# Patient Record
Sex: MALE | Race: BLACK OR AFRICAN AMERICAN | Employment: UNEMPLOYED | URBAN - METROPOLITAN AREA
[De-identification: names, ages, dates, MRNs, and addresses within clinical notes are randomized per-mention and may not be internally consistent; named-entity substitution may affect disease eponyms.]

---

## 2017-01-11 ENCOUNTER — OFFICE VISIT (OUTPATIENT)
Dept: FAMILY MEDICINE CLINIC | Facility: CLINIC | Age: 47
End: 2017-01-11

## 2017-01-11 VITALS
DIASTOLIC BLOOD PRESSURE: 90 MMHG | SYSTOLIC BLOOD PRESSURE: 140 MMHG | WEIGHT: 161 LBS | RESPIRATION RATE: 14 BRPM | HEART RATE: 88 BPM | BODY MASS INDEX: 25.88 KG/M2 | HEIGHT: 66 IN

## 2017-01-11 DIAGNOSIS — F10.239 ALCOHOL DEPENDENCE WITH WITHDRAWAL WITH COMPLICATION (HCC): ICD-10-CM

## 2017-01-11 DIAGNOSIS — I10 UNCONTROLLED HYPERTENSION: ICD-10-CM

## 2017-01-11 DIAGNOSIS — G51.4 FACIAL TWITCHING: ICD-10-CM

## 2017-01-11 DIAGNOSIS — Z91.19 MEDICALLY NONCOMPLIANT: ICD-10-CM
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
## 2017-01-11 DIAGNOSIS — F32.A DEPRESSIVE DISORDER: ICD-10-CM

## 2017-01-11 DIAGNOSIS — R56.9 SEIZURE (HCC): ICD-10-CM

## 2017-01-11 DIAGNOSIS — G44.311 INTRACTABLE ACUTE POST-TRAUMATIC HEADACHE: ICD-10-CM

## 2017-01-11 DIAGNOSIS — K70.30 ALCOHOLIC CIRRHOSIS OF LIVER WITHOUT ASCITES (HCC): ICD-10-CM

## 2017-01-11 DIAGNOSIS — F10.10 ALCOHOL ABUSE: ICD-10-CM

## 2017-01-11 DIAGNOSIS — Z87.19 HISTORY OF PANCREATITIS: ICD-10-CM

## 2017-01-11 DIAGNOSIS — F41.9 ANXIETY: ICD-10-CM

## 2017-01-11 DIAGNOSIS — I10 ESSENTIAL HYPERTENSION: Primary | ICD-10-CM

## 2017-01-11 DIAGNOSIS — E83.42 HYPOMAGNESEMIA: ICD-10-CM

## 2017-01-11 DIAGNOSIS — D64.9 ANEMIA, UNSPECIFIED TYPE: ICD-10-CM

## 2017-01-11 PROCEDURE — 99215 OFFICE O/P EST HI 40 MIN: CPT | Performed by: FAMILY MEDICINE

## 2017-01-11 RX ORDER — IBUPROFEN 800 MG/1
800 TABLET ORAL 3 TIMES DAILY
Qty: 90 TABLET | Refills: 3 | Status: SHIPPED | OUTPATIENT
Start: 2017-01-11 | End: 2017-11-01

## 2017-01-11 RX ORDER — TRAMADOL HYDROCHLORIDE 50 MG/1
50 TABLET ORAL EVERY 8 HOURS PRN
Qty: 60 TABLET | Refills: 0 | Status: ON HOLD | OUTPATIENT
Start: 2017-01-11 | End: 2017-03-02

## 2017-01-11 RX ORDER — CYCLOBENZAPRINE HCL 10 MG
10 TABLET ORAL NIGHTLY PRN
Qty: 30 TABLET | Refills: 0 | Status: SHIPPED | OUTPATIENT
Start: 2017-01-11 | End: 2017-02-19
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
## 2017-01-16 ENCOUNTER — TELEPHONE (OUTPATIENT)
Dept: FAMILY MEDICINE CLINIC | Facility: CLINIC | Age: 47
End: 2017-01-16

## 2017-01-16 NOTE — TELEPHONE ENCOUNTER
A Medical Record Release of Information Request rec'd from [place] [employer] - [facility] [employer] Disability Determination was rec'd via postal mail on 1/10/2017. Claim # [id number]; Call Adjudicator: Randa Marquis @ [phone] ext [phone].   Request is for pt'

## 2017-01-31 ENCOUNTER — TELEPHONE (OUTPATIENT)
Dept: FAMILY MEDICINE CLINIC | Facility: CLINIC | Age: 47
End: 2017-01-31

## 2017-02-09 NOTE — TELEPHONE ENCOUNTER
Last refill-11/23/2016, quantity-30 refill-1. Scheduled appointment 2/21/2017. Please approve if appropriate.

## 2017-02-10 RX ORDER — DIAZEPAM 5 MG/1
TABLET ORAL
Qty: 30 TABLET | Refills: 0 | Status: SHIPPED | OUTPATIENT
Start: 2017-02-10 | End: 2017-02-10

## 2017-02-10 RX ORDER — DIAZEPAM 5 MG/1
5 TABLET ORAL DAILY
Qty: 30 TABLET | Refills: 0 | Status: ON HOLD | OUTPATIENT
Start: 2017-02-10 | End: 2017-03-02

## 2017-02-23 RX ORDER — CYCLOBENZAPRINE HCL 10 MG
TABLET ORAL
Qty: 30 TABLET | Refills: 0 | Status: ON HOLD | OUTPATIENT
Start: 2017-02-23 | End: 2017-03-02

## 2017-02-23 RX ORDER — ALPRAZOLAM 0.5 MG/1
TABLET ORAL
Qty: 60 TABLET | Refills: 0 | OUTPATIENT
Start: 2017-02-23

## 2017-02-24 NOTE — ED NOTES
Pt for admission for acute pancreatitis, admitted to have alcoholic drinks 2 days ago, started to have pain yesterday.  Known hx of pancreatits alcohol induced

## 2017-02-24 NOTE — ED PROVIDER NOTES
Patient Seen in: [facility] Emergency Department    History   Patient presents with:  Abdomen/Flank Pain (GI/)    Stated Complaint: c/o abd pain since yesterday, more to the upper quads    HPI    Patient is a 68-year-old presented to the ER with h TABLET(750 MG) BY MOUTH TWICE DAILY   ibuprofen 800 MG Oral Tab,  Take 1 tablet (800 mg total) by mouth 3 (three) times daily.    HYDROcodone-acetaminophen (NORCO)  MG Oral Tab,  Take 1 tablet by mouth every 8 (eight) hours as needed for Pain (Left sh tenderness. Extremities: Warm, well perfused, without clubbing, cyanosis, or edema  Calves are symmetric and nontender    Skin: Unremarkable without lesions or rash.      Neurologic: Awake alert and oriented x 3 with clear speech    No tremor, no liver EKG to compare to. Patient seen upon arrival kept n.p.o. IV was established and he was given IV Zofran, Dilaudid, Pepcid, and laboratories are drawn. On repeat exam patient's pain is somewhat improved. Urinalysis unremarkable.   Chemistries consi

## 2017-02-24 NOTE — PLAN OF CARE
NURSING ADMISSION NOTE      Patient admitted via cart  Oriented to room. Safety precautions initiated. Bed in low position. Call light in reach. Patient alert and oriented, /102, hydralazine 10mg given per order, bp reassessment is 143/93.

## 2017-02-24 NOTE — BH LEVEL OF CARE ASSESSMENT
Comprehensive Assessment Note    General Questions  Why are you here?: etoh  Precipitating Events: pt reports he got a little carried away because earlier this week it was his birthday. Pt reports getting tx at [facility] in the past and will follow up there.  Pt

## 2017-02-24 NOTE — PHYSICAL THERAPY NOTE
PHYSICAL THERAPY EVALUATION - INPATIENT     Room Number: 0659/3899-T  Evaluation Date: 2/24/2017  Type of Evaluation: Initial  Physician Order: PT Eval and Treat    Presenting Problem: Alcohol induced pancreatitis  Reason for Therapy: Mobility Dysfunct go. He does not use walker or any DME. He notes he \"does not drive right now\" but states during a recall of his daily activities that he does drive his wife to the train station.      SUBJECTIVE  My foot has been like that my whole life [re RAVEN GARCIA]    [place] Climbing 3-5 steps with a railing?: A Little       AM-PAC Score:  Raw Score: 22   PT Approx Degree of Impairment Score: 20.91%   Standardized Score (AM-PAC Scale): 53.28   CMS Modifier (G-Code): CJ    FUNCTIONAL ABILITY STATUS  Gait Assessment   Gait Kendell above deficits to assist patient in returning to prior level of function. SLS for his age demonstrates high risk for falls and pt would benefit from OP PT upon discharge as well as recommending use of DME, and possible RLE brace to reduce risk for falls.

## 2017-02-25 NOTE — PLAN OF CARE
Patient calm and cooperative. CIWA scores 3 and below all day. Patient c/o abdominal pain, that he states is severe at times, but he has been requesting to eat. Dilaudid controlling pain, and he has been sleeping on and off all day.   IVF infusing

## 2017-02-25 NOTE — PLAN OF CARE
Assumed patient care at 0. Patient A&O x 4 with frequent complaints of pain and discomfort in his abdomen. PRN Dilaudid given with relief noted. VSS. Tele NSR. CIWA protocol and seizure precautions in place. CIWA scores 3 or less overnight.  Patient cont

## 2017-02-25 NOTE — PAYOR COMM NOTE
Attending Physician: Myesha Kaye MD    2/24    ED    Abdomen/Flank Pain     Stated Complaint: c/o abd pain since yesterday, more to the upper quads      Patient is a 61-year-old presented to the ER with history of pancreatitis last year  started having o limits    MAG 1.8  LIPASE 4810   CBC WITH DIFFERENTIAL WITH PLATELET                   EKG    Rate, intervals and axes as noted on EKG Report. Rate: 94  Rhythm: Sinus Rhythm  Reading: Rate, intervals and axes as noted on EKG Report.   No acute ST Elevation

## 2017-02-25 NOTE — PLAN OF CARE
Last TTE only showed only grade 1 diastolic dysfunction. Will continue aggressive IVFs for the acute pancreatitis but will decrease the Lactate Ringers back to 150cc/hr. Dr. Rosa Bradford    3/2016 TTE  Conclusions:    1. Left ventricle:  The cavity size was at t

## 2017-02-25 NOTE — PROGRESS NOTES
[facility]  Internal Medicine Progress Note    Kal Gil Patient Status:  Inpatient    1970 MRN [id number]   [facility] 3NE-A Attending Afshin Brock MD   [address] Day # 1 PCP Yong Power DO     Date of Admission: 17 Pulses: Distal pulses are intact. Psych: Anxious mood and affect. Linear thought process. Memory intact recent and remote. Language normal repeat and naming. POOR judgement and insight.         Lab Results  Component Value Date   WBC 12.5 02/25/2017 seizures.      Dr. Juan Kelly  (Internal Medicine & Psychiatry))

## 2017-02-26 NOTE — PROGRESS NOTES
[facility]  Internal Medicine Progress Note    Chanda Arce Patient Status:  Inpatient    1970 MRN [id number]   [facility] 3NE-A Attending Jesus Lemus MD   [address] Day # 2 PCP Monica Santiago DO     Date of Admission: 17 auscultation. No wheezes or rales. Heart: Tachycardia. Regular rhythm. S1 normal and S2 normal.    Extremities: Normal range of motion. Neurological: Patient is alert and oriented to person, place and time. Skin:  Warm and dry. Abdomen:  [place] Dispo- He agrees to stay in the hospital today. If he leaves today, it will be AMA.      Dr. Delon Adams  (Internal Medicine & Psychiatry))

## 2017-02-26 NOTE — PLAN OF CARE
ANXIETY    • Will report anxiety at manageable levels Progressing        CARDIOVASCULAR - ADULT    • Maintains optimal cardiac output and hemodynamic stability Progressing    • Absence of cardiac arrhythmias or at baseline Progressing        DRUG ABUSE/DET in [address] [place] [address]. Keppra IV per MAR. Seizure precautions maintained no seizure activity.

## 2017-02-26 NOTE — PLAN OF CARE
Patient reported that he did not sleep well last night, and still has a mild headache. Wife at beside this am and states he would fall asleep for about 15 minutes and wake up on and off all night.   This am he was calm, but now patient is becoming more anxi

## 2017-02-26 NOTE — PLAN OF CARE
Patient now c/o of severe abdominal pain, and chronic back, and mild headache pain. He was told to not eat, morphine given.

## 2017-02-26 NOTE — PLAN OF CARE
Patient tolerating clear liquid diet, no n/v. He just c/o heart burn after, and dr. Miri Sepulveda aware.   PRN tums ordered

## 2017-02-26 NOTE — PLAN OF CARE
Patient calm and cooperative. CIWA scores 4 and below all day.  Patient states abdominal pain has resolved today, but is taking pain meds now for headache, that is now very mild. Patient tolerated italian ice, and ice chips and has had no nausea.  He has b

## 2017-02-27 ENCOUNTER — APPOINTMENT (OUTPATIENT)
Dept: CT IMAGING | Facility: HOSPITAL | Age: 47
DRG: 439 | End: 2017-02-27
Attending: Other
Payer: COMMERCIAL

## 2017-02-27 PROCEDURE — 74177 CT ABD & PELVIS W/CONTRAST: CPT

## 2017-02-27 NOTE — PLAN OF CARE
GASTROINTESTINAL - ADULT    • Maintains adequate nutritional intake (undernourished) Not Progressing        PAIN - ADULT    • Verbalizes/displays adequate comfort level or patient's stated pain goal Not Progressing          ANXIETY    • Will report anxiety

## 2017-02-27 NOTE — PLAN OF CARE
ANXIETY    • Will report anxiety at manageable levels Progressing        CARDIOVASCULAR - ADULT    • Maintains optimal cardiac output and hemodynamic stability Progressing    • Absence of cardiac arrhythmias or at baseline Progressing        DRUG ABUSE/DET

## 2017-02-27 NOTE — PLAN OF CARE
INTERNAL MEDICINE PROGRESS NOTE ADDENDUM    CT performed. Consistent with acute pancreatitis; no pseudocyst. Suggested \"focal adynamic ileus\" in duodenum and proximal jejunum.  Also has bilateral pleural effusions (mild/mod in size on my reading, likely f patient's pancreatitis. There is no evidence for small bowel obstruction.    Upper normal gastrohepatic ligament lymph node measures 11 x 9 mm. Few other smaller lymph nodes are also seen in the gastrohepatic ligament. No free air.   ABDOMINAL WALL:  There

## 2017-02-27 NOTE — CM/SW NOTE
SW found pt thru casefinding for readmission risk met with patient for assessment and discharge planning. Pt is a 49 y.o male admitted on 02/24  with dx of ETOH induced pancreatitis. Pt's most recent discharge was 11/15/16.  Pt's medical history includes E

## 2017-02-27 NOTE — PROGRESS NOTES
Pt initially saying he wanted to go and had minimal pain through the night and early morning. Now pt is complaining of abdominal pain and saying he wants to stay and will try to reschedule his interview.      Will re-establish IV access, tried 2x in R h

## 2017-02-27 NOTE — PROGRESS NOTES
[facility]  Internal Medicine Progress Note    Yvonne Barbara Patient Status:  Inpatient    1970 MRN [id number]   [facility] 3NE-A Attending Oscar Rojas MD   [facility] Day # 3 PCP Ginger Mcpherson DO     Date of Admission: 17 104   Temp:    Resp:        Objective:  General Appearance:  Uncomfortable. Vital signs: (most recent): Blood pressure 162/112, pulse 96, temperature 99.2 °F (37.3 °C), temperature source Oral, resp. rate 20, height 66\", weight 170 lb, SpO2 97 %.     Laura Kim drinking, just cut down    3) Seizure disorder vs. Hx of recurrent alcohol withdrawal seizures  - Continue Keppra 750mg po twice a day    4) HTN  - Continue Enalapril 20mg daily  - Continue Hydralazine IV/po prn     5) Elevated AST/ALT, likely alcohol rela

## 2017-02-28 RX ORDER — ALPRAZOLAM 0.5 MG/1
TABLET ORAL
Qty: 60 TABLET | Refills: 0 | OUTPATIENT
Start: 2017-02-28

## 2017-02-28 NOTE — PLAN OF CARE
ANXIETY    • Will report anxiety at manageable levels Progressing        CARDIOVASCULAR - ADULT    • Maintains optimal cardiac output and hemodynamic stability Progressing        DRUG ABUSE/DETOX    • Will have no detox symptoms and will verbalize plan for

## 2017-02-28 NOTE — PROGRESS NOTES
Patient politely declined restorative ambulation citing pain and fatigue. \"I just don't feel good. \"  Encouraged patient to ambulate TID w/ RN staff to avoid deconditioning.   RN Svetlana made aware of patient refusal.

## 2017-02-28 NOTE — PROGRESS NOTES
Gastroenterology Progress Note  Rocco Shahla Patient Status:  Inpatient    1970 MRN [id number]   [facility] 3NE-A Attending Gilmar Stafford MD   [facility] Day # 4 PCP Ellen Cool DO hours as needed for nausea  5. Continue to monitor electrolytes and replace as needed   6. Activity encouraged; IS hourly while awake  7.  EtOH abstinence advised   Renaldo Cranker, APN  12:10 PM  2/28/2017  [facility] Gastroenterology  [phone]    [id number]

## 2017-02-28 NOTE — CONSULTS
Gastroenterology Initial Consultation  I have personally seen and examined the patient.     Patient Name: April Rust  Referring physician: Dr Maine Mcpherson  Reason for consultation: pancreatitis  CC: abdominal pain  HPI: Mr Bro Hernandez is a 42 yo with EtOH'ism, (TUMS) chewable tab 500 mg 500 mg Oral TID PRN   [DISCONTINUED] ChlordiazePOXIDE HCl (LIBRIUM) cap 10 mg 10 mg Oral TID   [DISCONTINUED] HYDROmorphone HCl PF (DILAUDID) 1 MG/ML injection 1 mg 1 mg Intravenous Q3H PRN   Thiamine HCl tab 100 mg 100 mg Oral D [DISCONTINUED] HYDROmorphone HCl PF (DILAUDID) 1 MG/ML injection 1 mg 1 mg Intravenous Q2H PRN   [DISCONTINUED] lactated ringers infusion  Intravenous Continuous     Allergies:   Dilantin                Itching    Comment:irriatibility  Norco [Hydrocodon 170 lb (77.111 kg)  BMI 27.45 kg/m2  SpO2 100%  Gen: AAO x 3, able to speak in complete sentences  HENT: NCAT, oropharynx is clear with moist mucosal membranes  Eyes: Sclerae are anicteric  Neck:  Supple without nuchal rigidity;  No lymphadenopathy  CV: Reg pleural effusions and bibasilar airspace disease. 5. Probable focal adynamic ileus with mild fluid distention of the duodenum and proximal jejunum.  No evidence for mechanical obstruction.        Impression: Mr Sumit Davis is a gentleman with EtOH'ism and EtOH

## 2017-02-28 NOTE — BH PROGRESS NOTE
Went to see the patient and talk to him about cd treatment. He doesn't seem to be too interested. He said, he wants to stop drinking but is not sure about going to a program.  Discussed this with him in length and gave him the cd referrals.   His nurse is

## 2017-02-28 NOTE — PROGRESS NOTES
[facility]  Internal Medicine Progress Note    Lisa Bruno Patient Status:  Inpatient    1970 MRN [id number]   [facility] 3NE-A Attending Daina Gordillo MD   [address] Day # 4 PCP Mary Kate Feliciano DO     Date of Admission: 17 suicidal ideation. He doesn't want to quit drinking. He just wants to cut down.      Past Medical History   Diagnosis Date   • Seizure disorder [facility])    • Unspecified essential hypertension    • ETOH abuse    • Scoliosis    • High blood pressure      • Sert Assessment & Plan  1) Alcohol induced acute pancreatitis  - CT Abd/Pelvis showed findings consistent with acute pancreatitis, no pseudocyst  - Start clears today  - Continue 0.9NS, but at 100cc/hr. - Continue morphine 2-4mg IV every 3hr prn pain.

## 2017-03-01 NOTE — PROGRESS NOTES
Gastroenterology Progress Note  Patient Name: Ivelisse Hooper  Chief Complaint: EtOH pancreatitis  S: Eating ok, had no fevers overnight, no nausea or vomiting.     O: /114 mmHg  Pulse 99  Temp(Src) 98.2 °F (36.8 °C) (Oral)  Resp 17  Ht 167.6 cm (5 did have documented fever 24 hours ago, but in absence of fluid collection or abscess, this likely related to ongoing inflammatory response.  He has ileus on CT, but symptomatically appears to have resolved.     Recommendations:    - Advance to soft diet

## 2017-03-01 NOTE — PROGRESS NOTES
[facility]  Internal Medicine Progress Note    Jake Ford Patient Status:  Inpatient    1970 MRN [id number]   [facility] 3NE-A Attending Allan Hernandez MD   [facility] Day # 5 PCP Rupa Paulino DO     Date of Admission: 17 evening. He has a hx of withdrawal seizures, so is on Keppra. He feels anxious and depressed, but not hopeless. No suicidal ideation. He doesn't want to quit drinking. He just wants to cut down.      Past Medical History   Diagnosis Date   • Seizure disorde 03/01/2017    03/01/2017   CO2 24.0 03/01/2017    03/01/2017   CA 8.1 03/01/2017   MG 1.6 03/01/2017       Assessment & Plan  1) Alcohol induced acute pancreatitis  - CT Abd/Pelvis showed findings consistent with acute pancreatitis, no pseudoc

## 2017-03-01 NOTE — PLAN OF CARE
Internal Medicine Addendum:    1) DC Morphine prn. Start Norco 10/325 1-2 tabs every 4hrs as needed for pain. 2) DC IVF's.     3) Continue soft diet tonight.  If he tolerates, can start regular diet tomorrow AM.     4) Plan for discharge tomorrow at 9 A

## 2017-03-02 NOTE — PLAN OF CARE
ANXIETY    • Will report anxiety at manageable levels Adequate for Discharge        CARDIOVASCULAR - ADULT    • Maintains optimal cardiac output and hemodynamic stability Adequate for Discharge    • Absence of cardiac arrhythmias or at baseline Adequate fo

## 2017-03-02 NOTE — CM/SW NOTE
03/02/17 1400   Discharge disposition   Discharged to: Home or [address] [place] [address] services after discharge Patient refused services   Discharge transportation Private car

## 2017-03-02 NOTE — DISCHARGE SUMMARY
[facility]  Internal Medicine Discharge Summary    Marilin Stoddard Patient Status:  Inpatient    1970 MRN [id number]   [facility] 3NE-A Attending Lakhwinder Doherty MD   [facility] Day # 6 PCP Kendy Carney DO     Date of Admission:  severe alcohol use disorder was admitted on 2/24/17 for evaluation of his nausea and abdominal pain. Hospital Course:   He was found to have acute alcohol related pancreatitis.  He was admitted to the behavioral med floor and started on aggressive IVFs person, place and time.    Skin:  Warm and dry.    Abdomen: Abdomen is soft.  Bowel sounds are normal. There is very mild epigastric tenderness. There is no rebound tenderness.     Pulses: Distal pulses are intact.    Psych: \"Fine\" mood, calm affect.  Mike Dorman 3/2/2017  6:28 AM   Commonly known as:  MOTRIN        Take 1 tablet (800 mg total) by mouth 3 (three) times daily.     Quantity:  90 tablet   Refills:  3       levetiracetam 750 MG Tabs   Last time this was given:  750 mg on 3/2/2017  8:24 AM   Commonly kit

## 2017-03-02 NOTE — PROGRESS NOTES
Paging Dr. Deangelo Gonzalez in regards to PT. B/P.  10mg Hydralazine just given. Clonidine order given. Per Hailey Kong in pharmacy, allergy: contraindication-states it will be okay to give.

## 2017-03-07 NOTE — PAYOR COMM NOTE
Review Type: CONTINUED STAY  Reviewer: Naz Freeman     Date: March 7, 2017 - 12:29 PM  Payor: XAVIER LESLIE  Authorization Number: [id number]  Admit date: 2/24/2017 12:12 AM     Date of Discharge: 3/2/17    acute pancreatitis, elevated transaminases, ileus

## 2017-03-08 NOTE — PAYOR COMM NOTE
Discharge Summaries by Saira Richardson MD at 3/2/2017  8:04 AM      [facility] [address]  Internal Medicine Discharge Summary      Renetta Almanza  Patient Status:  Inpatient    [facility] 2/21/1970  MRN  [id number]    [address] [place] [address] 2/26 he had a fever of 101.4 but no source of infection. He continued to have significant abdominal tenderness, so an Abd/Pelvic CT with IV contrast was obtained. It revealed \"Findings are consistent with acute pancreatitis.  No pancreatic pseudocyst ident AM    Commonly known as:  HYDRODIURIL           Take 1 tablet (25 mg total) by mouth daily.      Quantity:  30 tablet    Refills:  0                           CHANGE how you take these medications          Instructions  Prescription details      HYDROcodon 25 MG Tabs  - HYDROcodone-acetaminophen  MG Tabs          Follow up Visits: With his primary MD.     Follow up Labs: None    Dr. Kristan Gale  (Internal Medicine)

## 2017-03-31 ENCOUNTER — TELEPHONE (OUTPATIENT)
Dept: FAMILY MEDICINE CLINIC | Facility: CLINIC | Age: 47
End: 2017-03-31

## 2017-03-31 NOTE — TELEPHONE ENCOUNTER
Pt Wife Marylee Knudsen called. She is asking for paper prescription of LEVETIRACETAM 750 MG Oral Tab for patient. She explained that they are in between insurance and she found a place that will pay for the medication but will do it only for a paper script.   P

## 2017-03-31 NOTE — TELEPHONE ENCOUNTER
Pt needs printed order because he is between insurances and would like a paper copy of Rx so that he can bring it to a place that will help him pay for it

## 2017-03-31 NOTE — TELEPHONE ENCOUNTER
Call to requested call back # reaches recording stating # is not in service. Call to pt's cell chacho pt-he sts only spouse knows info regarding program she found that will cost much less for this prescription. Requests i contact her.    Call to [facility]

## 2017-04-03 RX ORDER — LEVETIRACETAM 750 MG/1
TABLET ORAL
Qty: 60 TABLET | Refills: 2 | Status: SHIPPED | OUTPATIENT
Start: 2017-04-03 | End: 2017-05-20

## 2017-04-03 NOTE — TELEPHONE ENCOUNTER
Not a protocol medication last OV 1/11/17 last refill 1/27/17, please review and refill if appropriate

## 2017-04-30 ENCOUNTER — PATIENT OUTREACH (OUTPATIENT)
Dept: FAMILY MEDICINE CLINIC | Facility: CLINIC | Age: 47
End: 2017-04-30

## 2017-04-30 NOTE — PROGRESS NOTES
Please call patient to schedule an office visit with Dr. Jazmyn Garcia for hypertention / blood pressure.     Last office visit:    1/11/17

## 2017-05-24 RX ORDER — LEVETIRACETAM 750 MG/1
TABLET ORAL
Qty: 60 TABLET | Refills: 0 | Status: SHIPPED | OUTPATIENT
Start: 2017-05-24 | End: 2017-07-30

## 2017-08-06 RX ORDER — LEVETIRACETAM 750 MG/1
TABLET ORAL
Qty: 60 TABLET | Refills: 0 | Status: SHIPPED | OUTPATIENT
Start: 2017-08-06 | End: 2017-12-27

## 2017-11-01 ENCOUNTER — APPOINTMENT (OUTPATIENT)
Dept: CT IMAGING | Facility: HOSPITAL | Age: 47
DRG: 439 | End: 2017-11-01
Attending: EMERGENCY MEDICINE
Payer: MEDICAID

## 2017-11-01 PROCEDURE — 74177 CT ABD & PELVIS W/CONTRAST: CPT | Performed by: EMERGENCY MEDICINE

## 2017-11-01 NOTE — BH PROGRESS NOTE
Went to see the pt for the gerald level of care assessment. Pt stated he currently is not wanting any cd tx or referrals.   He said, when he is out of the hospital he is planning on looking into treatment at [facility] where he was in the past.  He did talk about ha

## 2017-11-01 NOTE — CONSULTS
[facility]                       Gastroenterology [address] [facility] Gastroenterology    [facility] Patient Status:  Inpatient    1970 MRN [id number]   [facility] 3NE-A Attending Mila Nunez MD   [address] Day # Comment: lengthened right leg  2002: VASECTOMY     Medications:   HYDROmorphone HCl PF (DILAUDID) 1 MG/ML injection 0.5 mg 0.5 mg Intravenous Q30 Min PRN   [COMPLETED] ondansetron HCl (ZOFRAN) injection 4 mg 4 mg Intravenous Once   [COMPLETED] sodium chl other gastrointestinal malignancies; No family history of ulcer disease, or inflammatory bowel disease    ROS:  In addition to the pertinent positives described above:             Infectious Disease:  No chronic infections or recent fevers prior to the acu cyanosis    Skin: Warm and dry    Psychiatric: Anxious     Labs:   Lab Results  Component Value Date   WBC 6.5 11/01/2017   HGB 13.2 11/01/2017   HCT 39.0 11/01/2017   .0 11/01/2017   CREATSERUM 0.95 11/01/2017   BUN 10 11/01/2017    11/01/201 pancreas. This is favored represent pancreatitis. A tiny hypodense lesion within the pancreatic head is present. This measures 1.0 cm in diameter. No evidence of   pancreatic ductal dilatation. SPLEEN:  Small amount of perisplenic fluid is present.   Gary Enrique favored to represent a small pseudocyst.     The findings were discussed with Dr. Kiarra Tierney by Dr. Chitra Martínez at approximately 7:55 on 11/1/17. Read back was performed.      Dictated by: Kristin Flores MD on 11/01/2017 at 7:43       Approved by: Kristin Flores MD  ___________ No mass or hernia. URINARY BLADDER:  Normal.  No visible focal wall thickening, lesion, or calculus. PELVIC NODES:  Normal.  No adenopathy. PELVIC ORGANS:  Normal.  No visible mass. Pelvic organs appropriate for patient age.     BONES:  Normal.  N patient was last seen in February, related to this. He reported resolution of his pain, over the interval period.   However, 2 weeks ago, he was drinking heavily, and reported the onset of mid-abdominal pain transmitting to the left upper abdomen, the next

## 2017-11-01 NOTE — ED PROVIDER NOTES
Patient Seen in: [facility] Emergency Department    History   Patient presents with:  Abdomen/Flank Pain (GI/)  Nausea/Vomiting/Diarrhea (gastrointestinal)    Stated Complaint: Nausea vomiting and abdominal pain for the last 2 weeks    HPI    47-y Physical Exam    Vital signs reviewed  General appearance: Patient is alert and in no acute distress  HEENT: Pupils equal react to light extraocular muscles intact no scleral icterus, mucous membranes are moist, there is no erythema or exudate in t Please view results for these tests on the individual orders. RAINBOW DRAW BLUE   RAINBOW DRAW LAVENDER   RAINBOW DRAW LIGHT GREEN   RAINBOW DRAW GOLD     EKG    Rate, intervals and axes as noted on EKG Report.   Rate: 101  Rhythm: Sinus Rhythm  Readi Course  ------------------------------------------------------------  MDM     As per above      Disposition and Plan     Clinical Impression:  Severe alcohol use disorder ([facility] [address]  (primary encounter diagnosis)  Transaminitis  Alcohol-induced acute pancreatit

## 2017-11-01 NOTE — CONSULTS
[facility]  Report of Consultation    Deysi Sandra Patient Status:  Emergency    1970 MRN [id number]   Location [address] [facility] Attending Prema Nicole MD   Hosp Day # 0 PCP Renée Grayson DO     Reason for Consultation 0.9 % 250 mL IVPB, 40 mEq, Intravenous, Once    Home Medications:      No current facility-administered medications on file prior to encounter.    Current Outpatient Prescriptions on File Prior to Encounter:  LEVETIRACETAM 750 MG Oral Tab TAKE 1 TABLET BY M weeks  TECHNIQUE:  CT scanning was performed from the dome of the diaphragm to the pubic symphysis with non-ionic intravenous contrast material. Post contrast coronal MPR imaging was performed. Dose reduction techniques were used.  Dose information is de lesion or fracture. LUNG BASES:  No visible pulmonary or pleural disease. OTHER:  Negative. CONCLUSION:  There is local inflammatory change surrounding the tip of the appendix with some mild local inflammatory changes present.  This is a new finding Severe alcohol use disorder (HCC)     Anemia     Ileus (HCC)      Impression:    42 y/o with abdominal pain, pancreatitis, appendicitis?    CT scan as noted above: new fluid collection at tip of appendix air within lumen of appendix, though based on exam fi

## 2017-11-01 NOTE — ED INITIAL ASSESSMENT (HPI)
Patient is a 40 yo male with c/o LUQ abd pain intermittently over the last 2 weeks. Patient states that he has HX of pancreatitis and ETOH abuse. Patient states that he is a daily drinker. Patient rates pain as 10 of 10 and described as sharp in nature.  Pa

## 2017-11-01 NOTE — H&P
[facility]  History and Physical     Morris Lesser Patient Status:  Inpatient    1970 MRN [id number]   [facility] 3NE-A Attending Paul Chao MD   Hosp Day # 0 PCP Cindy Gallego DO     Chief Complaint: abdominal pain Outpatient Prescriptions on File Prior to Encounter:  LEVETIRACETAM 750 MG Oral Tab TAKE 1 TABLET BY MOUTH 2 TIMES DAILY Disp: 60 tablet Rfl: 0   hydrochlorothiazide 25 MG Oral Tab Take 1 tablet (25 mg total) by mouth daily.  Disp: 30 tablet Rfl: 0   Enalap results for input(s): TROP, CK in the last 72 hours. Imaging: Imaging data reviewed in Epic. ASSESSMENT / PLAN:     1. Acute pancreatitis   1. NPO  2. IVF  3. GI consult  4. Supportive care  2. ETOH abuse and pending withdrawals  1.  [facility] protocol

## 2017-11-01 NOTE — ED NOTES
Pt states last noc was his last drink, states drinks 5-7 airplane bottles of southern comfort a night

## 2017-11-01 NOTE — CONSULTS
[facility]  Report of Psychiatric Consultation    Marilin Stoddard Patient Status:  Inpatient    1970 MRN [id number]   [facility] 3NE-A Attending Chava Brown MD   Hosp Day # 0 PCP Kendy Carney DO     Date of Admission:  heroin to treat his chronic pain. This \"broke my heart\" and he escaped by drinking more. He feels anxious, depressed, guilty, and has low energy/appetite/motivation and some difficulty sleeping, but NO suicidal ideation.      Psychiatry Review Systems: No 67 years inj 0.5ml, 0.5 mL, Intramuscular, Prior to discharge  •  levETIRAcetam (KEPPRA) 750 mg in sodium chloride 0.9 % 100 mL IVPB, 750 mg, Intravenous, Q12H  •  Piperacillin Sod-Tazobactam So (ZOSYN) 3.375 g in dextrose 5 % 100 mL ADD-vantage, 3.375 g, U.S.    Insight: poor  Judgment: poor    Laboratory Data:    Lab Results  Component Value Date   WBC 6.5 11/01/2017   HGB 13.2 11/01/2017   HCT 39.0 11/01/2017   .0 11/01/2017   CREATSERUM 0.95 11/01/2017   BUN 10 11/01/2017    11/01/2017   K

## 2017-11-01 NOTE — PLAN OF CARE
GASTROINTESTINAL - ADULT    • Minimal or absence of nausea and vomiting Progressing    • Maintains or returns to baseline bowel function Progressing        NEUROLOGICAL - ADULT    • Absence of seizures Progressing            GI consult  Surgery consult  IV

## 2017-11-02 NOTE — PLAN OF CARE
Pt alert and oriented. C/o abdominal pain, itching. PRN morphine given, relief noted. IV benadryl x1 given, relief noted. Pt adamant about getting IV morphine q2 hours, rating pain 4/10. No c/o n/v. CIWA q2 hours.   Pt refusing tx programs for his dri

## 2017-11-02 NOTE — PROGRESS NOTES
[facility]  Progress Note    Clari Drake Patient Status:  Inpatient    1970 MRN [id number]   [facility] 3NE-A Attending Jennifer Esteban MD   Hosp Day # 1 PCP Az Code, DO     Subjective:    Patient reports pain is impro pancreatitis without infection or necrosis     Alcohol-induced acute pancreatitis     Alcohol-induced acute pancreatitis, unspecified complication status     Benign essential HTN     Transaminitis     Alcoholic hepatitis without ascites     Severe alcohol

## 2017-11-02 NOTE — PROGRESS NOTES
[facility]  Report of Psychiatric Progress Note    Date of Admission: 11/1/17  Date of Service: 11/2/17  Reason for Consultation: Alcohol use disorder     Impression: He has alcohol induced acute pancreatitis.  He has severe alcohol use disorder and sa for acute pancreatitis. But it was in the summer of 2017 that he began drinking 1 pint of vodka again. Then the past 2 wks, he had days when he drank 1/5 gallon of vodka daily.  He says the trigger for the binge drinking was his son coming come from the [place] Surgical History:  1978: OTHER SURGICAL HISTORY      Comment: lengthened right leg  2002: VASECTOMY        Family History   Problem Relation Age of Onset   • Heart Attack Father 50   • Thyroid Disorder Mother        Allergies:     Dilantin                I ALB 2.8 11/02/2017   ALKPHO 109 11/02/2017   BILT 1.0 11/02/2017   TP 7.2 11/02/2017   AST 42 11/02/2017   ALT 48 11/02/2017   PTT 29.5 11/01/2017   INR 1.12 11/01/2017    11/02/2017   MG 1.9 11/02/2017       STUDIES:     11/1/17  CT Abd/Pelvis wi

## 2017-11-02 NOTE — PAYOR COMM NOTE
--------------  ADMISSION REVIEW     Payor: MEDICAID  Subscriber #:  [id number]  Authorization Number: N/A    Admit date: 11/1/17  Admit time: 1012       Admitting Physician: Felisha Ferguson MD  Attending Physician:  Ashanti Olvera MD  [facility][[id number]]          Abnormal            Final result                 Please view results for these tests on the individual orders. RAINBOW DRAW BLUE   RAINBOW DRAW LAVENDER   RAINBOW DRAW LIGHT GREEN   RAINBOW DRAW GOLD[SY. 2]     EKG    Rate Action Dose Route User    11/2/2017 0230 Given 5 mg Intravenous Mechele Vin, RN      hydrALAzine HCl (APRESOLINE) injection 10 mg     Date Action Dose Route User    11/2/2017 0515 Given 10 mg Intravenous Mechele Vin, RN      HYDROmorphone HCl PF (DIL injection 2 mg     Date Action Dose Route User    11/2/2017 0650 Given 2 mg Intravenous Michael Orourke RN      INFUVITE (INFUVITE) 10 mL, Thiamine HCl 634 mg, folic acid (FOLVITE) 1 mg in dextrose 5 % 1,000 mL infusion     Date Action Dose Route User    1

## 2017-11-02 NOTE — PLAN OF CARE
Assumed patient care at 1900  Patient A&O x 4  Complaints of pain and discomfort in abdomen.  PRN dilaudid given with relief noted  Tele-NSR/ST  CIWA protocol  Sz precautions in place  NPO with ice chips  Patient resting comfortably in bed  Updated on POC

## 2017-11-02 NOTE — PROGRESS NOTES
[facility]  Progress Note     Connor Montes De Oca Patient Status:  Inpatient    1970 MRN [id number]   [facility] 3NE-A Attending Theresa Ramírez MD   Hosp Day # 1 PCP Gloria Lamb DO     Chief Complaint: abdominal pain    S: Pa piperacillin-tazobactam  3.375 g Intravenous Q8H   • multivitamin/thiamine/folic acid infusion   Intravenous Q24H       ASSESSMENT / PLAN:     1. Acute pancreatitis   1. NPO  2. IVF  3. GI  On consult  4. Supportive care  2.  ETOH abuse and pending withdraw

## 2017-11-02 NOTE — PROGRESS NOTES
Gastroenterology Progress Note  Patient Name: Marilin Stoddard  Chief Complaint: Acute pancreatitis  S: The patient reports continued abdominal pain localized to the epigastrium. He has had some nausea, but no vomiting.   He is requesting narcotic analge chips              IV LR             Supportive care for now

## 2017-11-03 NOTE — PLAN OF CARE
GASTROINTESTINAL - ADULT    • Minimal or absence of nausea and vomiting Progressing    • Maintains or returns to baseline bowel function Progressing        NEUROLOGICAL - ADULT    • Absence of seizures Progressing          Patient alert and oriented x4.   O

## 2017-11-03 NOTE — PROGRESS NOTES
[facility]  Progress Note     Ori Flanagan Patient Status:  Inpatient    1970 MRN [id number]   [facility] 3NE-A Attending Anabelle Mills MD   Hosp Day # 2 PCP Dinorah Nickerson DO     Chief Complaint: abdominal pain    S: pa Epic.    Medications:   • Enalapril Maleate  20 mg Oral Daily   • hydrochlorothiazide  25 mg Oral Daily   • metoprolol Tartrate  10 mg Intravenous Q6H   • levETIRAcetam  750 mg Intravenous Q12H   • piperacillin-tazobactam  3.375 g Intravenous Q8H   • multi

## 2017-11-03 NOTE — PROGRESS NOTES
Gastroenterology Progress Note  Patient Name: Jake Ford  Chief Complaint: Acute pancreatitis  S: The patient reports that his abdominal pain is much better and he is hungry. No narcotic analgesia since 20:30 last night.    O: BP (!) 154/103 (BP Lo

## 2017-11-03 NOTE — PROGRESS NOTES
[facility]  Report of Psychiatric Progress Note    Date of Admission: 11/1/17  Date of Service: 11/3/17  Reason for Consultation: Alcohol use disorder     Impression: He has alcohol induced acute pancreatitis.  He has severe alcohol use disorder and sa of 2017 that he began drinking 1 pint of vodka again. Then the past 2 wks, he had days when he drank 1/5 gallon of vodka daily. He says the trigger for the binge drinking was his son coming come from [place] [employer] and abusing heroin to treat his chronic pain.  Addi Covarrubias • High blood pressure     • Scoliosis     • Seizure disorder [facility])     • Unspecified essential hypertension        Past Surgical History:  1978: OTHER SURGICAL HISTORY      Comment: lengthened right leg  2002: VASECTOMY        Family History   Problem Re 11/03/2017   K 3.6 11/03/2017   CL 95 11/03/2017   CO2 29.0 11/03/2017    11/03/2017   CA 8.6 11/03/2017   ALB 2.5 11/03/2017   ALKPHO 92 11/03/2017   BILT 0.7 11/03/2017   TP 6.6 11/03/2017   AST 24 11/03/2017   ALT 33 11/03/2017       STUDIES:

## 2017-11-03 NOTE — PROGRESS NOTES
3218   Paged Dr. Luis Daniel Louise in regards to B/P  Chanda Louise in regards to B/P-MD made aware of B/P of 175/116 and that the Metoprolol was given at 2300-------do not give more medication currently for B/P. She is ordering Tylenol for his headache.

## 2017-11-03 NOTE — PLAN OF CARE
Not all care plan progression is crossing over on all goals. Pt. Is complaining of abd pain, medication given per MAR. MD notified about B/P-medication dosage changed-monitoring B/P. Tylenol order received for headache. K being replaced via IVPB. Ax4.

## 2017-11-03 NOTE — CONSULTS
[facility] Pharmacy Note: Route Optimization for IV \"Banana Bag\" thiamin, vitamins, folic acid     Patient is currently on IV vitamins in a banana bag  given every 24 hours.    The patient meets the criteria to convert to the oral equivalent as established by th

## 2017-11-04 NOTE — PROGRESS NOTES
Gastroenterology Progress Note  Patient Name: Parvin Holguin  Chief Complaint: Acute pancreatitis  S: The patient reports, \"I feel great\". No nausea/vomiting. No abdominal pain. He is tolerating a low residue diet.    O: BP (!) 156/106 (BP Locat

## 2017-11-04 NOTE — PLAN OF CARE
Not all care plan progressions are crossing over. Pt. B/P elevated-Pt. Refusing all further B/P medications, However, later in evening, pt did allow PRN hydralazine to be given. All other VSS, will continue to monitor. Pt.  Was upset about pain medicatio

## 2017-11-04 NOTE — CM/SW NOTE
Spoke with [place] from [place]. Pt is straight Medicaid. There is no reason to transfer out at this time.

## 2017-11-04 NOTE — PROGRESS NOTES
2031   Paged Dr. Mónica Muñiz about pt. Refusing any further medication for his B/P, pt. States, \"I am not taking any more B/P medication, period. \"  Pt. Is also not happy about his pain medication time frame. 2040  Dr. Mónica Muñiz ordered Toradol.   Explained

## 2017-11-05 NOTE — DISCHARGE SUMMARY
[facility]  DISCHARGE SUMMARY     Bruno Ferguson Patient Status:  Inpatient    1970 MRN [id number]   [facility] 3NE-A Attending No att. providers found   Hosp Day # 3 PCP Belinda Manjarrez DO     Date of Admission: 2017  D chills for the past 3 days but no fever.       Brief Synopsis: Patient is a 53-year-old man admitted with acute abdominal pain. He is found to have acute pancreatitis.   He was also found to have some inflammation near his appendix and a possible abscess o Tabs  Commonly known as:  ZOLOFT  What changed:  See the new instructions. Take 3 tablets (150 mg total) by mouth daily.    Quantity:  45 tablet  Refills:  1        CONTINUE taking these medications      Instructions Prescription details   Enalapril Ma

## 2017-11-15 DIAGNOSIS — I10 UNCONTROLLED HYPERTENSION: ICD-10-CM

## 2017-11-15 RX ORDER — ENALAPRIL MALEATE 20 MG/1
TABLET ORAL
Qty: 30 TABLET | Refills: 0 | Status: SHIPPED | OUTPATIENT
Start: 2017-11-15 | End: 2017-12-27

## 2017-11-17 RX ORDER — TRAMADOL HYDROCHLORIDE 50 MG/1
TABLET ORAL
Qty: 60 TABLET | Refills: 0 | OUTPATIENT
Start: 2017-11-17

## 2017-11-17 NOTE — TELEPHONE ENCOUNTER
Call from [facility]-requesting status of refill request.   Advised record shows refill request received after ofc hours 11/15/17, and of ofc 2-3 business day refill protocol. Informed refill request forwarded to  for review. Abundio Gallagher voices understanding/no further questions. Last ofc visit 1/11/17-advised to follow up in one month-no appts since. Has appt 11/2717  Med last ordered 11/4/17 #20 no RF  **see med refill order pended for review** thanks!

## 2017-11-20 NOTE — TELEPHONE ENCOUNTER
SERTRALINE 50MG TABLETS    Not a per protocol medication. Rx is pending for your approval.    Please sign,    Thank you    Yamini Mary Lou Brandon has an upcoming appt on 11*27*17.

## 2017-11-21 RX ORDER — TRAMADOL HYDROCHLORIDE 50 MG/1
TABLET ORAL
Qty: 60 TABLET | Refills: 0 | OUTPATIENT
Start: 2017-11-21

## 2017-11-21 NOTE — TELEPHONE ENCOUNTER
TRAMADOL 50MG TABLETS---last refilled on 11*04*17 by Dr. Hay Adams for 20 tabs and 0 refill. Not a per protocol medication.     Rx is pending for your approval.    Please print & sign,    Thank you

## 2017-11-27 ENCOUNTER — TELEPHONE (OUTPATIENT)
Dept: FAMILY MEDICINE CLINIC | Facility: CLINIC | Age: 47
End: 2017-11-27

## 2017-11-27 NOTE — TELEPHONE ENCOUNTER
Patient No Show/Call appointment on 11/27/17. This is patient's second No Show appointment for the year: 2/21/17, 11/27/17. Account will be charged $50.  No Show Charge letter sent to patient home address: [address] [place] [address]

## 2017-12-13 ENCOUNTER — HOSPITAL ENCOUNTER (INPATIENT)
Facility: HOSPITAL | Age: 47
LOS: 2 days | Discharge: HOME | DRG: 101 | End: 2017-12-16
Attending: EMERGENCY MEDICINE | Admitting: HOSPITALIST
Payer: MEDICAID

## 2017-12-13 DIAGNOSIS — E11.9 TYPE 2 DIABETES MELLITUS WITHOUT COMPLICATION, WITH LONG-TERM CURRENT USE OF INSULIN (HCC): Primary | ICD-10-CM

## 2017-12-13 DIAGNOSIS — G40.909 SEIZURE DISORDER (HCC): ICD-10-CM

## 2017-12-13 DIAGNOSIS — R73.9 HYPERGLYCEMIA: ICD-10-CM

## 2017-12-13 DIAGNOSIS — E87.6 HYPOKALEMIA: ICD-10-CM

## 2017-12-13 DIAGNOSIS — F10.10 ALCOHOL ABUSE: ICD-10-CM

## 2017-12-13 DIAGNOSIS — Z79.4 TYPE 2 DIABETES MELLITUS WITHOUT COMPLICATION, WITH LONG-TERM CURRENT USE OF INSULIN (HCC): Primary | ICD-10-CM

## 2017-12-13 PROBLEM — E87.1 HYPONATREMIA: Status: ACTIVE | Noted: 2017-12-13

## 2017-12-13 PROCEDURE — 99223 1ST HOSP IP/OBS HIGH 75: CPT | Performed by: HOSPITALIST

## 2017-12-13 RX ORDER — INSULIN ASPART 100 [IU]/ML
0.2 INJECTION, SOLUTION INTRAVENOUS; SUBCUTANEOUS ONCE
Status: COMPLETED | OUTPATIENT
Start: 2017-12-13 | End: 2017-12-13

## 2017-12-14 ENCOUNTER — APPOINTMENT (OUTPATIENT)
Dept: GENERAL RADIOLOGY | Facility: HOSPITAL | Age: 47
DRG: 101 | End: 2017-12-14
Attending: HOSPITALIST
Payer: MEDICAID

## 2017-12-14 PROBLEM — G40.909 SEIZURE DISORDER (HCC): Status: ACTIVE | Noted: 2017-12-14

## 2017-12-14 PROBLEM — E11.9 TYPE 2 DIABETES MELLITUS WITHOUT COMPLICATION, WITH LONG-TERM CURRENT USE OF INSULIN (HCC): Status: ACTIVE | Noted: 2017-12-14

## 2017-12-14 PROBLEM — Z79.4 TYPE 2 DIABETES MELLITUS WITHOUT COMPLICATION, WITH LONG-TERM CURRENT USE OF INSULIN (HCC): Status: ACTIVE | Noted: 2017-12-14

## 2017-12-14 PROBLEM — E87.6 HYPOKALEMIA: Status: ACTIVE | Noted: 2017-12-14

## 2017-12-14 PROCEDURE — 99233 SBSQ HOSP IP/OBS HIGH 50: CPT | Performed by: STUDENT IN AN ORGANIZED HEALTH CARE EDUCATION/TRAINING PROGRAM

## 2017-12-14 PROCEDURE — 99232 SBSQ HOSP IP/OBS MODERATE 35: CPT | Performed by: CLINICAL NURSE SPECIALIST

## 2017-12-14 PROCEDURE — 99255 IP/OBS CONSLTJ NEW/EST HI 80: CPT | Performed by: OTHER

## 2017-12-14 PROCEDURE — 73140 X-RAY EXAM OF FINGER(S): CPT | Performed by: HOSPITALIST

## 2017-12-14 RX ORDER — ONDANSETRON 2 MG/ML
4 INJECTION INTRAMUSCULAR; INTRAVENOUS EVERY 4 HOURS PRN
Status: DISCONTINUED | OUTPATIENT
Start: 2017-12-14 | End: 2017-12-14

## 2017-12-14 RX ORDER — BISACODYL 10 MG
10 SUPPOSITORY, RECTAL RECTAL
Status: DISCONTINUED | OUTPATIENT
Start: 2017-12-14 | End: 2017-12-16

## 2017-12-14 RX ORDER — DEXTROSE AND SODIUM CHLORIDE 5; .45 G/100ML; G/100ML
INJECTION, SOLUTION INTRAVENOUS CONTINUOUS
Status: DISCONTINUED | OUTPATIENT
Start: 2017-12-15 | End: 2017-12-14

## 2017-12-14 RX ORDER — SODIUM CHLORIDE 9 MG/ML
INJECTION, SOLUTION INTRAVENOUS CONTINUOUS
Status: DISCONTINUED | OUTPATIENT
Start: 2017-12-14 | End: 2017-12-14

## 2017-12-14 RX ORDER — LORAZEPAM 2 MG/ML
2 INJECTION INTRAMUSCULAR
Status: DISCONTINUED | OUTPATIENT
Start: 2017-12-14 | End: 2017-12-14

## 2017-12-14 RX ORDER — LORAZEPAM 2 MG/ML
2 INJECTION INTRAMUSCULAR EVERY 30 MIN PRN
Status: DISCONTINUED | OUTPATIENT
Start: 2017-12-14 | End: 2017-12-15

## 2017-12-14 RX ORDER — LORAZEPAM 2 MG/ML
3 INJECTION INTRAMUSCULAR
Status: DISCONTINUED | OUTPATIENT
Start: 2017-12-14 | End: 2017-12-14

## 2017-12-14 RX ORDER — BLOOD-GLUCOSE METER
EACH MISCELLANEOUS
Qty: 1 KIT | Refills: 0 | Status: SHIPPED | OUTPATIENT
Start: 2017-12-14 | End: 2017-12-16

## 2017-12-14 RX ORDER — ENALAPRIL MALEATE 10 MG/1
20 TABLET ORAL DAILY
Status: DISCONTINUED | OUTPATIENT
Start: 2017-12-14 | End: 2017-12-16

## 2017-12-14 RX ORDER — ENOXAPARIN SODIUM 100 MG/ML
40 INJECTION SUBCUTANEOUS DAILY
Status: DISCONTINUED | OUTPATIENT
Start: 2017-12-14 | End: 2017-12-16

## 2017-12-14 RX ORDER — MAGNESIUM OXIDE 400 MG (241.3 MG MAGNESIUM) TABLET
800 TABLET ONCE
Status: COMPLETED | OUTPATIENT
Start: 2017-12-14 | End: 2017-12-14

## 2017-12-14 RX ORDER — LORAZEPAM 2 MG/ML
3 INJECTION INTRAMUSCULAR EVERY 30 MIN PRN
Status: DISCONTINUED | OUTPATIENT
Start: 2017-12-14 | End: 2017-12-15

## 2017-12-14 RX ORDER — DEXTROSE MONOHYDRATE 25 G/50ML
50 INJECTION, SOLUTION INTRAVENOUS
Status: DISCONTINUED | OUTPATIENT
Start: 2017-12-14 | End: 2017-12-16

## 2017-12-14 RX ORDER — LORAZEPAM 2 MG/ML
1 INJECTION INTRAMUSCULAR EVERY 30 MIN PRN
Status: DISCONTINUED | OUTPATIENT
Start: 2017-12-14 | End: 2017-12-14

## 2017-12-14 RX ORDER — IBUPROFEN 400 MG/1
400 TABLET ORAL ONCE
Status: COMPLETED | OUTPATIENT
Start: 2017-12-14 | End: 2017-12-14

## 2017-12-14 RX ORDER — SODIUM CHLORIDE, SODIUM LACTATE, POTASSIUM CHLORIDE, CALCIUM CHLORIDE 600; 310; 30; 20 MG/100ML; MG/100ML; MG/100ML; MG/100ML
INJECTION, SOLUTION INTRAVENOUS CONTINUOUS
Status: DISCONTINUED | OUTPATIENT
Start: 2017-12-14 | End: 2017-12-14

## 2017-12-14 RX ORDER — LORAZEPAM 2 MG/ML
4 INJECTION INTRAMUSCULAR
Status: DISCONTINUED | OUTPATIENT
Start: 2017-12-14 | End: 2017-12-15

## 2017-12-14 RX ORDER — SODIUM CHLORIDE 9 MG/ML
INJECTION, SOLUTION INTRAVENOUS CONTINUOUS
Status: DISCONTINUED | OUTPATIENT
Start: 2017-12-14 | End: 2017-12-15

## 2017-12-14 RX ORDER — POTASSIUM CHLORIDE 20 MEQ/1
40 TABLET, EXTENDED RELEASE ORAL EVERY 4 HOURS
Status: COMPLETED | OUTPATIENT
Start: 2017-12-14 | End: 2017-12-14

## 2017-12-14 RX ORDER — DEXMEDETOMIDINE HYDROCHLORIDE 4 UG/ML
INJECTION, SOLUTION INTRAVENOUS CONTINUOUS PRN
Status: DISCONTINUED | OUTPATIENT
Start: 2017-12-14 | End: 2017-12-15

## 2017-12-14 RX ORDER — ONDANSETRON 2 MG/ML
4 INJECTION INTRAMUSCULAR; INTRAVENOUS EVERY 6 HOURS PRN
Status: DISCONTINUED | OUTPATIENT
Start: 2017-12-14 | End: 2017-12-16

## 2017-12-14 RX ORDER — LORAZEPAM 2 MG/ML
4 INJECTION INTRAMUSCULAR EVERY 10 MIN PRN
Status: DISCONTINUED | OUTPATIENT
Start: 2017-12-14 | End: 2017-12-14

## 2017-12-14 RX ORDER — IBUPROFEN 400 MG/1
400 TABLET ORAL EVERY 6 HOURS PRN
Status: DISCONTINUED | OUTPATIENT
Start: 2017-12-14 | End: 2017-12-16

## 2017-12-14 RX ORDER — POTASSIUM CHLORIDE 20 MEQ/1
40 TABLET, EXTENDED RELEASE ORAL ONCE
Status: COMPLETED | OUTPATIENT
Start: 2017-12-14 | End: 2017-12-14

## 2017-12-14 RX ORDER — LORAZEPAM 2 MG/ML
1 INJECTION INTRAMUSCULAR
Status: DISCONTINUED | OUTPATIENT
Start: 2017-12-14 | End: 2017-12-15

## 2017-12-14 NOTE — CONSULTS
[facility]  Diabetes Consult Note    Levell Wyalusing Patient Status:  Inpatient    1970 MRN [id number]   [facility] 4SW-A Attending Christopher Mcacrthy MD   Hosp Day # 0 PCP Rosario Rebolledo DO     Reason for Consult:     New Onset Agnes Schroeder Itching    Comment:irriatibility  Norco [Hydrocodone-*    Itching    Comment:Itching, dizziness    Medications: Complete list reviewed. Active diabetes medications include IV insulin as inpatient.     Labs:    Recent Labs   Lab  12/13/17   4271  12/14/17 his wife stated she would attend with him. Discussed appointment times at the [facility].   See AVS.    Due to patient's drowsiness agreed to return tomorrow to begin teaching the glucometer and explained nursing will begin to teach how to admi injection    PRESCRIPTION RECOMMENDATIONS:    · Traditional [place] Medicaid:  · Insulin: Levemir Flextouch and Humalog Kwikpen  · Supplies:  BD pen needles (Katrin); BD syringes  · Glucometer:  One Touch Ultra ([employer])    PROVIDER F/U RECOMMENDATIONS:

## 2017-12-14 NOTE — BH PROGRESS NOTE
Patient states he needs to stop drinking but knows he has to do this on his own. He does not want any referrals because he said he has them. Pt was seen last month and was planning on going to a php etoh program at [facility]. Pts nurse is aware.

## 2017-12-14 NOTE — PROGRESS NOTES
[facility] Pharmacy Note: Dose Adjustment for Unasyn (ampicillin/sulbactam)    Bruno Ferguson is a 41year old male who has been prescribed Unasyn (ampicillin/sulbactam) 1500 mg every 6 hrs.   CrCl is estimated creatinine clearance is 84.2 mL/min (based on SCr

## 2017-12-14 NOTE — ED PROVIDER NOTES
Patient Seen in: [facility] Emergency Department    History   Patient presents with:  Seizure Disorder (neurologic)  Alcohol Intoxication (neurologic)    Stated Complaint: SEIZURES     HPI    80-year-old male presents to the emergency department wit Smokeless tobacco: Never Used                      Alcohol use: Yes           3.6 oz/week     Shots of liquor: 6 per week     Comment: was told to substain in ER      Review of Systems    Positive for stated complaint: SEIZURES   Other systems PANEL (14) - Abnormal; Notable for the following:        Result Value    Glucose 478 (*)     BUN 4 (*)     Alkaline Phosphatase 133 (*)      (*)     Alt 77 (*)     Total Protein 8.8 (*)     Sodium 132 (*)     Potassium 3.0 (*)     Chloride 92 (*) Hyperglycemia R73.9 11/9/2016 Unknown    Hyponatremia E87.1 12/13/2017 Yes

## 2017-12-14 NOTE — PROGRESS NOTES
ICU  Critical Care APN Progress Note    NAME: Lyle Chaparro - ROOM: [address] [place] [address] - MRN: [id number] - Age: 41year old - BVR:9/40/7210    History Of Present Illness:  Lyle Chaparro is a 41year old male with PMHx significant for HTN, Seizure disorder, BMI 25.02 kg/m²   Physical Exam:    General Appearance: Alert, cooperative, tearful at times, appears stated age  Neck: No JVD, neck supple, no adenopathy, trachea midline, no carotid bruits  Lungs: Clear to auscultation bilaterally, respirations unlabored with intensivist on-call, Dr Mary Vásquez, [address] [phone]  Pgr: [phone]

## 2017-12-14 NOTE — PLAN OF CARE
Received pt from ED shortly after midnight. A/O x4. Seizure precautions, no seizure activity. CIWA's 0-10, 2mg ativan given.    When asked if he wants to quit drinking, pt states he \"doesn't want to drink, but he has to drink\" and was tearful when sayin

## 2017-12-14 NOTE — PAYOR COMM NOTE
--------------  ADMISSION REVIEW     Payor: MEDICAID            Patient Seen in: [facility] Emergency Department    History[DW.1]   Patient presents with:  Seizure Disorder   Alcohol Intoxication (neurologic)[DW.2]    Stated Complaint: SEIZURES     HP VASECTOMY        Smoking status: Never Smoker                                                              Smokeless tobacco: Never Used                      Alcohol use: Yes           3.6 oz/week     Shots of liquor: 6 per week     Comment: was told to harris the extremities without deformities. No tenderness. Neurologically intact. [DW.3]         ED Course[DW.1]     Labs Reviewed   COMP METABOLIC PANEL (14) - Abnormal; Notable for the following:        Result Value    Glucose 478 (*)     BUN 4 (*)     Alkalin pm[DW. 2]    Follow-up:[DW.1]  No follow-up provider specified.[DW.2]      Medications Prescribed:[DW.1]  Current Discharge Medication List        Present on Admission  Date Reviewed: 2/24/2017          ICD-10-CM Noted POA    Hyperglycemia R73.9 11/9/2016 U History:   Diagnosis Date   • Back problem    • Depression    • ETOH abuse    • High blood pressure    • Scoliosis    • Seizure disorder (HCC)    • Unspecified essential hypertension         Past Surgical History: Past Surgical History:  1978: OTHER SURGIC Review of Systems:   A comprehensive 14 point review of systems was completed. Pertinent positives and negatives noted in the HPI.     Physical Exam:    /100   Pulse 86   Temp 98 °F (36.7 °C) (Temporal)   Resp 16   Ht 167.6 cm (5' 6\")   Wt 1 Will likely need insulin on discharge. 3. ETOH abuse:  Monitor for withdrawal, CIWA protocol, banana bag  4. Pseudohyponatremia  5. Hypokalemia:  Replenish per electrolyte protocol  6. ETOH Hepatitis:  No abdominal tenderness on deep palapation  7.  HTN: Route User    12/14/2017 0921 Given 6 Units Subcutaneous (Left Upper Arm) Davidson Cruz RN      lactated ringers infusion     Date Action Dose Route User    12/14/2017 0927 New Bag (none) Intravenous Davidson Cruz RN      levETIRAcetam (KEPPRA) 500 5787 Given 150 mg Oral Debora Alonzo RN          REVIEWER COMMENTS:     OTHER:

## 2017-12-14 NOTE — H&P
[facility]  History and Physical     Joe García Patient Status:  Emergency    1970 MRN [id number]   Location [facility] [facility] Attending Frederick Guzman MD   Hosp Day # 0 PCP China Mccallum DO     Chief Complaint Prior to Encounter:  SERTRALINE HCL 50 MG Oral Tab TAKE 3 TABLETS(150 MG) BY MOUTH DAILY Disp: 90 tablet Rfl: 0   ENALAPRIL MALEATE 20 MG Oral Tab TAKE ONE TABLET BY MOUTH ONCE DAILY.  Disp: 30 tablet Rfl: 0   folic acid 1 MG Oral Tab Take 1 tablet (1 mg to extremities. Extremities: No edema or cyanosis. Left middle finger with edema distally, no discrete fluctuance noted  Integument: No rashes or lesions.    Psychiatric: Unable to assess      Diagnostic Data:      Labs:  No results for input(s): WBC, HGB, M

## 2017-12-14 NOTE — PROGRESS NOTES
[facility]  Progress Note     Lenward New Patient Status:  Inpatient    1970 MRN [id number]   [facility] 4SW-A Attending Marco Calle MD   Hosp Day # 0 PCP Yong Power DO     Chief Complaint: Seizure     S: Patient i Aspart Pen  1-10 Units Subcutaneous TID AC and HS   • [START ON 12/15/2017] multivitamin/thiamine/folic acid infusion   Intravenous Q24H   • ampicillin-sulbactam  3 g Intravenous Q8H       ASSESSMENT / PLAN:     1. Seizure disorder with breakthrough   1.  Dorota Coburn

## 2017-12-14 NOTE — CONSULTS
Critical Care Consult     Assessment / Plan:  1. Seizure disorder - concern for medication noncompliance  - Keppra  - neuro to see  2. Etoh abuse  - CIWA  - MVI, thiamine, folic acid  3. DM  - Levemir and Novolog  4. Felon  - Unasyn  5.  Elevated LFTs - lik Take 1 tablet by mouth daily. Disp: 30 tablet Rfl: 0    HydrOXYzine HCl 50 MG Oral Tab Take 1 tablet (50 mg total) by mouth 3 (three) times daily as needed for Anxiety.  Disp: 60 tablet Rfl: 1    Sertraline HCl 50 MG Oral Tab Take 3 tablets (150 mg total) b questions appropriately    Labs:  Reviewed in EMR    Inpatient Medications:  Reviewed in EMR    Imaging:   No chest imaging

## 2017-12-14 NOTE — CONSULTS
[facility]    Report of Consultation    Lisa Bruno Patient Status:  Inpatient    1970 MRN [id number]   [facility] 4SW-A Attending Norma Wagoner MD   Hosp Day # 0 PCP Mary Kate Feliciano DO     Date of Admission:  2017 Facility-Administered Medications:   •  Enalapril Maleate (VASOTEC) tab 20 mg, 20 mg, Oral, Daily  •  levETIRAcetam (KEPPRA) tab 750 mg, 750 mg, Oral, BID  •  Sertraline HCl (ZOLOFT) tab 150 mg, 150 mg, Oral, Daily  •  glucose (DEX4) oral liquid 15 g, 15 g Systems:  A 10-point system was reviewed. Pertinent positives and negatives are noted in HPI. Physical Exam:  Blood pressure 114/85, pulse 74, temperature 98.8 °F (37.1 °C), temperature source Temporal, resp.  rate 12, height 66\", weight 155 lb, SpO2 kidney injury ([address]     Metabolic acidosis     Alcohol withdrawal seizure, uncomplicated (HCC)     Elevated LFTs     Seizure ([address]     Alcohol withdrawal seizure ([address]     Alcohol-induced acute pancreatitis without infection or necrosis     Alcohol-induced

## 2017-12-14 NOTE — ED NOTES
Pt states he does not want to be admitted. He states he wants to walk to the bathroom. Pt was told that he was unsteady on his feet earlier and therefore should use a urinal for his own safety. Pt is angry.  Dr. Carin Busch of notified of pt's wishes for wanting

## 2017-12-14 NOTE — ED INITIAL ASSESSMENT (HPI)
Pt's wife states the pt had a seizure at about 1630 today. She also states that he has been drinking tonight. Pt is not compliant. Pt was transported from the waiting room with a wheelchair and he stated several times that he wanted to walk home.  Pt states

## 2017-12-15 ENCOUNTER — APPOINTMENT (OUTPATIENT)
Dept: ULTRASOUND IMAGING | Facility: HOSPITAL | Age: 47
DRG: 101 | End: 2017-12-15
Attending: STUDENT IN AN ORGANIZED HEALTH CARE EDUCATION/TRAINING PROGRAM
Payer: MEDICAID

## 2017-12-15 PROCEDURE — 99233 SBSQ HOSP IP/OBS HIGH 50: CPT | Performed by: OTHER

## 2017-12-15 PROCEDURE — 99233 SBSQ HOSP IP/OBS HIGH 50: CPT | Performed by: CLINICAL NURSE SPECIALIST

## 2017-12-15 PROCEDURE — 99232 SBSQ HOSP IP/OBS MODERATE 35: CPT | Performed by: STUDENT IN AN ORGANIZED HEALTH CARE EDUCATION/TRAINING PROGRAM

## 2017-12-15 PROCEDURE — 76700 US EXAM ABDOM COMPLETE: CPT | Performed by: STUDENT IN AN ORGANIZED HEALTH CARE EDUCATION/TRAINING PROGRAM

## 2017-12-15 RX ORDER — MAGNESIUM SULFATE HEPTAHYDRATE 40 MG/ML
2 INJECTION, SOLUTION INTRAVENOUS ONCE
Status: COMPLETED | OUTPATIENT
Start: 2017-12-15 | End: 2017-12-15

## 2017-12-15 RX ORDER — LORAZEPAM 2 MG/ML
2 INJECTION INTRAMUSCULAR
Status: DISCONTINUED | OUTPATIENT
Start: 2017-12-15 | End: 2017-12-16

## 2017-12-15 RX ORDER — DIPHENHYDRAMINE HYDROCHLORIDE 50 MG/ML
25 INJECTION INTRAMUSCULAR; INTRAVENOUS ONCE
Status: COMPLETED | OUTPATIENT
Start: 2017-12-16 | End: 2017-12-15

## 2017-12-15 RX ORDER — LORAZEPAM 2 MG/ML
INJECTION INTRAMUSCULAR
Status: COMPLETED
Start: 2017-12-15 | End: 2017-12-15

## 2017-12-15 RX ORDER — LORAZEPAM 2 MG/ML
4 INJECTION INTRAMUSCULAR EVERY 10 MIN PRN
Status: DISCONTINUED | OUTPATIENT
Start: 2017-12-15 | End: 2017-12-16

## 2017-12-15 RX ORDER — LORAZEPAM 2 MG/ML
3 INJECTION INTRAMUSCULAR
Status: DISCONTINUED | OUTPATIENT
Start: 2017-12-15 | End: 2017-12-16

## 2017-12-15 RX ORDER — DIPHENHYDRAMINE HCL 25 MG
25 CAPSULE ORAL ONCE
Status: DISCONTINUED | OUTPATIENT
Start: 2017-12-15 | End: 2017-12-15

## 2017-12-15 RX ORDER — LORAZEPAM 2 MG/ML
1 INJECTION INTRAMUSCULAR EVERY 30 MIN PRN
Status: DISCONTINUED | OUTPATIENT
Start: 2017-12-15 | End: 2017-12-16

## 2017-12-15 RX ORDER — HYDRALAZINE HYDROCHLORIDE 20 MG/ML
10 INJECTION INTRAMUSCULAR; INTRAVENOUS ONCE
Status: COMPLETED | OUTPATIENT
Start: 2017-12-15 | End: 2017-12-15

## 2017-12-15 NOTE — PLAN OF CARE
Diabetes/Glucose Control    • Glucose maintained within prescribed range Progressing          DRUG ABUSE/DETOX    • Will have no detox symptoms and will verbalize plan for changing drug-related behavior Progressing            Assumed pt care at 0730.   Kimberley Sheriff

## 2017-12-15 NOTE — PLAN OF CARE
CARDIOVASCULAR - ADULT    • Maintains optimal cardiac output and hemodynamic stability Not Progressing          DRUG ABUSE/DETOX    • Will have no detox symptoms and will verbalize plan for changing drug-related behavior Not Progressing          Pt receive

## 2017-12-15 NOTE — DIETARY NOTE
Nutrition Short Note    Pt seen d/t HgbA1c of 13.0% with a new diagnosis of diabetes. Pt's wife provided with Diabetic Education including carb counting, label reading, and consistent meals intake. Pt is sleeping. Handout provided.   All questions answere

## 2017-12-15 NOTE — PROGRESS NOTES
[address] [facility]  Diabetes Follow Up      Bruno Phyllis  [id number]       Patient seen and evaluated; wife at bedside. Patient is more alert, but reports being tired.     Recent Labs   Lab  12/14/17   1226  12/14/17   1724  12/14/17   2101  12/1 stated he did. Asked him if he could share it with me and he said \"no, I don't want to share it with anyone as I could jinx myself - I don't talk about my plans - I am superstitious. \"  Explained the risks of alcohol consumption, specifically hypoglycemi supervision once return demonstration has verified patient's skill  · RN to reinforce survival skills: signs, symptoms and treatment for hypo/hyperglycemia, sick day management.   Patient will need to perform a return demonstration on blood glucose monitori

## 2017-12-15 NOTE — PROGRESS NOTES
[facility]  Progress Note     Lyle Chaparro Patient Status:  Inpatient    1970 MRN [id number]   [facility] 4SW-A Attending Arnel To MD   Hosp Day # 1 PCP Arnold Plummer DO     Chief Complaint: Seizure     S: Patient m TROP, CK in the last 72 hours. Imaging: Imaging data reviewed in Epic.     Medications:   • multivitamin/thiamine/folic acid infusion   Intravenous Q24H   • potassium phosphate IVPB  20 mmol Intravenous Once   • insulin detemir  6 Units Subcutaneous

## 2017-12-15 NOTE — PROGRESS NOTES
[facility] [facility] Progress Note    Connor Montes De Oca Patient Status:  Inpatient    1970 MRN [id number]   [facility] 4SW-A Attending Marine Lentz MD   Hosp Day # 1 PCP Gloria Lamb DO     Chief Complaint:   Follow up fo 12/15/2017   MG 1.6 12/15/2017   PHOS 1.7 12/15/2017   PGLU 207 12/15/2017     Imaging:  No new imaging    Impression:  1. Breakthrough seizures  2. Symptomatic localization related epilepsy  3. ETOH abose  4. Hyperglycemia    Plan:  1.  Continue Keppra 750

## 2017-12-16 VITALS
HEART RATE: 63 BPM | HEIGHT: 66 IN | WEIGHT: 149.31 LBS | OXYGEN SATURATION: 99 % | BODY MASS INDEX: 24 KG/M2 | TEMPERATURE: 98 F | SYSTOLIC BLOOD PRESSURE: 158 MMHG | DIASTOLIC BLOOD PRESSURE: 107 MMHG | RESPIRATION RATE: 17 BRPM

## 2017-12-16 PROCEDURE — 0H9FXZZ DRAINAGE OF RIGHT HAND SKIN, EXTERNAL APPROACH: ICD-10-PCS | Performed by: ORTHOPAEDIC SURGERY

## 2017-12-16 PROCEDURE — 99239 HOSP IP/OBS DSCHRG MGMT >30: CPT | Performed by: STUDENT IN AN ORGANIZED HEALTH CARE EDUCATION/TRAINING PROGRAM

## 2017-12-16 PROCEDURE — 99232 SBSQ HOSP IP/OBS MODERATE 35: CPT | Performed by: OTHER

## 2017-12-16 RX ORDER — FOLIC ACID 1 MG/1
1 TABLET ORAL DAILY
Status: DISCONTINUED | OUTPATIENT
Start: 2017-12-16 | End: 2017-12-16

## 2017-12-16 RX ORDER — MELATONIN
100 DAILY
Status: DISCONTINUED | OUTPATIENT
Start: 2017-12-16 | End: 2017-12-16

## 2017-12-16 RX ORDER — BLOOD-GLUCOSE METER
EACH MISCELLANEOUS
Qty: 1 KIT | Refills: 0 | Status: SHIPPED | OUTPATIENT
Start: 2017-12-16 | End: 2018-05-24

## 2017-12-16 RX ORDER — AMOXICILLIN AND CLAVULANATE POTASSIUM 875; 125 MG/1; MG/1
1 TABLET, FILM COATED ORAL 2 TIMES DAILY
Qty: 10 TABLET | Refills: 0 | Status: SHIPPED | OUTPATIENT
Start: 2017-12-16 | End: 2017-12-16

## 2017-12-16 RX ORDER — HYDROCHLOROTHIAZIDE 12.5 MG/1
25 CAPSULE, GELATIN COATED ORAL DAILY
Status: DISCONTINUED | OUTPATIENT
Start: 2017-12-16 | End: 2017-12-16

## 2017-12-16 RX ORDER — HYDROXYZINE 50 MG/1
50 TABLET, FILM COATED ORAL 3 TIMES DAILY PRN
COMMUNITY
End: 2017-12-27

## 2017-12-16 RX ORDER — AMOXICILLIN AND CLAVULANATE POTASSIUM 875; 125 MG/1; MG/1
1 TABLET, FILM COATED ORAL 2 TIMES DAILY
Qty: 10 TABLET | Refills: 0 | Status: SHIPPED | OUTPATIENT
Start: 2017-12-16 | End: 2017-12-21

## 2017-12-16 RX ORDER — INSULIN LISPRO 100 [IU]/ML
INJECTION, SOLUTION INTRAVENOUS; SUBCUTANEOUS
Qty: 1 VIAL | Refills: 3 | Status: SHIPPED | OUTPATIENT
Start: 2017-12-16 | End: 2017-12-16

## 2017-12-16 RX ORDER — MAGNESIUM OXIDE 400 MG (241.3 MG MAGNESIUM) TABLET
400 TABLET ONCE
Status: COMPLETED | OUTPATIENT
Start: 2017-12-16 | End: 2017-12-16

## 2017-12-16 RX ORDER — INSULIN LISPRO 100 [IU]/ML
INJECTION, SOLUTION INTRAVENOUS; SUBCUTANEOUS
Qty: 1 VIAL | Refills: 3 | Status: SHIPPED | OUTPATIENT
Start: 2017-12-16 | End: 2017-12-29

## 2017-12-16 NOTE — PROGRESS NOTES
Patient transferred from ICU. Assumed care at [address]. Patient denies pain. Wife at bedside. Will monitor.

## 2017-12-16 NOTE — PROGRESS NOTES
NURSING DISCHARGE NOTE    Discharged Home via Ambulatory. Accompanied by Family member and Spouse  Belongings Taken by patient/family.     Reviewed discharge instructions, including new diabetic handbook and handouts  Reviewed monitoring blood sugar an

## 2017-12-16 NOTE — PROGRESS NOTES
1  Paged Dr. Primo Forrester for pt. Request of sleep medication. 3643   Paged Dr. Primo Forrester d/t elevating B/P    1589   Per Dr. Sultana Rodriguez is okay w B/P of 150/120-no further orders.

## 2017-12-16 NOTE — PLAN OF CARE
Pt. Is very anxious and states he wants to go home--pt. Can be forgetful. CIWA scale allowed for PRN Ativan-1 mg given  R middle finger is swollen and appears white under cuticle-pt.  Is on Unasyn prophylacticly-will pass on to AM RN to notify MD. Jacki Valdez-

## 2017-12-16 NOTE — CONSULTS
[facility] Pharmacy Note: Route Optimization for Thiamine and Folic Acid. Patient is currently on thiamine  395 mg IV and folic acid 1 mg IV every 24 hours.    The patient meets the criteria to convert to the oral equivalent as established by the IV to Oral co

## 2017-12-16 NOTE — PROGRESS NOTES
Neurology Progress Note    Jennifer Mayen Patient Status:  Inpatient    1970 MRN [id number]   [facility] 3NE-A Attending Fly Thorpe MD   Hosp Day # 2 PCP Gemma Sawant DO     Subjective:  Jennifer Mayen is a(n) 41 year ol Rfl: 0   Glucose Blood (ONETOUCH ULTRA BLUE) In Vitro Strip Test blood glucose 4-5 times per day before meals, bedtime and for signs, symptoms of hypoglycemia or as ordered by physician Disp: 100 strip Rfl: 2   ONETOUCH DELICA LANCETS Does not apply Misc T

## 2017-12-16 NOTE — DISCHARGE SUMMARY
[facility]  DISCHARGE SUMMARY     Deysi Sandra Patient Status:  Inpatient    1970 MRN [id number]   [facility] 3NE-A Attending Patricio Levine MD   [place] Day # 2 PCP Liam Valentin DO     Date of Admission: 2017  Date o of pain in his left middle finger, states he might have bit down on his finger. Brief Synopsis:   Pt admitted to the ICU and evaluated by neurology. He was laoded with Keppra then his regular hoe dose of keppra was continued.  No further seizures while a physician if blood glucose is greater than 351 mg/dL   Quantity:  1 vial  Refills:  3     Insulin Pen Needle 30G X 5 MM Misc  Commonly known as:  BD AUTOSHIELD DUO      Use a new pen needle with each injection as directed by your doctor   Quantity:  100 ea total) by mouth daily. Quantity:  30 tablet  Refills:  0     TraMADol HCl 50 MG Tabs  Commonly known as:  ULTRAM      Take 1 tablet (50 mg total) by mouth every 6 (six) hours as needed.    Quantity:  20 tablet  Refills:  0           Where to Get Your Medi

## 2017-12-17 NOTE — CONSULTS
[facility]    PATIENT'S NAME: Meryle Jamie   ATTENDING PHYSICIAN: Marin Ibarra MD   CONSULTING PHYSICIAN: Charlie Arechiga M.D.    PATIENT ACCOUNT#:   [de-identified]    LOCATION:  [address] [place] [address]  MEDICAL RECORD #:   [id number]       DATE OF BIRTH [birth year]  [facility] [id number]  /

## 2017-12-19 ENCOUNTER — PATIENT OUTREACH (OUTPATIENT)
Dept: CASE MANAGEMENT | Age: 47
End: 2017-12-19

## 2017-12-19 DIAGNOSIS — E11.9 TYPE 2 DIABETES MELLITUS WITHOUT COMPLICATION, WITH LONG-TERM CURRENT USE OF INSULIN (HCC): ICD-10-CM

## 2017-12-19 DIAGNOSIS — G40.909 SEIZURE DISORDER (HCC): ICD-10-CM

## 2017-12-19 DIAGNOSIS — Z79.4 TYPE 2 DIABETES MELLITUS WITHOUT COMPLICATION, WITH LONG-TERM CURRENT USE OF INSULIN (HCC): ICD-10-CM

## 2017-12-22 ENCOUNTER — TELEPHONE (OUTPATIENT)
Dept: FAMILY MEDICINE CLINIC | Facility: CLINIC | Age: 47
End: 2017-12-22

## 2017-12-22 ENCOUNTER — NURSE ONLY (OUTPATIENT)
Dept: ENDOCRINOLOGY CLINIC | Facility: CLINIC | Age: 47
End: 2017-12-22

## 2017-12-22 VITALS
BODY MASS INDEX: 23.5 KG/M2 | HEIGHT: 66.5 IN | DIASTOLIC BLOOD PRESSURE: 115 MMHG | HEART RATE: 81 BPM | SYSTOLIC BLOOD PRESSURE: 175 MMHG | WEIGHT: 148 LBS

## 2017-12-22 DIAGNOSIS — E11.9 TYPE 2 DIABETES MELLITUS WITHOUT COMPLICATION, WITH LONG-TERM CURRENT USE OF INSULIN (HCC): Primary | ICD-10-CM

## 2017-12-22 DIAGNOSIS — Z79.4 TYPE 2 DIABETES MELLITUS WITHOUT COMPLICATION, WITH LONG-TERM CURRENT USE OF INSULIN (HCC): Primary | ICD-10-CM

## 2017-12-22 PROCEDURE — G0108 DIAB MANAGE TRN  PER INDIV: HCPCS

## 2017-12-22 NOTE — PROGRESS NOTES
Alan Pedersen  : 1970 attended Step 1 Diabetic Education:    Date: 2017      BP (!) 175/115   Pulse 81   Ht 66.5\"   Wt 148 lb   BMI 23.53 kg/m²       HgbA1C (%)   Date Value   2017 13.0 (H)   ----------     Depression Screen -  1

## 2017-12-27 ENCOUNTER — OFFICE VISIT (OUTPATIENT)
Dept: FAMILY MEDICINE CLINIC | Facility: CLINIC | Age: 47
End: 2017-12-27

## 2017-12-27 VITALS
HEART RATE: 76 BPM | WEIGHT: 154 LBS | DIASTOLIC BLOOD PRESSURE: 94 MMHG | SYSTOLIC BLOOD PRESSURE: 174 MMHG | BODY MASS INDEX: 24.46 KG/M2 | RESPIRATION RATE: 16 BRPM | HEIGHT: 66.5 IN | TEMPERATURE: 99 F

## 2017-12-27 DIAGNOSIS — Z91.19 MEDICALLY NONCOMPLIANT: ICD-10-CM

## 2017-12-27 DIAGNOSIS — F10.10 ALCOHOL ABUSE: ICD-10-CM

## 2017-12-27 DIAGNOSIS — L03.012 PARONYCHIA OF LEFT MIDDLE FINGER: ICD-10-CM

## 2017-12-27 DIAGNOSIS — R10.12 LUQ ABDOMINAL PAIN: ICD-10-CM

## 2017-12-27 DIAGNOSIS — F41.9 ANXIETY DISORDER, UNSPECIFIED TYPE: ICD-10-CM

## 2017-12-27 DIAGNOSIS — E11.65 TYPE 2 DIABETES MELLITUS WITH HYPERGLYCEMIA, UNSPECIFIED LONG TERM INSULIN USE STATUS: ICD-10-CM

## 2017-12-27 DIAGNOSIS — R56.9 SEIZURE (HCC): ICD-10-CM

## 2017-12-27 DIAGNOSIS — Z09 HOSPITAL DISCHARGE FOLLOW-UP: Primary | ICD-10-CM

## 2017-12-27 DIAGNOSIS — I10 UNCONTROLLED HYPERTENSION: ICD-10-CM

## 2017-12-27 PROCEDURE — 99214 OFFICE O/P EST MOD 30 MIN: CPT | Performed by: NURSE PRACTITIONER

## 2017-12-27 RX ORDER — TRAMADOL HYDROCHLORIDE 50 MG/1
50 TABLET ORAL EVERY 6 HOURS PRN
Qty: 15 TABLET | Refills: 0 | Status: CANCELLED | OUTPATIENT
Start: 2017-12-27

## 2017-12-27 RX ORDER — METOPROLOL TARTRATE 50 MG/1
50 TABLET, FILM COATED ORAL 2 TIMES DAILY
Qty: 60 TABLET | Refills: 1 | Status: ON HOLD | OUTPATIENT
Start: 2017-12-27 | End: 2018-02-13

## 2017-12-27 RX ORDER — METOPROLOL TARTRATE 50 MG/1
TABLET, FILM COATED ORAL
Qty: 180 TABLET | Refills: 1 | OUTPATIENT
Start: 2017-12-27

## 2017-12-27 RX ORDER — LEVETIRACETAM 750 MG/1
TABLET ORAL
Qty: 60 TABLET | Refills: 1 | Status: ON HOLD | OUTPATIENT
Start: 2017-12-27 | End: 2018-02-13

## 2017-12-27 RX ORDER — ENALAPRIL MALEATE 20 MG/1
TABLET ORAL
Qty: 90 TABLET | Refills: 0 | Status: SHIPPED | OUTPATIENT
Start: 2017-12-27 | End: 2018-02-13

## 2017-12-27 RX ORDER — LEVETIRACETAM 750 MG/1
TABLET ORAL
Qty: 180 TABLET | Refills: 1 | OUTPATIENT
Start: 2017-12-27

## 2017-12-27 RX ORDER — DICYCLOMINE HYDROCHLORIDE 10 MG/1
10 CAPSULE ORAL 3 TIMES DAILY PRN
Qty: 30 CAPSULE | Refills: 1 | Status: SHIPPED | OUTPATIENT
Start: 2017-12-27 | End: 2019-01-01

## 2017-12-27 RX ORDER — AMOXICILLIN AND CLAVULANATE POTASSIUM 875; 125 MG/1; MG/1
1 TABLET, FILM COATED ORAL 2 TIMES DAILY
Qty: 20 TABLET | Refills: 0 | Status: SHIPPED | OUTPATIENT
Start: 2017-12-27 | End: 2018-01-06

## 2017-12-27 RX ORDER — HYDROXYZINE 50 MG/1
50 TABLET, FILM COATED ORAL 3 TIMES DAILY PRN
Qty: 30 TABLET | Refills: 0 | Status: ON HOLD | OUTPATIENT
Start: 2017-12-27 | End: 2018-02-13

## 2017-12-27 RX ORDER — ENALAPRIL MALEATE 20 MG/1
TABLET ORAL
Qty: 30 TABLET | Refills: 1 | Status: SHIPPED | OUTPATIENT
Start: 2017-12-27 | End: 2017-12-27

## 2017-12-27 NOTE — PROGRESS NOTES
HPI:    Dayne Li is a 41year old male here today for hospital follow up.    He was discharged from Inpatient hospital, [facility] to Home   Admit Date: 12/13/2017  Discharge Date: 12/16/2017  Hospital Discharge Diagnosis: Seizure, Deepika Cathy reports he did not take the antibiotic and his finger is causing him discomfort today. His hemoglobin A1C was noted to be 13.0 and he was started on insulin and referred to diabetes education.  He reports that he has met with the educator, but reports that glucose is between 261-300 mg/dL Inject 5 units if blood glucose is between 301-350 mg/dL Call your physician if blood glucose is greater than 351 mg/dL   Insulin Pen Needle (BD AUTOSHIELD DUO) 30G X 5 MM Does not apply Misc Use a new pen needle with each weakness  PSYCHE: denies depression or anxiety  HEMATOLOGIC: denies hx of anemia, or bruising, denies bleeding  ENDOCRINE: denies thyroid history  ALL/ASTHMA: denies hx of allergy or asthma    PHYSICAL EXAM:   No LMP for male patient.   Estimated body mass his blood sugar 4 times per day (breakfast, lunch, dinner and before bed) as these readings help guide the treatment plan with his insulin. Continue Levemir as ordered.   Discussed with the patient that Humalog is not a rescue insulin-that he is to be taki 50 MG Oral Tab 30 tablet 0      Sig: Take 1 tablet (50 mg total) by mouth 3 (three) times daily as needed for Anxiety.       levetiracetam 750 MG Oral Tab 60 tablet 1      Sig: TAKE 1 TABLET BY MOUTH 2 TIMES DAILY      Metoprolol Tartrate 50 MG Oral Tab 60

## 2017-12-29 ENCOUNTER — TELEPHONE (OUTPATIENT)
Dept: FAMILY MEDICINE CLINIC | Facility: CLINIC | Age: 47
End: 2017-12-29

## 2017-12-29 RX ORDER — INSULIN LISPRO 100 [IU]/ML
INJECTION, SOLUTION INTRAVENOUS; SUBCUTANEOUS
Qty: 1 VIAL | Refills: 3 | Status: SHIPPED | OUTPATIENT
Start: 2017-12-29 | End: 2018-08-08

## 2017-12-29 NOTE — TELEPHONE ENCOUNTER
Spoke with pt this morning regarding his insulin. Pt states his wife checks his sugar x 2-3 times per day. Checked his sugar in the morning was at 181.  Taking insulin x2 per day, he stated he was too tired last night, did not check his sugar level and did

## 2017-12-29 NOTE — TELEPHONE ENCOUNTER
Please update the patient that he needs to be checking his blood sugar 4 times per day (breakfast, lunch, dinner and before bedtime) and writing his blood sugar readings down. He can continue with the Levemir 6 units two times per day as he has been.  As fo

## 2017-12-29 NOTE — TELEPHONE ENCOUNTER
LM again to repeat orders that [facility] CHARISSE ALANIZ didn't think the patient understood completely.

## 2017-12-31 NOTE — ED NOTES
Spoke to pt wife whom stated she normally feels safe at home with pt but last night she was worried he would hurt himself more than he would hurt family.  Wife states pt was dx with bipolar disorder last admit at [facility]  never did f/u and noncompliant with med

## 2017-12-31 NOTE — ED PROVIDER NOTES
Patient was evaluated by Gia Manley. It is agreed between myself and Gia Manley liaison the patient will require inpatient admission. He is going to be reassessed when he is completely sober. Certificate was already filled out and put on the chart.   L
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
## 2017-12-31 NOTE — ED NOTES
Pt stating there are some family problems at home with older son and his girlfriend living with pt and wife. Pt stating he has been supporting family over last 6mos.  Pt stating it is very stressful

## 2017-12-31 NOTE — ED PROVIDER NOTES
I reviewed that chart and discussed the case. I have examined the patient and noted patient is awake alert and appropriate. Disposition pending per crisis recommendation.       I agree with the following clinical impression(s):  Alcoholic intoxication wit

## 2017-12-31 NOTE — ED PROVIDER NOTES
Patient Seen in: [facility] Emergency Department    History   Patient presents with:  Eval-P (psychiatric)    Stated Complaint: eval p    HPI    44-year-old male with history of bipolar disorder, depression, alcohol abuse, seizure disorder, presents °C) (Temporal)   Resp 17   Wt 69.9 kg   SpO2 97%   BMI 24.50 kg/m²         Physical Exam    GENERAL: Patient is awake, alert, yelling and screaming obscenities.   HEENT: Pupils equal round reactive to light, extraocular muscles are intact, there is no scler CBC W/ DIFFERENTIAL[[id number]]          Abnormal            Final result                 Please view results for these tests on the individual orders.    SCAN SLIDE   PATH COMMENT CBC       ED Course as of Jan 01 0904  -------------------

## 2017-12-31 NOTE — ED NOTES
Pt ate 100% of breakfast and drank apple juice x 4. Pt is AAOX4 +tremors. Pt stating sometimes he has a shot of whiskey in the morning if he has the shakes post drinking .  Pt stating he drives a lyft and he would never drink and drive

## 2017-12-31 NOTE — ED INITIAL ASSESSMENT (HPI)
Pt brought by EMS and police. Pt on restraints on arrival. Pt intoxicated and very aggressive verbally.  As reported by police, wife stated that patient been drinking all day, been acting erratic, pt saying threats to staff on arrival. wife called police, o

## 2017-12-31 NOTE — BH LEVEL OF CARE ASSESSMENT
Level of Care Assessment Note    General Questions  Why are you here?: \"I was in my house and I told my son he could not be in my house and next thing I know police were in my house\"  pt stated he did not invite them in.  Police told him he threatened wif gesture about suicide pt stated he is better off if he  only. No attempts to wifes knowledge. Family's Biggest Areas of Concern: wife called 48 for safety concerns. Pt wife feels safe with pt at home if he is not drinking.  Pt is only aggressive when Threat/Attempt: Yes  Date of Current/Reccent Harm Toward Others Threat/Attempt: 12/30/17  Describe Current/Recent Harm Toward Others Threat/Attempt: Pt stated he said if you dont act right you will get a whoopin'. Pt reports he said that to his son.    Kent Jaxon Access to Means  Access to Means: Yes  Description of Access: household items, medication  Access to Firearm/Weapon: No  Do you have a firearm owner ID card?: No  Access to Means Collateral Provided By[de-identified] wife  Describe Access to Means Collateral: wife prescribed) in the past 30 days?: Yes  Chemical 1  Type of Other Chemical Used: Alcohol (describe)  Amount/Frequency: daily for between 2-3 yrs; typically drinks vodka // 4-5 drinks  Route: Oral  Last Use: tonight; unknown amt of vodka -- pt is not sure ho memory intact; Remote memory intact  Orientation Level: Oriented X4  Thought Characteristics: Alert;Guarded  Judgment: Poor (Comment)  Insight: Poor (Comment)    Assessment Summary  Assessment Summary: Pt is a 42 yo male who was brouth to er for aggressive

## 2017-12-31 NOTE — ED NOTES
Pt sleeping warm blankets issued and breakfast offered. Pt refused stating he is leaving soon. Emotional support offered informing pt he would need an assessment from [facility].

## 2018-01-01 ENCOUNTER — ANESTHESIA (OUTPATIENT)
Dept: ENDOSCOPY | Facility: HOSPITAL | Age: 48
End: 2018-01-01

## 2018-01-01 ENCOUNTER — TELEPHONE (OUTPATIENT)
Dept: FAMILY MEDICINE CLINIC | Facility: CLINIC | Age: 48
End: 2018-01-01

## 2018-01-01 ENCOUNTER — APPOINTMENT (OUTPATIENT)
Dept: GENERAL RADIOLOGY | Facility: HOSPITAL | Age: 48
DRG: 896 | End: 2018-01-01
Attending: HOSPITALIST
Payer: MEDICAID

## 2018-01-01 ENCOUNTER — OFFICE VISIT (OUTPATIENT)
Dept: FAMILY MEDICINE CLINIC | Facility: CLINIC | Age: 48
End: 2018-01-01
Payer: MEDICAID

## 2018-01-01 ENCOUNTER — HOSPITAL ENCOUNTER (INPATIENT)
Facility: HOSPITAL | Age: 48
LOS: 6 days | Discharge: HOME | DRG: 896 | End: 2018-01-01
Attending: STUDENT IN AN ORGANIZED HEALTH CARE EDUCATION/TRAINING PROGRAM | Admitting: HOSPITALIST
Payer: MEDICAID

## 2018-01-01 ENCOUNTER — ANESTHESIA EVENT (OUTPATIENT)
Dept: ENDOSCOPY | Facility: HOSPITAL | Age: 48
End: 2018-01-01

## 2018-01-01 ENCOUNTER — HOSPITAL ENCOUNTER (OUTPATIENT)
Dept: CV DIAGNOSTICS | Facility: HOSPITAL | Age: 48
Discharge: HOME | End: 2018-01-01
Attending: FAMILY MEDICINE
Payer: MEDICAID

## 2018-01-01 ENCOUNTER — APPOINTMENT (OUTPATIENT)
Dept: CT IMAGING | Facility: HOSPITAL | Age: 48
DRG: 896 | End: 2018-01-01
Attending: INTERNAL MEDICINE
Payer: MEDICAID

## 2018-01-01 ENCOUNTER — PATIENT OUTREACH (OUTPATIENT)
Dept: CASE MANAGEMENT | Age: 48
End: 2018-01-01

## 2018-01-01 VITALS
WEIGHT: 159 LBS | OXYGEN SATURATION: 100 % | TEMPERATURE: 98 F | BODY MASS INDEX: 25.25 KG/M2 | DIASTOLIC BLOOD PRESSURE: 80 MMHG | HEART RATE: 83 BPM | SYSTOLIC BLOOD PRESSURE: 120 MMHG | RESPIRATION RATE: 15 BRPM | HEIGHT: 66.5 IN

## 2018-01-01 VITALS
BODY MASS INDEX: 25.73 KG/M2 | HEART RATE: 74 BPM | SYSTOLIC BLOOD PRESSURE: 160 MMHG | RESPIRATION RATE: 14 BRPM | DIASTOLIC BLOOD PRESSURE: 90 MMHG | HEIGHT: 66.5 IN | WEIGHT: 162 LBS

## 2018-01-01 VITALS
DIASTOLIC BLOOD PRESSURE: 89 MMHG | HEART RATE: 86 BPM | HEIGHT: 67 IN | WEIGHT: 160.25 LBS | RESPIRATION RATE: 18 BRPM | TEMPERATURE: 99 F | BODY MASS INDEX: 25.15 KG/M2 | OXYGEN SATURATION: 98 % | SYSTOLIC BLOOD PRESSURE: 130 MMHG

## 2018-01-01 DIAGNOSIS — R06.02 SOB (SHORTNESS OF BREATH): ICD-10-CM

## 2018-01-01 DIAGNOSIS — J31.0 NONALLERGIC RHINITIS: ICD-10-CM

## 2018-01-01 DIAGNOSIS — F32.A DEPRESSIVE DISORDER: ICD-10-CM

## 2018-01-01 DIAGNOSIS — Z23 NEED FOR VACCINATION: ICD-10-CM

## 2018-01-01 DIAGNOSIS — E11.65 TYPE 2 DIABETES MELLITUS WITH HYPERGLYCEMIA, WITH LONG-TERM CURRENT USE OF INSULIN (HCC): ICD-10-CM

## 2018-01-01 DIAGNOSIS — I10 ESSENTIAL HYPERTENSION: ICD-10-CM

## 2018-01-01 DIAGNOSIS — M54.9 MID BACK PAIN: ICD-10-CM

## 2018-01-01 DIAGNOSIS — E78.5 DYSLIPIDEMIA: ICD-10-CM

## 2018-01-01 DIAGNOSIS — R07.81 RIB PAIN ON LEFT SIDE: ICD-10-CM

## 2018-01-01 DIAGNOSIS — D64.9 ANEMIA, UNSPECIFIED TYPE: ICD-10-CM

## 2018-01-01 DIAGNOSIS — M79.602 LEFT ARM PAIN: ICD-10-CM

## 2018-01-01 DIAGNOSIS — I10 UNCONTROLLED HYPERTENSION: ICD-10-CM

## 2018-01-01 DIAGNOSIS — E16.2 HYPOGLYCEMIA: ICD-10-CM

## 2018-01-01 DIAGNOSIS — Z79.4 TYPE 2 DIABETES MELLITUS WITH HYPERGLYCEMIA, WITH LONG-TERM CURRENT USE OF INSULIN (HCC): ICD-10-CM

## 2018-01-01 DIAGNOSIS — I48.0 PAF (PAROXYSMAL ATRIAL FIBRILLATION) (HCC): ICD-10-CM

## 2018-01-01 DIAGNOSIS — E11.9 DIABETES MELLITUS TYPE 2 IN NONOBESE (HCC): ICD-10-CM

## 2018-01-01 DIAGNOSIS — I10 BENIGN ESSENTIAL HTN: ICD-10-CM

## 2018-01-01 DIAGNOSIS — Z00.00 LABORATORY EXAM ORDERED AS PART OF ROUTINE GENERAL MEDICAL EXAMINATION: ICD-10-CM

## 2018-01-01 DIAGNOSIS — R94.31 ABNORMAL EKG: ICD-10-CM

## 2018-01-01 DIAGNOSIS — Z09 HOSPITAL DISCHARGE FOLLOW-UP: Primary | ICD-10-CM

## 2018-01-01 DIAGNOSIS — K85.20 ALCOHOL-INDUCED ACUTE PANCREATITIS WITHOUT INFECTION OR NECROSIS: ICD-10-CM

## 2018-01-01 DIAGNOSIS — K63.3 COLONIC ULCER: ICD-10-CM

## 2018-01-01 DIAGNOSIS — R56.9 SEIZURE (HCC): ICD-10-CM

## 2018-01-01 DIAGNOSIS — E01.0 THYROMEGALY: ICD-10-CM

## 2018-01-01 DIAGNOSIS — E83.42 HYPOMAGNESEMIA: ICD-10-CM

## 2018-01-01 DIAGNOSIS — K92.2 GASTROINTESTINAL HEMORRHAGE, UNSPECIFIED GASTROINTESTINAL HEMORRHAGE TYPE: Primary | ICD-10-CM

## 2018-01-01 DIAGNOSIS — M54.6 CHRONIC LEFT-SIDED THORACIC BACK PAIN: ICD-10-CM

## 2018-01-01 DIAGNOSIS — G89.29 CHRONIC LEFT-SIDED THORACIC BACK PAIN: ICD-10-CM

## 2018-01-01 DIAGNOSIS — F10.10 ETOH ABUSE: ICD-10-CM

## 2018-01-01 DIAGNOSIS — R39.15 URINARY URGENCY: ICD-10-CM

## 2018-01-01 DIAGNOSIS — R11.0 NAUSEA: ICD-10-CM

## 2018-01-01 DIAGNOSIS — K92.0 COFFEE GROUND EMESIS: ICD-10-CM

## 2018-01-01 DIAGNOSIS — K92.1 MELENA: ICD-10-CM

## 2018-01-01 DIAGNOSIS — F10.20 SEVERE ALCOHOL USE DISORDER (HCC): ICD-10-CM

## 2018-01-01 DIAGNOSIS — N28.9 RENAL INSUFFICIENCY: ICD-10-CM

## 2018-01-01 DIAGNOSIS — R35.0 INCREASED FREQUENCY OF URINATION: ICD-10-CM

## 2018-01-01 DIAGNOSIS — Z79.4 TYPE 2 DIABETES MELLITUS WITH HYPERGLYCEMIA, WITH LONG-TERM CURRENT USE OF INSULIN (HCC): Primary | ICD-10-CM

## 2018-01-01 DIAGNOSIS — D52.0 DIETARY FOLATE DEFICIENCY ANEMIA: ICD-10-CM

## 2018-01-01 DIAGNOSIS — K70.30 ALCOHOLIC CIRRHOSIS OF LIVER WITHOUT ASCITES (HCC): ICD-10-CM

## 2018-01-01 DIAGNOSIS — E55.9 VITAMIN D DEFICIENCY: ICD-10-CM

## 2018-01-01 DIAGNOSIS — E11.65 TYPE 2 DIABETES MELLITUS WITH HYPERGLYCEMIA, WITH LONG-TERM CURRENT USE OF INSULIN (HCC): Primary | ICD-10-CM

## 2018-01-01 DIAGNOSIS — F10.29 ALCOHOL DEPENDENCE WITH UNSPECIFIED ALCOHOL-INDUCED DISORDER (HCC): ICD-10-CM

## 2018-01-01 DIAGNOSIS — IMO0001 THIRST: ICD-10-CM

## 2018-01-01 DIAGNOSIS — F10.20 ALCOHOLISM (HCC): ICD-10-CM

## 2018-01-01 DIAGNOSIS — E87.8 ABNORMAL BLOOD ELECTROLYTE LEVEL: ICD-10-CM

## 2018-01-01 DIAGNOSIS — D50.9 IRON DEFICIENCY ANEMIA, UNSPECIFIED IRON DEFICIENCY ANEMIA TYPE: ICD-10-CM

## 2018-01-01 DIAGNOSIS — D69.6 THROMBOCYTOPENIA (HCC): ICD-10-CM

## 2018-01-01 DIAGNOSIS — R53.83 FATIGUE, UNSPECIFIED TYPE: ICD-10-CM

## 2018-01-01 PROCEDURE — 90471 IMMUNIZATION ADMIN: CPT | Performed by: FAMILY MEDICINE

## 2018-01-01 PROCEDURE — 99232 SBSQ HOSP IP/OBS MODERATE 35: CPT | Performed by: HOSPITALIST

## 2018-01-01 PROCEDURE — 71045 X-RAY EXAM CHEST 1 VIEW: CPT | Performed by: HOSPITALIST

## 2018-01-01 PROCEDURE — 78452 HT MUSCLE IMAGE SPECT MULT: CPT | Performed by: FAMILY MEDICINE

## 2018-01-01 PROCEDURE — 93018 CV STRESS TEST I&R ONLY: CPT | Performed by: FAMILY MEDICINE

## 2018-01-01 PROCEDURE — 99233 SBSQ HOSP IP/OBS HIGH 50: CPT | Performed by: HOSPITALIST

## 2018-01-01 PROCEDURE — 90686 IIV4 VACC NO PRSV 0.5 ML IM: CPT | Performed by: FAMILY MEDICINE

## 2018-01-01 PROCEDURE — 93017 CV STRESS TEST TRACING ONLY: CPT | Performed by: FAMILY MEDICINE

## 2018-01-01 PROCEDURE — 82962 GLUCOSE BLOOD TEST: CPT | Performed by: FAMILY MEDICINE

## 2018-01-01 PROCEDURE — 74177 CT ABD & PELVIS W/CONTRAST: CPT | Performed by: INTERNAL MEDICINE

## 2018-01-01 PROCEDURE — 0DB98ZX EXCISION OF DUODENUM, VIA NATURAL OR ARTIFICIAL OPENING ENDOSCOPIC, DIAGNOSTIC: ICD-10-PCS | Performed by: INTERNAL MEDICINE

## 2018-01-01 PROCEDURE — 99215 OFFICE O/P EST HI 40 MIN: CPT | Performed by: FAMILY MEDICINE

## 2018-01-01 PROCEDURE — 93000 ELECTROCARDIOGRAM COMPLETE: CPT | Performed by: FAMILY MEDICINE

## 2018-01-01 PROCEDURE — 0DB68ZX EXCISION OF STOMACH, VIA NATURAL OR ARTIFICIAL OPENING ENDOSCOPIC, DIAGNOSTIC: ICD-10-PCS | Performed by: INTERNAL MEDICINE

## 2018-01-01 PROCEDURE — 85018 HEMOGLOBIN: CPT | Performed by: FAMILY MEDICINE

## 2018-01-01 PROCEDURE — 99223 1ST HOSP IP/OBS HIGH 75: CPT | Performed by: HOSPITALIST

## 2018-01-01 RX ORDER — POTASSIUM CHLORIDE 20 MEQ/1
40 TABLET, EXTENDED RELEASE ORAL EVERY 4 HOURS
Status: COMPLETED | OUTPATIENT
Start: 2018-01-01 | End: 2018-01-01

## 2018-01-01 RX ORDER — ENALAPRIL MALEATE 20 MG/1
TABLET ORAL
Qty: 30 TABLET | Refills: 2 | Status: SHIPPED | OUTPATIENT
Start: 2018-01-01 | End: 2019-01-01

## 2018-01-01 RX ORDER — POTASSIUM CHLORIDE 14.9 MG/ML
20 INJECTION INTRAVENOUS ONCE
Status: COMPLETED | OUTPATIENT
Start: 2018-01-01 | End: 2018-01-01

## 2018-01-01 RX ORDER — ENALAPRIL MALEATE 20 MG/1
TABLET ORAL
Qty: 30 TABLET | Refills: 0 | Status: SHIPPED | OUTPATIENT
Start: 2018-01-01 | End: 2018-01-01

## 2018-01-01 RX ORDER — QUETIAPINE 100 MG/1
100 TABLET, FILM COATED ORAL NIGHTLY
Qty: 30 TABLET | Refills: 0 | Status: SHIPPED | OUTPATIENT
Start: 2018-01-01 | End: 2019-01-01

## 2018-01-01 RX ORDER — MAGNESIUM OXIDE 400 MG (241.3 MG MAGNESIUM) TABLET
800 TABLET ONCE
Status: DISCONTINUED | OUTPATIENT
Start: 2018-01-01 | End: 2018-01-01

## 2018-01-01 RX ORDER — ONDANSETRON 2 MG/ML
4 INJECTION INTRAMUSCULAR; INTRAVENOUS EVERY 6 HOURS PRN
Status: DISCONTINUED | OUTPATIENT
Start: 2018-01-01 | End: 2018-01-01

## 2018-01-01 RX ORDER — FOLIC ACID 1 MG/1
1 TABLET ORAL DAILY
Status: DISCONTINUED | OUTPATIENT
Start: 2018-01-01 | End: 2018-01-01

## 2018-01-01 RX ORDER — POTASSIUM CHLORIDE 20 MEQ/1
40 TABLET, EXTENDED RELEASE ORAL ONCE
Status: DISCONTINUED | OUTPATIENT
Start: 2018-01-01 | End: 2018-01-01 | Stop reason: ALTCHOICE

## 2018-01-01 RX ORDER — MORPHINE SULFATE 4 MG/ML
1 INJECTION, SOLUTION INTRAMUSCULAR; INTRAVENOUS EVERY 2 HOUR PRN
Status: DISCONTINUED | OUTPATIENT
Start: 2018-01-01 | End: 2018-01-01

## 2018-01-01 RX ORDER — PANTOPRAZOLE SODIUM 40 MG/1
40 TABLET, DELAYED RELEASE ORAL
Qty: 60 TABLET | Refills: 0 | Status: SHIPPED | OUTPATIENT
Start: 2018-01-01 | End: 2018-01-01

## 2018-01-01 RX ORDER — PANTOPRAZOLE SODIUM 40 MG/1
40 TABLET, DELAYED RELEASE ORAL
Qty: 30 TABLET | Refills: 2 | Status: SHIPPED | OUTPATIENT
Start: 2018-01-01

## 2018-01-01 RX ORDER — MAGNESIUM SULFATE HEPTAHYDRATE 40 MG/ML
2 INJECTION, SOLUTION INTRAVENOUS ONCE
Status: COMPLETED | OUTPATIENT
Start: 2018-01-01 | End: 2018-01-01

## 2018-01-01 RX ORDER — LORAZEPAM 1 MG/1
1 TABLET ORAL
Status: DISCONTINUED | OUTPATIENT
Start: 2018-01-01 | End: 2018-01-01

## 2018-01-01 RX ORDER — TRAMADOL HYDROCHLORIDE 50 MG/1
50 TABLET ORAL EVERY 6 HOURS PRN
Status: DISCONTINUED | OUTPATIENT
Start: 2018-01-01 | End: 2018-01-01

## 2018-01-01 RX ORDER — MAGNESIUM OXIDE 400 MG (241.3 MG MAGNESIUM) TABLET
200 TABLET 2 TIMES DAILY WITH MEALS
Status: COMPLETED | OUTPATIENT
Start: 2018-01-01 | End: 2018-01-01

## 2018-01-01 RX ORDER — SODIUM CHLORIDE 9 MG/ML
INJECTION, SOLUTION INTRAVENOUS CONTINUOUS
Status: DISCONTINUED | OUTPATIENT
Start: 2018-01-01 | End: 2018-01-01

## 2018-01-01 RX ORDER — HYDROXYZINE HYDROCHLORIDE 25 MG/1
50 TABLET, FILM COATED ORAL 3 TIMES DAILY PRN
Status: DISCONTINUED | OUTPATIENT
Start: 2018-01-01 | End: 2018-01-01

## 2018-01-01 RX ORDER — MAGNESIUM OXIDE 400 MG (241.3 MG MAGNESIUM) TABLET
800 TABLET ONCE
Status: COMPLETED | OUTPATIENT
Start: 2018-01-01 | End: 2018-01-01

## 2018-01-01 RX ORDER — DIVALPROEX SODIUM 500 MG/1
TABLET, EXTENDED RELEASE ORAL
Qty: 60 TABLET | Refills: 0 | OUTPATIENT
Start: 2018-01-01

## 2018-01-01 RX ORDER — MORPHINE SULFATE 4 MG/ML
4 INJECTION, SOLUTION INTRAMUSCULAR; INTRAVENOUS EVERY 2 HOUR PRN
Status: DISCONTINUED | OUTPATIENT
Start: 2018-01-01 | End: 2018-01-01

## 2018-01-01 RX ORDER — DEXTROSE AND SODIUM CHLORIDE 5; .9 G/100ML; G/100ML
INJECTION, SOLUTION INTRAVENOUS CONTINUOUS
Status: DISCONTINUED | OUTPATIENT
Start: 2018-01-01 | End: 2018-01-01

## 2018-01-01 RX ORDER — TRAMADOL HYDROCHLORIDE 50 MG/1
TABLET ORAL
Qty: 60 TABLET | Refills: 0 | Status: SHIPPED | OUTPATIENT
Start: 2018-01-01 | End: 2018-01-01

## 2018-01-01 RX ORDER — MORPHINE SULFATE 4 MG/ML
2 INJECTION, SOLUTION INTRAMUSCULAR; INTRAVENOUS EVERY 2 HOUR PRN
Status: DISCONTINUED | OUTPATIENT
Start: 2018-01-01 | End: 2018-01-01

## 2018-01-01 RX ORDER — DEXMEDETOMIDINE HYDROCHLORIDE 4 UG/ML
INJECTION, SOLUTION INTRAVENOUS CONTINUOUS PRN
Status: DISCONTINUED | OUTPATIENT
Start: 2018-01-01 | End: 2018-01-01

## 2018-01-01 RX ORDER — LORAZEPAM 2 MG/ML
1 INJECTION INTRAMUSCULAR
Status: DISCONTINUED | OUTPATIENT
Start: 2018-01-01 | End: 2018-01-01

## 2018-01-01 RX ORDER — ONDANSETRON 4 MG/1
4 TABLET, FILM COATED ORAL EVERY 8 HOURS PRN
Qty: 30 TABLET | Refills: 1 | Status: SHIPPED | OUTPATIENT
Start: 2018-01-01

## 2018-01-01 RX ORDER — POTASSIUM CHLORIDE 20 MEQ/1
40 TABLET, EXTENDED RELEASE ORAL ONCE
Status: COMPLETED | OUTPATIENT
Start: 2018-01-01 | End: 2018-01-01

## 2018-01-01 RX ORDER — MELATONIN
100 DAILY
Status: DISCONTINUED | OUTPATIENT
Start: 2018-01-01 | End: 2018-01-01

## 2018-01-01 RX ORDER — TRAMADOL HYDROCHLORIDE 50 MG/1
TABLET ORAL
Qty: 60 TABLET | Refills: 0 | Status: ON HOLD | OUTPATIENT
Start: 2018-01-01 | End: 2019-01-01

## 2018-01-01 RX ORDER — DIVALPROEX SODIUM 500 MG/1
500 TABLET, EXTENDED RELEASE ORAL 2 TIMES DAILY
Status: DISCONTINUED | OUTPATIENT
Start: 2018-01-01 | End: 2018-01-01

## 2018-01-01 RX ORDER — LORAZEPAM 1 MG/1
2 TABLET ORAL
Status: DISCONTINUED | OUTPATIENT
Start: 2018-01-01 | End: 2018-01-01

## 2018-01-01 RX ORDER — LORAZEPAM 2 MG/ML
4 INJECTION INTRAMUSCULAR
Status: DISCONTINUED | OUTPATIENT
Start: 2018-01-01 | End: 2018-01-01

## 2018-01-01 RX ORDER — ACETAMINOPHEN 325 MG/1
650 TABLET ORAL EVERY 6 HOURS PRN
Status: DISCONTINUED | OUTPATIENT
Start: 2018-01-01 | End: 2018-01-01

## 2018-01-01 RX ORDER — DIVALPROEX SODIUM 500 MG/1
TABLET, EXTENDED RELEASE ORAL
Qty: 60 TABLET | Refills: 0 | Status: SHIPPED | OUTPATIENT
Start: 2018-01-01 | End: 2018-01-01

## 2018-01-01 RX ORDER — ONDANSETRON 2 MG/ML
4 INJECTION INTRAMUSCULAR; INTRAVENOUS ONCE
Status: COMPLETED | OUTPATIENT
Start: 2018-01-01 | End: 2018-01-01

## 2018-01-01 RX ORDER — LABETALOL HYDROCHLORIDE 5 MG/ML
10 INJECTION, SOLUTION INTRAVENOUS EVERY 6 HOURS PRN
Status: DISCONTINUED | OUTPATIENT
Start: 2018-01-01 | End: 2018-01-01

## 2018-01-01 RX ORDER — LORAZEPAM 2 MG/ML
2 INJECTION INTRAMUSCULAR EVERY 30 MIN PRN
Status: DISCONTINUED | OUTPATIENT
Start: 2018-01-01 | End: 2018-01-01

## 2018-01-01 RX ORDER — LORAZEPAM 2 MG/ML
3 INJECTION INTRAMUSCULAR EVERY 30 MIN PRN
Status: DISCONTINUED | OUTPATIENT
Start: 2018-01-01 | End: 2018-01-01

## 2018-01-01 RX ORDER — DIVALPROEX SODIUM 500 MG/1
TABLET, EXTENDED RELEASE ORAL
Qty: 60 TABLET | Refills: 2 | Status: SHIPPED | OUTPATIENT
Start: 2018-01-01 | End: 2019-01-01

## 2018-01-01 RX ORDER — HYDRALAZINE HYDROCHLORIDE 20 MG/ML
10 INJECTION INTRAMUSCULAR; INTRAVENOUS EVERY 6 HOURS PRN
Status: DISCONTINUED | OUTPATIENT
Start: 2018-01-01 | End: 2018-01-01

## 2018-01-01 RX ORDER — ENALAPRIL MALEATE 10 MG/1
20 TABLET ORAL DAILY
Status: DISCONTINUED | OUTPATIENT
Start: 2018-01-01 | End: 2018-01-01

## 2018-01-01 RX ORDER — SERTRALINE HYDROCHLORIDE 100 MG/1
100 TABLET, FILM COATED ORAL DAILY
Qty: 90 TABLET | Refills: 0 | Status: SHIPPED | OUTPATIENT
Start: 2018-01-01 | End: 2019-01-01

## 2018-01-01 RX ORDER — METOCLOPRAMIDE HYDROCHLORIDE 5 MG/ML
10 INJECTION INTRAMUSCULAR; INTRAVENOUS EVERY 8 HOURS PRN
Status: DISCONTINUED | OUTPATIENT
Start: 2018-01-01 | End: 2018-01-01

## 2018-01-01 RX ORDER — PANTOPRAZOLE SODIUM 40 MG/1
40 TABLET, DELAYED RELEASE ORAL
Qty: 60 TABLET | Refills: 0 | Status: SHIPPED | OUTPATIENT
Start: 2018-01-01 | End: 2018-01-01 | Stop reason: SDUPTHER

## 2018-01-01 RX ORDER — FOLIC ACID 1 MG/1
1 TABLET ORAL DAILY
Qty: 30 TABLET | Refills: 5 | Status: SHIPPED | OUTPATIENT
Start: 2018-01-01 | End: 2019-01-01

## 2018-01-01 RX ORDER — ENALAPRIL MALEATE 20 MG/1
TABLET ORAL
Qty: 30 TABLET | Refills: 0 | Status: CANCELLED | OUTPATIENT
Start: 2018-01-01

## 2018-01-01 RX ORDER — DEXTROSE MONOHYDRATE 25 G/50ML
50 INJECTION, SOLUTION INTRAVENOUS
Status: DISCONTINUED | OUTPATIENT
Start: 2018-01-01 | End: 2018-01-01

## 2018-01-01 RX ORDER — ENALAPRIL MALEATE 20 MG/1
TABLET ORAL
Qty: 30 TABLET | Refills: 0 | OUTPATIENT
Start: 2018-01-01

## 2018-01-01 RX ORDER — QUETIAPINE 100 MG/1
100 TABLET, FILM COATED ORAL NIGHTLY
Status: DISCONTINUED | OUTPATIENT
Start: 2018-01-01 | End: 2018-01-01

## 2018-01-01 RX ORDER — INSULIN LISPRO 100 [IU]/ML
INJECTION, SOLUTION INTRAVENOUS; SUBCUTANEOUS
Qty: 1 VIAL | Refills: 3 | Status: SHIPPED | OUTPATIENT
Start: 2018-01-01 | End: 2018-01-01 | Stop reason: ALTCHOICE

## 2018-01-01 RX ORDER — ONDANSETRON 4 MG/1
TABLET, FILM COATED ORAL
Qty: 60 TABLET | Refills: 1 | Status: SHIPPED | OUTPATIENT
Start: 2018-01-01 | End: 2019-01-01

## 2018-01-01 RX ORDER — LOPERAMIDE HYDROCHLORIDE 2 MG/1
2 CAPSULE ORAL 4 TIMES DAILY PRN
Status: DISCONTINUED | OUTPATIENT
Start: 2018-01-01 | End: 2018-01-01

## 2018-01-01 RX ORDER — ONDANSETRON 4 MG/1
TABLET, FILM COATED ORAL
Qty: 30 TABLET | Refills: 0 | Status: SHIPPED | OUTPATIENT
Start: 2018-01-01 | End: 2018-01-01

## 2018-01-01 RX ORDER — METOPROLOL TARTRATE 50 MG/1
50 TABLET, FILM COATED ORAL 2 TIMES DAILY
Qty: 60 TABLET | Refills: 3 | Status: SHIPPED | OUTPATIENT
Start: 2018-01-01

## 2018-01-01 RX ORDER — IBUPROFEN 200 MG
800 TABLET ORAL DAILY
Status: ON HOLD | COMMUNITY
End: 2018-01-01

## 2018-01-01 RX ORDER — LORAZEPAM 2 MG/ML
2 INJECTION INTRAMUSCULAR
Status: DISCONTINUED | OUTPATIENT
Start: 2018-01-01 | End: 2018-01-01

## 2018-01-01 RX ORDER — MELATONIN
100 DAILY
Qty: 90 TABLET | Refills: 1 | Status: SHIPPED | OUTPATIENT
Start: 2018-01-01

## 2018-01-02 NOTE — TELEPHONE ENCOUNTER
I spoke with pt.s spouse Shu Flores. She is on pt.s HIPPA. She stated\" I am clear on the directions for my husbands levemir and the sliding scale for the Humalog. \" \" [facility]. \" she

## 2018-01-02 NOTE — TELEPHONE ENCOUNTER
Thank you for the update. Can we have her call with Renetta's readings-as given his report last week of \"300's\" \"200's\" and \"80's\" I just want to make sure that his sliding scale is appropriate for his blood sugar levels. Thanks.

## 2018-01-03 NOTE — TELEPHONE ENCOUNTER
Call to pt-advised we spoke with bina/spouse yesterday, she mentioned plan for diab ed last night and we left yadira gardner call back today with blood sugar readings.   Pt sts \"my sister ended up in hosp so we have been running around with that today, don't

## 2018-01-06 ENCOUNTER — APPOINTMENT (OUTPATIENT)
Dept: CT IMAGING | Facility: HOSPITAL | Age: 48
End: 2018-01-06
Attending: EMERGENCY MEDICINE
Payer: MEDICAID

## 2018-01-06 PROCEDURE — 70450 CT HEAD/BRAIN W/O DYE: CPT | Performed by: EMERGENCY MEDICINE

## 2018-01-06 NOTE — ED INITIAL ASSESSMENT (HPI)
Pt presents to the ED accompanied by family with complaints of vomiting since approximately 10-11pm. Pt was vomiting throughout the night and this morning wife state his blood pressure was elevated and pt appeared shaky and sweaty.  Pt admits to drinking al

## 2018-01-06 NOTE — ED PROVIDER NOTES
Patient Seen in: [facility] Emergency Department    History   Patient presents with:  Nausea/Vomiting/Diarrhea (gastrointestinal)    Stated Complaint: N/V since last night    HPI    Patient is a 24-year-old male comes emergency room for evaluation o Physical Exam    GENERAL: No acute distress, well appearing and non-toxic, Alert and oriented X 3   HEENT: Normocephalic, atraumatic. Moist mucous membranes.   Pupils equal round reactive to light accommodation, extraocular motion is intact, sclera -----------         ------                     CBC W/ DIFFERENTIAL[[id number]]          Abnormal            Final result                 Please view results for these tests on the individual orders.    URINALYSIS WITH CULTURE REFLEX extra-axial fluid collection. No evidence of an acute intracranial abnormality.    Dictated by: Lynn Oppenheim, MD on 1/06/2018 at 13:24     Approved by: Lynn Oppenheim, MD                ED Course as of Jan 06 1412  --------------------------------- verbal and written discharge and follow-up instructions were provided to help prevent relapse or worsening.   Patient was instructed to follow-up with her primary care provider for further evaluation and treatment, but to return immediately to the ER for wo

## 2018-01-08 NOTE — TELEPHONE ENCOUNTER
No call back from pt or spouse. Record shows ER 1/6/18 alcohol abuse (4th ER visit since 11/1/17)-recommendation to treat as outpt. Discussed with blane WEIR-this call closed.

## 2018-01-22 ENCOUNTER — TELEPHONE (OUTPATIENT)
Dept: FAMILY MEDICINE CLINIC | Facility: CLINIC | Age: 48
End: 2018-01-22

## 2018-01-22 NOTE — TELEPHONE ENCOUNTER
Pt recently admitted (1/18/18-1/21/18) to [facility] for alcohol withdrawal. Pt to set up follow up appointment.

## 2018-01-26 ENCOUNTER — PATIENT MESSAGE (OUTPATIENT)
Dept: FAMILY MEDICINE CLINIC | Facility: CLINIC | Age: 48
End: 2018-01-26

## 2018-02-06 RX ORDER — ENALAPRIL MALEATE 10 MG/1
TABLET ORAL
Qty: 90 TABLET | Refills: 0 | OUTPATIENT
Start: 2018-02-06

## 2018-02-06 RX ORDER — DIAZEPAM 5 MG/1
TABLET ORAL
Qty: 30 TABLET | Refills: 0 | OUTPATIENT
Start: 2018-02-06

## 2018-02-06 NOTE — TELEPHONE ENCOUNTER
Pharmacy called on this. I let them know pt needs appt and that we have tried reaching several times. They are denying for now otherwise they will have to keep calling us to have authorize.
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
## 2018-02-11 NOTE — ED NOTES
Pt wife at bedside and reviewed medication and plan. Pt wants rehab for etoh pt family supportive and would like to be notified once awake an when [place] is available.

## 2018-02-11 NOTE — ED PROVIDER NOTES
Patient Seen in: [facility] Emergency Department    History   Patient presents with:  Eval-P (psychiatric)    Stated Complaint:     HPI    29-year-old male brought in by [place] police for intoxication and suicidal comments.     Police state that reviewed. All other systems reviewed and negative except as noted above.     Physical Exam   ED Triage Vitals [02/11/18 1413]  BP: (!) 152/109  Pulse: 90  Resp: 18  Temp: 97.6 °F (36.4 °C)  Temp src: Temporal  SpO2: 96 %  O2 Device: None (Room air) Please view results for these tests on the individual orders.    RAINBOW DRAW BLUE   RAINBOW DRAW LAVENDER   RAINBOW DRAW LIGHT GREEN   RAINBOW DRAW GOLD   KRISTOFER W/ DIFFERENTIAL       ED Course as of Feb 11 2005  -----------------------------------------

## 2018-02-11 NOTE — ED NOTES
Pt is yelling and making accusations about MD status in this country and need to be deported. Pt threatening to leave and push staff away. Security at bedside.

## 2018-02-11 NOTE — ED NOTES
Patient stated that the \"nuns and catholics\" are talking to loud and that they destroyed the world

## 2018-02-11 NOTE — ED NOTES
All of the patients belongings are removed  They are bagged/labeled and put into 1 large locked clothing bag  Bag #[id number]  In the bag includes   #boots   #socks   #shirt   #jacket   #hat   #pants   #shorts   #underwear   #cell phone (in the coat pocket)
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
## 2018-02-11 NOTE — ED NOTES
Patient called pct into the room  The patient asked for his clothes because he was going to leave  Explained that the patient couldn't leave due to his alcohol level  The patient started to pull off his medical equipment  RN was called into the room  The emani

## 2018-02-12 NOTE — ED NOTES
Writer went to meet with pt. Pt calm and cooperative but guarded. Pt reports multiple stressors. Pt reports drinking etoh regularly. Pt reports current withdrawal sx of tremors. Pt denied SI/HI.  Psychiatrist  met with pt and pt reports SI x2 weeks

## 2018-02-12 NOTE — ED NOTES
No beds: [facility], [facility] [address] [facility], [facility], [facility], [facility], or [facility] [employer] [facility] [employer]

## 2018-02-12 NOTE — ED NOTES
Pt woke up requesting something to make him less anxious. Pts tremors are back when while resting still.

## 2018-02-12 NOTE — BH LEVEL OF CARE ASSESSMENT
Level of Care Assessment Note    General Questions  Why are you here?: Pt states he told his older son not to take his younger son to a hotel and he did and then things escalated and might have made some statements while he was intoxicated about police alicia Suicide Rehearsal: No  Past Suicide Attempt: No  Past Suicide Risk Mitigating Factors: family  Past Suicide Risk Collateral Provided By[de-identified] per wife David [place]    Danger to Others/Property  Current/Recent Harm Toward Others: No  Current/Recent Harm Toward Othe No  Present Self-Injurious Behaviors: No    Mental Health Symptoms  Hallucination Type: No problems reported or observed  Delusions: No problems reported or observed  Depression Symptoms: Appetite change; Change in energy level;Feelings of helplessness; Feel recovery?: Yes  Describe: pt states his wife is supportive     Withdrawal Symptoms  History of Withdrawal Symptoms: Altered sleep pattern (comment); Anxiety;Diarrhea;Headaches;Irritability; Impaired attention/memory;Nausea; Other withdrawal symptoms (comment) of Speech: Appropriate  Intensity of Volume: Soft  Clarity: Mumbled;Clear  Cognition  Concentration: Unimpaired  Memory: Recent memory intact; Remote memory intact  Orientation Level: Oriented X4  Insight: Poor  Fair/poor insight as evidenced by: pt continu risk  Behavioral Precautions: Suicide  Medical Precautions: None;Seizure; Fall  Refused Treatment: No    Primary Psychiatric Diagnosis  Substance Related and Addictive Disorders: Alcohol-Related Disorder - Alcohol Withdrawal  Primary Alcohol Use Disorder: S
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
## 2018-02-12 NOTE — ED NOTES
[facility] assessment in progress.  Misael Mcneill from [facility] at [facility]

## 2018-02-12 NOTE — ED NOTES
Pt asleep on cart, arouses easily to verbal and tactile stimuli.  Pt remains calm and cooperative at this time

## 2018-02-12 NOTE — ED NOTES
Vitals are done  Patient continues to sleep on the stretcher  4 point leather restraints are still applied

## 2018-02-12 NOTE — ED NOTES
Information faxed to [facility] AND [facility] and [facility]. Be advised, these facilities call for RN to RN before acceptance. Called [facility] and they are aware. If these 2 places deflect, pt will come to [facility]. Will call ER with updates when more is known.

## 2018-02-12 NOTE — PROGRESS NOTES
[facility] Medical Screening    General Questions (RRC RN Triage)  Belongings Secured: No  Where is patient arriving from?: [facility]  Source of Information: Patient  Why are you here?: Pt states he told his older son not to take his younger son Head: none present  Agitation: normal activity  Orientation and Clouding of Sensorium: oriented and can do serial additions  CIWA-Ar Total: 7         Disposition  Acuity Level: Urgent (needs hospital-based program)  Refused Assessment: No  Screening Summar

## 2018-02-12 NOTE — BH PROGRESS NOTE
No beds/cannot accommodate at [facility], [address] [facility], [address], [address] [facility], [facility], and [place]   Awaiting response from [facility] and [facility]  Left voicemail for [facility] and [facility]

## 2018-02-12 NOTE — BH PROGRESS NOTE
Faxed packet to [facility], [facility], and [facility]   Left voicemail with [employer] is Full

## 2018-02-13 PROBLEM — I16.0 HYPERTENSIVE URGENCY: Status: ACTIVE | Noted: 2018-02-13

## 2018-02-13 NOTE — H&P
[facility]  History and Physical     Queenie Corona Patient Status:  Emergency    1970 MRN [id number]   Location [address] [facility] Attending No att. providers found   Hosp Day # 0 PCP Eloy James DO     Chief Compl greater than 150. Disp: 1 vial Rfl: 3   HydrOXYzine HCl 50 MG Oral Tab Take 1 tablet (50 mg total) by mouth 3 (three) times daily as needed for Anxiety.  Disp: 30 tablet Rfl: 0   levetiracetam 750 MG Oral Tab TAKE 1 TABLET BY MOUTH 2 TIMES DAILY Disp: 60 ta mouth daily. Disp: 30 tablet Rfl: 0       Review of Systems:   A comprehensive 12 point review of systems was completed. Pertinent positives and negatives noted in the HPI.     Physical Exam:    /99   Pulse 105   Temp 97.6 °F (36.4 °C) (Temporal) hctz, bb, acei- added prn labetalol-most of this is probably related to his withdrawal   4. Hx seizures-resume keppra  5. suicideal ideation-psych consult; suicide precautions-hold off on zoloft, per dr. Brittney Andrea er note  6. Hypokalemia-per protocol  7.  [place]

## 2018-02-13 NOTE — ED PROVIDER NOTES
It was brought to my attention by nursing staff at 7:45 PM that patient has been persistently hypertensive. The patient had had a psychiatric consultation. He was found to have severe depressive disorder and anxiety disorder with alcohol withdrawals.   Trisha Taylor

## 2018-02-13 NOTE — PROGRESS NOTES
NURSING ADMISSION NOTE      Patient admitted via Cart  Oriented to room. Safety precautions initiated. Bed in low position. Call light in reach. Completed admission assessment with patient and pt was cooperative. Pt is out of network.  Sitter at b

## 2018-02-13 NOTE — ED NOTES
No call received from [facility], Dr Kevin Bourne informed of this.  Also called GL[facility], spoke to Grace Mosquera and updated him that pt is going to the floor Room 4985

## 2018-02-13 NOTE — ED NOTES
Received a call from nurse, Ron Walters at [facility] asking for a nurse to nurse report which was given to her at length. Per Ron Walters, \"I will call you again in 10 mins if we're taking him. \"

## 2018-02-13 NOTE — ED NOTES
Shruthi MARTINEZ from [facility] updated on pt status. Will call back if MD is willing to accept pt.

## 2018-02-13 NOTE — PLAN OF CARE
Risk for Violence-Violent Restraints/Seclusion    • Patient will not express any violent or self-destructive behaviors Progressing          Pt is a/o x4. On room air. NSR on tele  BP elevated this AM. PRN labetalol given with improvement noted.   Denies aiden

## 2018-02-13 NOTE — ED NOTES
Chayo Ramsey to follow up on possible admission, spoke to Bryan Mar nurse is reviewing his papers right now and will call you in 5 mins. \"

## 2018-02-13 NOTE — ED NOTES
Received a call from [facility], spoke to Zeyad, asking for update on pt.  Pt update given per request. Per Zeyad, \"he has a bed that we placed on hold because we want him to be more stable with his BP and tremors, he can have that bed

## 2018-02-13 NOTE — ED NOTES
Report called to [facility]. Pt updated of status.  He remains calm, cooperative

## 2018-02-13 NOTE — ED NOTES
Bedside report given to [facility]. Wife at bedside. Pt given food tray updated on plan.

## 2018-02-13 NOTE — ED NOTES
Assumed care, pt sitting on side of bed, eating dinner. States he feels better after Ativan given.  Family at bedside

## 2018-02-14 ENCOUNTER — PATIENT OUTREACH (OUTPATIENT)
Dept: CASE MANAGEMENT | Age: 48
End: 2018-02-14

## 2018-02-14 NOTE — DISCHARGE SUMMARY
[facility] HOSPITALIST  DISCHARGE SUMMARY     Randa Carlisle Patient Status:  Observation    1970 MRN [id number]   [facility] 3NE-A Attending No att. providers found   Hosp Day # 0 PCP Le Eugene DO     Date of Admission: 2018 admitted to the medical floor for treatment of HTN urgency and alcohol detox. Brief Synopsis:   Per patient's report, he went to the hotel to talk to his 22 yo old son with heroin use disorder who was visiting a friend there.  His son was stealing from medications      Instructions Prescription details   Dicyclomine HCl 10 MG Caps  Commonly known as:  BENTYL      Take 1 capsule (10 mg total) by mouth 3 (three) times daily as needed.    Quantity:  30 capsule  Refills:  1     folic acid 1 MG Tabs  Commonly hypoglycemia or as ordered by physician   Quantity:  100 strip  Refills:  2     Sertraline HCl 50 MG Tabs  Commonly known as:  ZOLOFT      Take 3 tablets (150 mg total) by mouth daily.    Quantity:  45 tablet  Refills:  0     Thiamine HCl 100 MG Tabs      T

## 2018-02-14 NOTE — PROGRESS NOTES
Pt wanting to leave AMA. States he is just sitting here and has things he needs to get done. Dr Jose Ramon Woods called and aware that pt wants to leave. IV d/c. Pt signed AMA papers and left with family members.

## 2018-02-14 NOTE — PAYOR COMM NOTE
--------------  ADMISSION REVIEW     Payor: [address] #:  [id number]  Authorization Number: N/A    Admit date: N/A  Admit time: N/A       Admitting Physician: Darron Couch MD  Attending Physician:  No att. providers found  Primary PLATELET.   Procedure                               Abnormality         Status                     ---------                               -----------         ------                     CBC W/ DIFFERENTIAL[[id number]]                              Final resul uncontrolled HTN; no  available now for transfer to OSH for med mgt, per ER MD.      1. DMII-insulin ordered-monitor sugars  2. Uncontrolled HTN-resume norvasc, hctz, bb, acei- added prn labetalol  3. Hx seizures-resume keppra  4.  Hypokalemia-p

## 2018-02-27 NOTE — PROGRESS NOTES
Multiple attempts to reach pt and messages left with no return call. Past TCM timeframe. Encounter closing.

## 2018-03-16 ENCOUNTER — APPOINTMENT (OUTPATIENT)
Dept: CT IMAGING | Facility: HOSPITAL | Age: 48
End: 2018-03-16
Attending: EMERGENCY MEDICINE
Payer: MEDICAID

## 2018-03-16 ENCOUNTER — HOSPITAL ENCOUNTER (OUTPATIENT)
Facility: HOSPITAL | Age: 48
Setting detail: OBSERVATION
Discharge: HOME | End: 2018-03-18
Attending: EMERGENCY MEDICINE | Admitting: HOSPITALIST
Payer: MEDICAID

## 2018-03-16 DIAGNOSIS — K85.90 ACUTE PANCREATITIS, UNSPECIFIED COMPLICATION STATUS, UNSPECIFIED PANCREATITIS TYPE: Primary | ICD-10-CM

## 2018-03-16 LAB
ALBUMIN SERPL-MCNC: 3 G/DL (ref 3.5–4.8)
ALP LIVER SERPL-CCNC: 131 U/L (ref 45–117)
ALT SERPL-CCNC: 29 U/L (ref 17–63)
AST SERPL-CCNC: 52 U/L (ref 15–41)
BASOPHILS # BLD AUTO: 0.04 X10(3) UL (ref 0–0.1)
BASOPHILS NFR BLD AUTO: 0.6 %
BILIRUB SERPL-MCNC: 0.4 MG/DL (ref 0.1–2)
BILIRUB UR QL STRIP.AUTO: NEGATIVE
BUN BLD-MCNC: 6 MG/DL (ref 8–20)
CALCIUM BLD-MCNC: 8.8 MG/DL (ref 8.3–10.3)
CHLORIDE: 99 MMOL/L (ref 101–111)
CLARITY UR REFRACT.AUTO: CLEAR
CO2: 29 MMOL/L (ref 22–32)
CREAT BLD-MCNC: 0.76 MG/DL (ref 0.7–1.3)
EOSINOPHIL # BLD AUTO: 0.11 X10(3) UL (ref 0–0.3)
EOSINOPHIL NFR BLD AUTO: 1.7 %
ERYTHROCYTE [DISTWIDTH] IN BLOOD BY AUTOMATED COUNT: 13.2 % (ref 11.5–16)
ETHYL ALCOHOL: 96 MG/DL (ref ?–3)
GLUCOSE BLD-MCNC: 194 MG/DL (ref 70–99)
GLUCOSE UR STRIP.AUTO-MCNC: >=500 MG/DL
HCT VFR BLD AUTO: 35.6 % (ref 37–53)
HGB BLD-MCNC: 11.9 G/DL (ref 13–17)
IMMATURE GRANULOCYTE COUNT: 0.02 X10(3) UL (ref 0–1)
IMMATURE GRANULOCYTE RATIO %: 0.3 %
KETONES UR STRIP.AUTO-MCNC: NEGATIVE MG/DL
LEUKOCYTE ESTERASE UR QL STRIP.AUTO: NEGATIVE
LIPASE: 536 U/L (ref 73–393)
LYMPHOCYTES # BLD AUTO: 2.67 X10(3) UL (ref 0.9–4)
LYMPHOCYTES NFR BLD AUTO: 41.3 %
M PROTEIN MFR SERPL ELPH: 7.5 G/DL (ref 6.1–8.3)
MCH RBC QN AUTO: 28.3 PG (ref 27–33.2)
MCHC RBC AUTO-ENTMCNC: 33.4 G/DL (ref 31–37)
MCV RBC AUTO: 84.8 FL (ref 80–99)
MONOCYTES # BLD AUTO: 0.56 X10(3) UL (ref 0.1–1)
MONOCYTES NFR BLD AUTO: 8.7 %
NEUTROPHIL ABS PRELIM: 3.06 X10 (3) UL (ref 1.3–6.7)
NEUTROPHILS # BLD AUTO: 3.06 X10(3) UL (ref 1.3–6.7)
NEUTROPHILS NFR BLD AUTO: 47.4 %
NITRITE UR QL STRIP.AUTO: NEGATIVE
PH UR STRIP.AUTO: 7 [PH] (ref 4.5–8)
PLATELET # BLD AUTO: 217 10(3)UL (ref 150–450)
POTASSIUM SERPL-SCNC: 4.6 MMOL/L (ref 3.6–5.1)
PROT UR STRIP.AUTO-MCNC: NEGATIVE MG/DL
RBC # BLD AUTO: 4.2 X10(6)UL (ref 4.3–5.7)
RBC UR QL AUTO: NEGATIVE
RED CELL DISTRIBUTION WIDTH-SD: 40.2 FL (ref 35.1–46.3)
SODIUM SERPL-SCNC: 135 MMOL/L (ref 136–144)
SP GR UR STRIP.AUTO: 1.01 (ref 1–1.03)
TROPONIN: <0.046 NG/ML (ref ?–0.05)
UROBILINOGEN UR STRIP.AUTO-MCNC: <2 MG/DL
WBC # BLD AUTO: 6.5 X10(3) UL (ref 4–13)

## 2018-03-16 PROCEDURE — 74177 CT ABD & PELVIS W/CONTRAST: CPT | Performed by: EMERGENCY MEDICINE

## 2018-03-16 RX ORDER — MORPHINE SULFATE 4 MG/ML
4 INJECTION, SOLUTION INTRAMUSCULAR; INTRAVENOUS ONCE
Status: COMPLETED | OUTPATIENT
Start: 2018-03-16 | End: 2018-03-16

## 2018-03-16 RX ORDER — DIPHENHYDRAMINE HYDROCHLORIDE 50 MG/ML
25 INJECTION INTRAMUSCULAR; INTRAVENOUS ONCE
Status: COMPLETED | OUTPATIENT
Start: 2018-03-16 | End: 2018-03-16

## 2018-03-16 RX ORDER — LABETALOL HYDROCHLORIDE 5 MG/ML
20 INJECTION, SOLUTION INTRAVENOUS ONCE
Status: COMPLETED | OUTPATIENT
Start: 2018-03-16 | End: 2018-03-16

## 2018-03-16 RX ORDER — ONDANSETRON 2 MG/ML
4 INJECTION INTRAMUSCULAR; INTRAVENOUS ONCE
Status: COMPLETED | OUTPATIENT
Start: 2018-03-16 | End: 2018-03-16

## 2018-03-17 PROBLEM — K85.90 ACUTE PANCREATITIS, UNSPECIFIED COMPLICATION STATUS, UNSPECIFIED PANCREATITIS TYPE: Status: ACTIVE | Noted: 2018-03-17

## 2018-03-17 LAB
ALBUMIN SERPL-MCNC: 2.9 G/DL (ref 3.5–4.8)
ALP LIVER SERPL-CCNC: 121 U/L (ref 45–117)
ALT SERPL-CCNC: 26 U/L (ref 17–63)
AST SERPL-CCNC: 23 U/L (ref 15–41)
ATRIAL RATE: 72 BPM
BASOPHILS # BLD AUTO: 0.03 X10(3) UL (ref 0–0.1)
BASOPHILS NFR BLD AUTO: 0.6 %
BILIRUB SERPL-MCNC: 0.4 MG/DL (ref 0.1–2)
BUN BLD-MCNC: 6 MG/DL (ref 8–20)
CALCIUM BLD-MCNC: 8.2 MG/DL (ref 8.3–10.3)
CHLORIDE: 101 MMOL/L (ref 101–111)
CO2: 29 MMOL/L (ref 22–32)
CREAT BLD-MCNC: 0.76 MG/DL (ref 0.7–1.3)
EOSINOPHIL # BLD AUTO: 0.09 X10(3) UL (ref 0–0.3)
EOSINOPHIL NFR BLD AUTO: 1.9 %
ERYTHROCYTE [DISTWIDTH] IN BLOOD BY AUTOMATED COUNT: 13.3 % (ref 11.5–16)
EST. AVERAGE GLUCOSE BLD GHB EST-MCNC: 174 MG/DL (ref 68–126)
GLUCOSE BLD-MCNC: 154 MG/DL (ref 65–99)
GLUCOSE BLD-MCNC: 155 MG/DL (ref 65–99)
GLUCOSE BLD-MCNC: 156 MG/DL (ref 65–99)
GLUCOSE BLD-MCNC: 169 MG/DL (ref 70–99)
GLUCOSE BLD-MCNC: 175 MG/DL (ref 65–99)
HAV IGM SER QL: 1.4 MG/DL (ref 1.7–3)
HBA1C MFR BLD HPLC: 7.7 % (ref ?–5.7)
HCT VFR BLD AUTO: 32.5 % (ref 37–53)
HGB BLD-MCNC: 10.8 G/DL (ref 13–17)
IMMATURE GRANULOCYTE COUNT: 0.01 X10(3) UL (ref 0–1)
IMMATURE GRANULOCYTE RATIO %: 0.2 %
LYMPHOCYTES # BLD AUTO: 2.25 X10(3) UL (ref 0.9–4)
LYMPHOCYTES NFR BLD AUTO: 47.8 %
M PROTEIN MFR SERPL ELPH: 6.7 G/DL (ref 6.1–8.3)
MCH RBC QN AUTO: 28.5 PG (ref 27–33.2)
MCHC RBC AUTO-ENTMCNC: 33.2 G/DL (ref 31–37)
MCV RBC AUTO: 85.8 FL (ref 80–99)
MONOCYTES # BLD AUTO: 0.47 X10(3) UL (ref 0.1–1)
MONOCYTES NFR BLD AUTO: 10 %
NEUTROPHIL ABS PRELIM: 1.86 X10 (3) UL (ref 1.3–6.7)
NEUTROPHILS # BLD AUTO: 1.86 X10(3) UL (ref 1.3–6.7)
NEUTROPHILS NFR BLD AUTO: 39.5 %
P AXIS: 49 DEGREES
P-R INTERVAL: 174 MS
PLATELET # BLD AUTO: 185 10(3)UL (ref 150–450)
POTASSIUM SERPL-SCNC: 3 MMOL/L (ref 3.6–5.1)
POTASSIUM SERPL-SCNC: 3.6 MMOL/L (ref 3.6–5.1)
Q-T INTERVAL: 422 MS
QRS DURATION: 82 MS
QTC CALCULATION (BEZET): 462 MS
R AXIS: -9 DEGREES
RBC # BLD AUTO: 3.79 X10(6)UL (ref 4.3–5.7)
RED CELL DISTRIBUTION WIDTH-SD: 41.2 FL (ref 35.1–46.3)
SODIUM SERPL-SCNC: 138 MMOL/L (ref 136–144)
T AXIS: -4 DEGREES
VENTRICULAR RATE: 72 BPM
WBC # BLD AUTO: 4.7 X10(3) UL (ref 4–13)

## 2018-03-17 PROCEDURE — 99220 INITIAL OBSERVATION CARE,LEVL III: CPT | Performed by: HOSPITALIST

## 2018-03-17 RX ORDER — LORAZEPAM 2 MG/ML
1 INJECTION INTRAMUSCULAR
Status: DISCONTINUED | OUTPATIENT
Start: 2018-03-17 | End: 2018-03-18

## 2018-03-17 RX ORDER — POTASSIUM CHLORIDE 20 MEQ/1
40 TABLET, EXTENDED RELEASE ORAL EVERY 4 HOURS
Status: COMPLETED | OUTPATIENT
Start: 2018-03-17 | End: 2018-03-18

## 2018-03-17 RX ORDER — POTASSIUM CHLORIDE 14.9 MG/ML
20 INJECTION INTRAVENOUS ONCE
Status: COMPLETED | OUTPATIENT
Start: 2018-03-17 | End: 2018-03-17

## 2018-03-17 RX ORDER — HYDROMORPHONE HYDROCHLORIDE 1 MG/ML
0.8 INJECTION, SOLUTION INTRAMUSCULAR; INTRAVENOUS; SUBCUTANEOUS EVERY 2 HOUR PRN
Status: DISCONTINUED | OUTPATIENT
Start: 2018-03-17 | End: 2018-03-18

## 2018-03-17 RX ORDER — MAGNESIUM OXIDE 400 MG (241.3 MG MAGNESIUM) TABLET
800 TABLET ONCE
Status: DISCONTINUED | OUTPATIENT
Start: 2018-03-17 | End: 2018-03-17

## 2018-03-17 RX ORDER — ONDANSETRON 2 MG/ML
4 INJECTION INTRAMUSCULAR; INTRAVENOUS EVERY 6 HOURS PRN
Status: DISCONTINUED | OUTPATIENT
Start: 2018-03-17 | End: 2018-03-18

## 2018-03-17 RX ORDER — HYDROMORPHONE HYDROCHLORIDE 1 MG/ML
0.2 INJECTION, SOLUTION INTRAMUSCULAR; INTRAVENOUS; SUBCUTANEOUS EVERY 2 HOUR PRN
Status: DISCONTINUED | OUTPATIENT
Start: 2018-03-17 | End: 2018-03-18

## 2018-03-17 RX ORDER — ENALAPRILAT 2.5 MG/2ML
1.25 INJECTION INTRAVENOUS EVERY 8 HOURS
Status: DISCONTINUED | OUTPATIENT
Start: 2018-03-17 | End: 2018-03-17

## 2018-03-17 RX ORDER — ENALAPRIL MALEATE 10 MG/1
20 TABLET ORAL
Status: DISCONTINUED | OUTPATIENT
Start: 2018-03-18 | End: 2018-03-18

## 2018-03-17 RX ORDER — LORAZEPAM 2 MG/ML
2 INJECTION INTRAMUSCULAR
Status: DISCONTINUED | OUTPATIENT
Start: 2018-03-17 | End: 2018-03-18

## 2018-03-17 RX ORDER — HYDROMORPHONE HYDROCHLORIDE 1 MG/ML
0.4 INJECTION, SOLUTION INTRAMUSCULAR; INTRAVENOUS; SUBCUTANEOUS EVERY 2 HOUR PRN
Status: DISCONTINUED | OUTPATIENT
Start: 2018-03-17 | End: 2018-03-18

## 2018-03-17 RX ORDER — SODIUM CHLORIDE 9 MG/ML
INJECTION, SOLUTION INTRAVENOUS CONTINUOUS
Status: DISCONTINUED | OUTPATIENT
Start: 2018-03-17 | End: 2018-03-18

## 2018-03-17 RX ORDER — LORAZEPAM 1 MG/1
2 TABLET ORAL
Status: DISCONTINUED | OUTPATIENT
Start: 2018-03-17 | End: 2018-03-18

## 2018-03-17 RX ORDER — ONDANSETRON 2 MG/ML
4 INJECTION INTRAMUSCULAR; INTRAVENOUS EVERY 4 HOURS PRN
Status: DISCONTINUED | OUTPATIENT
Start: 2018-03-17 | End: 2018-03-17

## 2018-03-17 RX ORDER — ENOXAPARIN SODIUM 100 MG/ML
40 INJECTION SUBCUTANEOUS DAILY
Status: DISCONTINUED | OUTPATIENT
Start: 2018-03-17 | End: 2018-03-18

## 2018-03-17 RX ORDER — LORAZEPAM 1 MG/1
1 TABLET ORAL
Status: DISCONTINUED | OUTPATIENT
Start: 2018-03-17 | End: 2018-03-18

## 2018-03-17 RX ORDER — METOPROLOL TARTRATE 5 MG/5ML
5 INJECTION INTRAVENOUS EVERY 6 HOURS
Status: DISCONTINUED | OUTPATIENT
Start: 2018-03-17 | End: 2018-03-17

## 2018-03-17 RX ORDER — SODIUM CHLORIDE 9 MG/ML
INJECTION, SOLUTION INTRAVENOUS CONTINUOUS
Status: DISCONTINUED | OUTPATIENT
Start: 2018-03-17 | End: 2018-03-17

## 2018-03-17 RX ORDER — DIPHENHYDRAMINE HYDROCHLORIDE 50 MG/ML
25 INJECTION INTRAMUSCULAR; INTRAVENOUS EVERY 6 HOURS PRN
Status: DISCONTINUED | OUTPATIENT
Start: 2018-03-17 | End: 2018-03-18

## 2018-03-17 RX ORDER — DEXTROSE MONOHYDRATE 25 G/50ML
50 INJECTION, SOLUTION INTRAVENOUS
Status: DISCONTINUED | OUTPATIENT
Start: 2018-03-17 | End: 2018-03-18

## 2018-03-17 NOTE — ED NOTES
RN called to room to assess IV site, red itchy skin noted up R arm after IV Morphine, MD at bedside to assess pt, no MANOJ or wheezing, orders to give IV Benadryl.

## 2018-03-17 NOTE — ED INITIAL ASSESSMENT (HPI)
Pt here with LUQ pain x 2 weeks, pt has hx Pancreatitis, Pt denies vomiting, fevers or diarrhea. Pt taking OTC pain meds x 2-3 days, pt also reports drinking alcohol every day.  Last drink 1 hour ago, pt has been through alcohol withdrawal in the past.

## 2018-03-17 NOTE — H&P
[facility]  History and Physical     Connor Tavon Patient Status:  Emergency    1970 MRN [id number]   Location [address] [facility] Attending Mary Marino MD   Hosp Day # 0 PCP Gloria Lamb DO     Chief Complain Encounter:  levetiracetam 750 MG Oral Tab TAKE 1 TABLET BY MOUTH 2 TIMES DAILY Disp: 60 tablet Rfl: 1   HydrOXYzine HCl 50 MG Oral Tab Take 1 tablet (50 mg total) by mouth 3 (three) times daily as needed for Anxiety.  Disp: 60 tablet Rfl: 0   Sertraline HCl by mouth daily. Disp: 30 tablet Rfl: 0       Review of Systems:   A comprehensive 14 point review of systems was completed. Pertinent positives and negatives noted in the HPI.     Physical Exam:    BP (!) 184/122   Pulse 72   Temp 98.2 °F (36.8 °C) (Oral electrolyte  7. Diabetes mellitus  1. Correctional scale  2. Accuchecks  3. A1c  8. Essential hypertension  1. Hold oral agents  2. Enalaprilat IV  3. Metoprolol IV  4.  Monitor blood pressure and heart rate    Quality:  · DVT Prophylaxis: Lovenox    Plan o

## 2018-03-17 NOTE — PLAN OF CARE
Patient alert and oriented x4. On RA, . NSR on tele. Patient complains of 8/10 pain. PRN pain medication administered as per MAR. Clear liquid diet. Electrolytes replaced per protocol. Blood sugar maintained as per MAR. CIWA scores remain 0-2.

## 2018-03-17 NOTE — ED PROVIDER NOTES
Patient Seen in: [facility] Emergency Department    History   Patient presents with:  Abdomen/Flank Pain (GI/)    Stated Complaint: abd pain and vomiting    HPI    This is a 44-year-old male who arrives here with 2 weeks of abdominal pain, he did 2033]  BP: (!) 177/121  Pulse: 78  Resp: 16  Temp: 98.2 °F (36.8 °C)  Temp src: Oral  SpO2: 99 %  O2 Device: None (Room air)    Current:BP (!) 184/122   Pulse 72   Temp 98.2 °F (36.8 °C) (Oral)   Resp 16   Ht 168.9 cm (5' 6.5\")   Wt 68 kg   SpO2 99%   BMI within normal limits   CBC W/ DIFFERENTIAL - Abnormal; Notable for the following:     RBC 4.20 (*)     HGB 11.9 (*)     HCT 35.6 (*)     All other components within normal limits   TROPONIN I - Normal   CBC WITH DIFFERENTIAL WITH PLATELET    Narrative: Radiology) NRDR ([address]) which includes the Dose Index Registry.   PATIENT STATED HISTORY:(As transcribed by Technologist)  ABD PAIN   CONTRAST USED:  80cc of Omnipaque 350  FINDINGS:  LIVER:  Fatty infiltration of the liver with h Inflammatory changes and ascites in the left upper quadrant extending to involve the splenic flexure of colon. This may be reactive or related to colitis. Splenic and gastric varices.     Dictated by: Kamari Lancaster MD on 3/16/2018 at 22:37     Approved b

## 2018-03-17 NOTE — ED NOTES
Pt c/o redness and itching to face, head and back, additional Benadryl orders received and initiated.

## 2018-03-17 NOTE — PROGRESS NOTES
Rayna  #[id number]  (52 year old M)     3NE-A [id number]   Ivis Tai, RN Registered Nurse Signed Nursing  Progress Notes Date of Service: 3/17/2018 11:37 AM   Related encounter: Assessment from 3/17/2018 in [facility] Current/Recent Harm Toward Others Rehearsal: No  Current/Recent Harm Toward Others Threat/Attempt: No  Past Harm Toward Others: No  Current or Past Harm Toward Animals: No  History or Allegations of Inappropriate Physical Contact: No  Current/Recent Destru Last Use?: 3/16/18 around 6:00 PM  Is your current use the most/worst it has ever been? : No     Illicit and Prescription Drug Use  Which if any illicit/prescription drugs have you used/abused?: Denies                                         Support for Re Memory: Recent memory intact; Remote memory intact  Orientation Level: Oriented X4  Insight: Good  Judgment: Good  Thought Patterns  Clarity/Relevance: Coherent  Flow: Organized  Content: Ordinary  Level of Consciousness: Alert  Level of Consciousness: Aler

## 2018-03-17 NOTE — PROGRESS NOTES
NURSING ADMISSION NOTE      Patient admitted via Cart  Oriented to room. Safety precautions initiated. Bed in low position. Call light in reach. Admission navigator completed.   Patient A&O x 4  CIWA protocol  Seizure precautions in place  IVF inf

## 2018-03-18 VITALS
OXYGEN SATURATION: 100 % | DIASTOLIC BLOOD PRESSURE: 100 MMHG | SYSTOLIC BLOOD PRESSURE: 155 MMHG | WEIGHT: 150 LBS | RESPIRATION RATE: 18 BRPM | TEMPERATURE: 98 F | HEART RATE: 66 BPM | HEIGHT: 66.5 IN | BODY MASS INDEX: 23.82 KG/M2

## 2018-03-18 LAB
GLUCOSE BLD-MCNC: 100 MG/DL (ref 65–99)
GLUCOSE BLD-MCNC: 158 MG/DL (ref 65–99)
HAV IGM SER QL: 1.9 MG/DL (ref 1.7–3)
POTASSIUM SERPL-SCNC: 4.3 MMOL/L (ref 3.6–5.1)

## 2018-03-18 PROCEDURE — 99225 SUBSEQUENT OBSERVATION CARE: CPT | Performed by: HOSPITALIST

## 2018-03-18 RX ORDER — HYDRALAZINE HYDROCHLORIDE 20 MG/ML
10 INJECTION INTRAMUSCULAR; INTRAVENOUS
Status: DISCONTINUED | OUTPATIENT
Start: 2018-03-18 | End: 2018-03-18

## 2018-03-18 RX ORDER — CALCIUM CARBONATE 200(500)MG
500 TABLET,CHEWABLE ORAL
Status: DISCONTINUED | OUTPATIENT
Start: 2018-03-18 | End: 2018-03-18

## 2018-03-18 RX ORDER — HYDRALAZINE HYDROCHLORIDE 20 MG/ML
10 INJECTION INTRAMUSCULAR; INTRAVENOUS ONCE AS NEEDED
Status: COMPLETED | OUTPATIENT
Start: 2018-03-18 | End: 2018-03-18

## 2018-03-18 RX ORDER — HYDROXYZINE HYDROCHLORIDE 25 MG/1
50 TABLET, FILM COATED ORAL 3 TIMES DAILY PRN
Status: DISCONTINUED | OUTPATIENT
Start: 2018-03-18 | End: 2018-03-18

## 2018-03-18 RX ORDER — TRAMADOL HYDROCHLORIDE 50 MG/1
50 TABLET ORAL EVERY 6 HOURS PRN
Status: DISCONTINUED | OUTPATIENT
Start: 2018-03-18 | End: 2018-03-18

## 2018-03-18 RX ORDER — MAGNESIUM HYDROXIDE/ALUMINUM HYDROXICE/SIMETHICONE 120; 1200; 1200 MG/30ML; MG/30ML; MG/30ML
30 SUSPENSION ORAL 4 TIMES DAILY PRN
Status: DISCONTINUED | OUTPATIENT
Start: 2018-03-18 | End: 2018-03-18

## 2018-03-18 RX ORDER — HYDROCHLOROTHIAZIDE 25 MG/1
25 TABLET ORAL DAILY
Status: DISCONTINUED | OUTPATIENT
Start: 2018-03-18 | End: 2018-03-18

## 2018-03-18 NOTE — PLAN OF CARE
NURSING DISCHARGE NOTE    Discharged Home via Wheelchair. Accompanied by Support staff  Belongings Taken by patient/family. Patient given discharge paperwork. Verbalizes understanding.

## 2018-03-18 NOTE — PROGRESS NOTES
[facility]  Progress Note     Lyle Krysta Patient Status:  Observation    1970 MRN [id number]   [facility] 3NE-A Attending Juanita Watt MD   Hosp Day # 0 PCP Arnold Plummer DO     Chief Complaint: pancreatitis     S: Imaging: Imaging data reviewed in Epic.     Medications:   • Insulin Aspart Pen  2-10 Units Subcutaneous TID CC and HS   • hydrochlorothiazide  25 mg Oral Daily   • levetiracetam  750 mg Oral BID   • enoxaparin  40 mg Subcutaneous Daily   • multi

## 2018-03-18 NOTE — DISCHARGE SUMMARY
[facility] [place] HOSPITALIST  DISCHARGE SUMMARY     Rocco Gale Patient Status:  Observation    1970 MRN [id number]   [facility] 3NE-A Attending Coby Banerjee MD   Hosp Day # 0 PCP Ellen Cool DO     Date of Admission: 3/16/2018  Rodríguez Synopsis: patient was admitted and started on IV hydration, given pain medication and kept NPO. Abdominal pain quickly improved. Patient was told of the potentially life threatening consequences of continued EtOH abuse, given his CT scan findings.  However HCl 50 MG Tabs  Commonly known as:  ATARAX      Take 1 tablet (50 mg total) by mouth 3 (three) times daily as needed for Anxiety.    Quantity:  60 tablet  Refills:  0     insulin glargine 100 UNIT/ML Soln  Commonly known as:  LANTUS      Inject 6 Units into appointment:   No follow-up provider specified. Vital signs:  Temp:  [97.8 °F (36.6 °C)-98.3 °F (36.8 °C)] 98.1 °F (36.7 °C)  Pulse:  [64-74] 66  Resp:  [18-19] 18  BP: (126-189)/() 155/100    Physical Exam:    General: No acute distress.    Respir

## 2018-03-19 ENCOUNTER — PATIENT OUTREACH (OUTPATIENT)
Dept: CASE MANAGEMENT | Age: 48
End: 2018-03-19

## 2018-03-19 DIAGNOSIS — Z02.9 ENCOUNTERS FOR ADMINISTRATIVE PURPOSE: ICD-10-CM

## 2018-03-20 ENCOUNTER — OFFICE VISIT (OUTPATIENT)
Dept: FAMILY MEDICINE CLINIC | Facility: CLINIC | Age: 48
End: 2018-03-20

## 2018-03-20 VITALS
HEART RATE: 78 BPM | OXYGEN SATURATION: 97 % | RESPIRATION RATE: 16 BRPM | SYSTOLIC BLOOD PRESSURE: 144 MMHG | WEIGHT: 155 LBS | DIASTOLIC BLOOD PRESSURE: 100 MMHG | TEMPERATURE: 98 F | BODY MASS INDEX: 25 KG/M2

## 2018-03-20 DIAGNOSIS — E83.42 HYPOMAGNESEMIA: ICD-10-CM

## 2018-03-20 DIAGNOSIS — R56.9 SEIZURE (HCC): ICD-10-CM

## 2018-03-20 DIAGNOSIS — I10 ESSENTIAL HYPERTENSION: ICD-10-CM

## 2018-03-20 DIAGNOSIS — K70.30 ALCOHOLIC CIRRHOSIS OF LIVER WITHOUT ASCITES (HCC): ICD-10-CM

## 2018-03-20 DIAGNOSIS — K85.20 ALCOHOL-INDUCED ACUTE PANCREATITIS, UNSPECIFIED COMPLICATION STATUS: ICD-10-CM

## 2018-03-20 DIAGNOSIS — F32.A DEPRESSIVE DISORDER: ICD-10-CM

## 2018-03-20 DIAGNOSIS — F10.20 ALCOHOLIC (HCC): ICD-10-CM

## 2018-03-20 DIAGNOSIS — F41.9 ANXIETY: ICD-10-CM

## 2018-03-20 DIAGNOSIS — Z00.00 LABORATORY EXAM ORDERED AS PART OF ROUTINE GENERAL MEDICAL EXAMINATION: ICD-10-CM

## 2018-03-20 DIAGNOSIS — Z09 HOSPITAL DISCHARGE FOLLOW-UP: Primary | ICD-10-CM

## 2018-03-20 DIAGNOSIS — D69.6 THROMBOCYTOPENIA (HCC): ICD-10-CM

## 2018-03-20 DIAGNOSIS — D53.9 ANEMIA ASSOCIATED WITH NUTRITIONAL DEFICIENCY: ICD-10-CM

## 2018-03-20 DIAGNOSIS — E11.65 TYPE 2 DIABETES MELLITUS WITH HYPERGLYCEMIA, UNSPECIFIED WHETHER LONG TERM INSULIN USE (HCC): ICD-10-CM

## 2018-03-20 PROCEDURE — 99215 OFFICE O/P EST HI 40 MIN: CPT | Performed by: FAMILY MEDICINE

## 2018-03-20 RX ORDER — AMLODIPINE BESYLATE 10 MG/1
10 TABLET ORAL DAILY
Qty: 30 TABLET | Refills: 5 | Status: ON HOLD | OUTPATIENT
Start: 2018-03-20 | End: 2018-05-29

## 2018-03-20 RX ORDER — FOLIC ACID 1 MG/1
1 TABLET ORAL DAILY
Qty: 30 TABLET | Refills: 2 | Status: SHIPPED | OUTPATIENT
Start: 2018-03-20 | End: 2018-01-01

## 2018-03-20 NOTE — PROGRESS NOTES
HPI:    Harriet Clarke is a 57year old male here today for hospital follow up.    He was discharged from Inpatient hospital, [facility] to Home   Admission Date: 3/16/18   Discharge Date: 3/18/18  Hospital Discharge Diagnoses (since 2/18/2018) dealing with pancreatitis and has returned to drinking after 32 days being sober. Patient states that the 32 days with the best days of his life. He states that he is not ready to quit drinking.   Because he is not ready, he feels that going to meetings w physician   Glucose Blood (ONETOUCH ULTRA BLUE) In Vitro Strip Test blood glucose 4-5 times per day before meals, bedtime and for signs, symptoms of hypoglycemia or as ordered by physician   Blood Glucose Monitoring Suppl (ONETOUCH ULTRA 2) w/Device Does n denies pain, normal range of motion of extremities  NEURO: denies headaches, denies dizziness, denies weakness  PSYCHE: denies depression or anxiety  HEMATOLOGIC: denies hx of anemia, or bruising, denies bleeding  ENDOCRINE: denies thyroid history  ALL/AST COMP METABOLIC PANEL (14); Future  -     LIPID PANEL; Future  -     VITAMIN D, 25-HYDROXY; Future  -     VITAMIN B12; Future  -     VITAMIN B1 (THIAMINE), BLOOD; Future  -     HEMOGLOBIN A1C; Future  -     TSH+FREE T4; Future  -     MAGNESIUM;  Future and/or Management Options: severe  · Amount and/or Complexity of Data to Be Reviewed: severe  · Risk of Significant Complications, Morbidity, and/or Mortality: severe    Overall Risk:   severe   Patient does have a very high risk of readmission.   Patient c

## 2018-03-20 NOTE — PROGRESS NOTES
Initial Post Discharge Follow Up   Discharge Date: 3/18/18  Contact Date: 3/20/2018    Consent Verification:  Assessment Completed With: Patient  HIPAA Verified?   Yes    Discharge Dx:   Acute pancreatitis with possible pseudocyst formation due to EtOh Tab Take 3 tablets (150 mg total) by mouth daily. Disp: 45 tablet Rfl: 0   Metoprolol Tartrate 50 MG Oral Tab Take 1 tablet (50 mg total) by mouth 2 (two) times daily.  Disp: 60 tablet Rfl: 0   hydrochlorothiazide 25 MG Oral Tab Take 1 tablet (25 mg total) are not missing anything?no- pt stated the medications are the same as before. • Are there any reasons that keep you from taking your medication as prescribed? No  Are you having any concerns with constipation?  No    Referrals/orders at D/C:  Home Health contact the office with any further questions or concerns. [x]  Discharge Summary, Discharge medications reviewed/discussed/and reconciled with patient, and orders reviewed and discussed. Any changes or updates to medications and or orders sent to PCP.

## 2018-03-20 NOTE — PAYOR COMM NOTE
--------------  ADMISSION REVIEW     Payor: MEDICAID  Subscriber #:  [id number]  Authorization Number: N/A    Admit date: N/A  Admit time: N/A       Admitting Physician: Kathryn Trinh MD  Attending Physician:  No att. providers found  [id number] components within normal limits   LIPASE - Abnormal; Notable for the following:     Lipase 536 (*)     All other components within normal limits   CBC W/ DIFFERENTIAL - Abnormal; Notable for the following:     RBC 4.20 (*)     HGB 11.9 (*)     HCT 35.6 (*) Impression:[SM.1]  Acute pancreatitis, unspecified complication status, unspecified pancreatitis type  (primary encounter diagnosis)  Hypertension[SM. 2]        ASSESSMENT / PLAN:[PT.1]     1. Acute pancreatitis with possible pseudocyst formation   2.  Possi

## 2018-03-24 ENCOUNTER — APPOINTMENT (OUTPATIENT)
Dept: GENERAL RADIOLOGY | Facility: HOSPITAL | Age: 48
DRG: 799 | End: 2018-03-24
Attending: EMERGENCY MEDICINE
Payer: MEDICAID

## 2018-03-24 PROCEDURE — 71045 X-RAY EXAM CHEST 1 VIEW: CPT | Performed by: EMERGENCY MEDICINE

## 2018-03-24 NOTE — ED NOTES
Unable to obtain venous access. 2 nurses and 1 PCT attempted with 2 attempts for each with no success.

## 2018-03-24 NOTE — ED INITIAL ASSESSMENT (HPI)
Discharged from hospital recently for pancreatitis.   Back today with persistent abd pain more on mid and L side

## 2018-03-24 NOTE — ED PROVIDER NOTES
Patient Seen in: [facility] Emergency Department    History   Patient presents with:  Abdomen/Flank Pain (GI/)    Stated Complaint: abd pain    HPI    53-year-old male with history of alcohol abuse and continues to drink today's comes the hospital regular rate and rhythm  Lungs: Clear to auscultation bilaterally  Abdomen: Soft epigastric region is tender nondistended normal active bowel sounds without rebound, guarding or masses noted  Back nontender without CVA tenderness  Extremity no clubbing, cy alcohol and I believe is having another bout of pancreatitis at this point the patient will be admitted to the hospital for continued pain management and IV fluids as well.   ED Course as of Mar 24 2009  -----------------------------------------------------

## 2018-03-25 ENCOUNTER — APPOINTMENT (OUTPATIENT)
Dept: GENERAL RADIOLOGY | Facility: HOSPITAL | Age: 48
DRG: 799 | End: 2018-03-25
Attending: NURSE PRACTITIONER
Payer: MEDICAID

## 2018-03-25 ENCOUNTER — APPOINTMENT (OUTPATIENT)
Dept: CT IMAGING | Facility: HOSPITAL | Age: 48
DRG: 799 | End: 2018-03-25
Attending: NURSE PRACTITIONER
Payer: MEDICAID

## 2018-03-25 ENCOUNTER — SURGERY (OUTPATIENT)
Age: 48
End: 2018-03-25

## 2018-03-25 PROCEDURE — 74176 CT ABD & PELVIS W/O CONTRAST: CPT | Performed by: NURSE PRACTITIONER

## 2018-03-25 PROCEDURE — 74018 RADEX ABDOMEN 1 VIEW: CPT | Performed by: NURSE PRACTITIONER

## 2018-03-25 PROCEDURE — 71045 X-RAY EXAM CHEST 1 VIEW: CPT | Performed by: NURSE PRACTITIONER

## 2018-03-25 NOTE — ED NOTES
Additional dose of morphine given . Will monitor for worsening allergic reaction symptoms. Report given to Nusrat MARTINEZ for 3453.  Transport placed

## 2018-03-25 NOTE — ED NOTES
Slight red streaking noted at IV site after morphine. Streaking minimal and patient denies any other symptoms. Per Dr Sean crisostomo to continue administering morphine.

## 2018-03-25 NOTE — PROGRESS NOTES
[facility]  Progress Note     Meggan Janeomayra Patient Status:  Inpatient    1970 MRN [id number]   [facility] 3NE-A Attending Ashanti Olvera MD   Hosp Day # 1 PCP Viri Sebastian DO     Chief Complaint: abdominal pain    S: Pa Blocker)   • Enalapril Maleate  20 mg Oral Daily   • folic acid  1 mg Oral Daily   • Insulin Aspart Pen  1-68 Units Subcutaneous TID CC   • Insulin Aspart Pen  1-10 Units Subcutaneous TID AC and HS   • levetiracetam  750 mg Oral BID   • insulin detemir  6

## 2018-03-25 NOTE — CONSULTS
[address] [place] [address] Patient Status:  Inpatient    1970 MRN [id number]   [facility] 3NE-A Attending Caro John MD   [facility] Day # 1 PCP Zuleika Weiner DO     Date of Admission: 3/24/2018  Admission Diagnosis: Alcohol Disorder Mother          Home Medications:    Outpatient Prescriptions Marked as Taking for the 3/24/18 encounter [facility] Encounter):  insulin glargine 100 UNIT/ML Subcutaneous Solution Inject 6 Units into the skin 2 (two) times daily.  Disp:  Rfl:    dewey tablet, 4 tablet, Oral, Q15 Min PRN **OR** dextrose 50% injection 50 mL, 50 mL, Intravenous, Q15 Min PRN **OR** glucose (DEX4) oral liquid 30 g, 30 g, Oral, Q15 Min PRN **OR** Glucose-Vitamin C (DEX-4) 4-0.006 g chewable tab 8 tablet, 8 tablet, Oral, Q15 M adenopathy, no carotid bruit, no JVD and supple, symmetrical, trachea midline  Back: symmetric, no curvature. ROM normal. No CVA tenderness.   Lungs: clear to auscultation bilaterally  Chest wall: no tenderness  Heart: S1, S2 normal, no murmur, click, rub o tolerated  3. ETOH withdrawal- CIWA per protocol  -not severe; ETOH level was very high <24hrs ago. Doubt he has begun to withdraw significantly  -precedex if becomes severe  -avoid over sedation  4.  Acute on chronic pancreatitis- analgesic support, hydrat

## 2018-03-25 NOTE — PROGRESS NOTES
[facility]  RAPID RESPONSE NOTE     Deysi Sandra Patient Status:  Inpatient    1970 MRN [id number]   [facility] 3NE-A Attending Yenni Langford MD   Hosp Day # 1 PCP Renée Grayson DO     Reason for RRT: unresponsive, hypo

## 2018-03-25 NOTE — ED NOTES
Report received from [facility]. Pt reports no relief from toradol. RN inquired about allergy to morphine, pt reports redness up his arm the last time he had it. Denies itching or MANOJ associated with that.  Dr Trice Beaver aware, no further orders received at t

## 2018-03-25 NOTE — ED NOTES
No worsening reaction to 2nd dose of morphine. Pt reports pain 4/10 at present time.  Pt up to floor

## 2018-03-25 NOTE — ED NOTES
Pt medicated with morphine and benadryl as ordered.  Will monitor for any signs of allergic reaction

## 2018-03-25 NOTE — BH LEVEL OF CARE ASSESSMENT
Level of Care Assessment Note    General Questions  Why are you here?: \"Pancreatitis flare up really bad. \"  Precipitating Events: Pt states that he was just at [facility] for abdominal pain about a week ago then was discharged home and is back at the [facility] with his son who lives with him. Pt states he is an alcoholic and his son abuses heroine and the two don't mix well. Pt states that things have recently started to get a little better between the two of them.   Current/Recent Harm Toward Others Ideation: No his son. )  Panic Attacks: Pt states that he will get panic attacks every now and then when he gets very upset with his son,   Trauma Reaction:  (Pt denies)  Bipolar Symptoms: No problems reported or observed  Sleep Pattern: Disturbed/interrupted sleep  Nu environment a supportive place for recovery?: Yes  Describe: Pt states his wife is very supportive of him. Withdrawal Symptoms  History of Withdrawal Symptoms: Vomiting;Nausea; Diarrhea;Seizures; Auditory hallucinations  Last Withdrawal Episode: 1 week Clear  Cognition  Concentration: Unimpaired  Memory: Recent memory intact; Remote memory intact  Orientation Level: Oriented X4  Insight: Fair  Fair/poor insight as evidenced by: Pt states he would like to look for some outpatient program that he could go t use  Outpatient Recommendations: Therapy; Self-help group; Medication management  Referral 1: [facility] [address]  Cesar Garvin  [phone]  Referral 2: [address] [facility] / [address] [phone]  [place]

## 2018-03-25 NOTE — H&P
[facility]  History and Physical     Binu Jones Patient Status:  Emergency    1970 MRN [id number]   Location [address] [facility] Attending Leydi Morris MD   Hosp Day # 0 PCP Favian Allen DO     Chief Complaint facility-administered medications on file prior to encounter. Current Outpatient Prescriptions on File Prior to Encounter:  folic acid 1 MG Oral Tab Take 1 tablet (1 mg total) by mouth daily.  Disp: 30 tablet Rfl: 2   AmLODIPine Besylate 10 MG Oral Tab Ta ordered by physician Disp: 1 kit Rfl: 0   Insulin Pen Needle (BD AUTOSHIELD DUO) 30G X 5 MM Does not apply Misc Use a new pen needle with each injection as directed by your doctor Disp: 100 each Rfl: 6   Thiamine HCl 100 MG Oral Tab Take 1 tablet (100 mg t imaging reviewed  1. NPO, IVF, IV analgesics- (he tolerates morphine with benadryl)  2. If not improving in next 24-48 hours, consider repeat imaging and GI evaluation  2. ETOH dependence- continues to drink.  Last drink just prior to coming in tho the ED

## 2018-03-25 NOTE — PROCEDURES
[address] [place] [address] Patient Status:  Inpatient    1970 MRN [id number]   [facility] 4SW-A Attending Vipul Sprague MD   Hosp Day # 1 PCP Arnold Plummer DO   Procedure Note - Central line     Procedure:       Central li

## 2018-03-26 ENCOUNTER — APPOINTMENT (OUTPATIENT)
Dept: GENERAL RADIOLOGY | Facility: HOSPITAL | Age: 48
DRG: 799 | End: 2018-03-26
Attending: NURSE PRACTITIONER
Payer: MEDICAID

## 2018-03-26 ENCOUNTER — APPOINTMENT (OUTPATIENT)
Dept: GENERAL RADIOLOGY | Facility: HOSPITAL | Age: 48
DRG: 799 | End: 2018-03-26
Attending: INTERNAL MEDICINE
Payer: MEDICAID

## 2018-03-26 PROCEDURE — 71045 X-RAY EXAM CHEST 1 VIEW: CPT | Performed by: INTERNAL MEDICINE

## 2018-03-26 PROCEDURE — 71045 X-RAY EXAM CHEST 1 VIEW: CPT | Performed by: NURSE PRACTITIONER

## 2018-03-26 NOTE — PAYOR COMM NOTE
--------------  ADMISSION REVIEW     Payor: [address] #:  [id number]  Authorization Number: N/A    Admit date: 3/24/18  Admit time: 2116       Admitting Physician: Jessica Dunn MD  Attending Physician:  Christie Farley MD  Primary DIFFERENTIAL[[id number]]          Abnormal            Final result                 Please view results for these tests on the individual orders.    RAINBOW DRAW BLUE   RAINBOW DRAW LAVENDER   RAINBOW DRAW LIGHT GREEN   RAINBOW DRAW GOLD     EKG    Rate, inte Samantha Rios RN      fentaNYL citrate (SUBLIMAZE) 1,000 mcg in sodium chloride 0.9 % 100 mL infusion     Date Action Dose Route User    3/26/2018 0142 New Bag 25 mcg/hr Intravenous Yoanna Aguilar RN      Insulin Aspart Pen (NOVOLOG) 100 UNIT/ML flexpen 1- Leonora Arreola, RN      morphINE sulfate (PF) 4 MG/ML injection 2 mg     Date Action Dose Route User    3/25/2018 1505 Given 2 mg Intravenous Robyn Green RN      morphINE sulfate (PF) 4 MG/ML injection 4 mg     Date Action Dose Route User    3/25/2018 1651 G prn  -stat Hgb- labs were not drawn b/c of inability to gain vein access.   Will obtain labs from line  -consider repeat CT abdomen if hgb has dropped significantly to assess for intra-abd process  -lactate serially  -no obvious signs of sepsis/infection th

## 2018-03-26 NOTE — PROGRESS NOTES
[facility]  RAPID RESPONSE NOTE     Chanda Arce Patient Status:  Inpatient    1970 MRN [id number]   [facility] 3NE-A Attending Raeann Bae MD   Hosp Day # 2 PCP Maryse Cavazos DO     Reason for RRT: unresponsive, hypo Propofol drip   LA 3.6  Lower   INR 1.43   Hgb 7.8   FFP, 1 unit PRBC's   keppra BID   GI signed off   Surgery/ pulm following   Wife at bedside  Discussed plan of care     Samy Carranza NP   3/26/18

## 2018-03-26 NOTE — CONSULTS
[facility]                       Gastroenterology [address] [facility]    Queenie Corona Patient Status:  Inpatient    1970 MRN [id number]   [facility] 4SW-A Attending Litzy Buchanan MD   Hosp Day # 2 PCP Hi docusate sodium (COLACE) liquid 100 mg 100 mg Oral BID   PEG 3350 (MIRALAX) powder packet 17 g 17 g Oral Daily PRN   magnesium hydroxide (MILK OF MAGNESIA) 400 MG/5ML suspension 30 mL 30 mL Oral Daily PRN   bisacodyl (DULCOLAX) rectal suppository 10 mg 1 Once   [COMPLETED] ondansetron HCl (ZOFRAN) injection 4 mg 4 mg Intravenous Once   [COMPLETED] sodium chloride 0.9% IV bolus 1,000 mL 1,000 mL Intravenous Once   [COMPLETED] ketorolac tromethamine (TORADOL) 30 MG/ML injection 30 mg 30 mg Intravenous Once Rheumatologic: The patient reports no history of chronic arthritis, myalgias, arthralgias            Genitourinary:  The patient reports no history of recurrent urinary tract infections, hematuria, dysuria, or nephrolithiasis           Psychiatric: + javier CREATSERUM  1.19  1.67*  1.56*   GFRAA  84  56*  61   GFRNAA  73  48*  52*   CA  7.0*  10.3  8.8   NA  139  139  141   K  4.5  6.5*  4.7   CL  106  108  107   CO2  23.0  18.0*  26.0     Recent Labs   Lab  03/26/18   0548   RBC  2.74*   HGB  7.8*   HCT  2 unremarkable. The kidneys are within normal limits. There is no evidence of retroperitoneal hemorrhage. The abdominal aorta is normal in caliber. The urinary bladder is slightly compressed by the hematoma which is sitting on the dome of the bladder. Gastroenterology  [phone]    Attending physician addendum:   I personally saw and examined the patient, agree with the above findings, assessment and plan.     Casey Enrique is a 57year-old male who is being seen in consultation for anemia and s

## 2018-03-26 NOTE — PROGRESS NOTES
Pt returned from OR intubated and sedated s/p exp lap, evacuation of intraabdominal hemorrhage and splenectomy. He initially was hypertensive and was started on Propofol and Fentanyl for sedation. Labs obtained. IVF started.   Pt having good urine output

## 2018-03-26 NOTE — CONSULTS
[facility]  Report of Consultation    Alan Pedersen Patient Status:  Inpatient    1970 MRN [id number]   [facility] 4SW-A Attending Robert Booth MD   [facility] Day # 1 PCP Quang Cannon DO     Date of consultation:      3- drugs.     Allergies:    Dilantin                Itching    Comment:irriatibility  Morphine                Rash  Norco [Hydrocodone-*    Itching    Comment:Itching, dizziness    Medications:    Current Facility-Administered Medications:   •  melatonin tab T MG/ML injection 1 mg, 1 mg, Intravenous, Q2H PRN **OR** morphINE sulfate (PF) 4 MG/ML injection 2 mg, 2 mg, Intravenous, Q2H PRN **OR** morphINE sulfate (PF) 4 MG/ML injection 4 mg, 4 mg, Intravenous, Q2H PRN  •  Insulin Aspart Pen (NOVOLOG) 100 UNIT/ML fl 2.16*   HGB  6.2*   HCT  19.1*   MCV  88.4   MCH  28.7   MCHC  32.5   RDW  13.4   NEPRELIM  6.85*   WBC  9.9   PLT  135.0*     Recent Labs   Lab  03/24/18   1846  03/24/18   1944  03/25/18   0555  03/25/18   1700   GLU  341*   --   129*  224*   BUN  14   - any proposed surgery were also fully reviewed with the patient. Any proposed surgical procedure was described in detail. The patient verbalizes understanding. All questions from the patient were discussed in detail to the patient's satisfaction.   No othe

## 2018-03-26 NOTE — OPERATIVE REPORT
[facility]  Op Note    Jennifer Mayen Location: [facility] [id number] MRN [id number]   Admission Date 3/24/2018 Operation Date 3/26/2018   Attending Physician Rubina Ross MD Operating Physician Esther Mercado MD       Date of procedure:     March this time and wish to proceed with surgery as discussed         Discussed: The potential risks and benefits of surgery were discussed with the patient as well as family members at the bedside.   These are including but not limited to bleeding, infection quadrant. This was quickly evacuated. Approximately 2000 cc of old clot was removed  The small bowel was then packed away into the lower abdomen  Examination of the left upper quadrant revealed a large splenic rupture.   The decision was made to proceed w conclusion of the procedure. The patient was extubated in the operating room and was taken to the recovery room in stable condition.     Pathologic Specimen:     Spleen    Drains:    Jarett-Frank ×1    EBL: 2000 cc an old clot, 600 cc fresh blood

## 2018-03-26 NOTE — PROGRESS NOTES
[address] Patient Status:  Inpatient    1970 MRN [id number]   [facility] 4SW-A Attending Higinio Posey MD   [facility] Day # 2 PCP Reggie Leach DO     Critical Care Progress Note     Date of Admission: 3/24/201 fentaNYL citrate (SUBLIMAZE) 0.05 MG/ML injection 50 mcg, 50 mcg, Intravenous, Q5 Min PRN  •  0.9%  NaCl infusion, , Intravenous, Continuous  •  levETIRAcetam (KEPPRA) 750 mg in sodium chloride 0.9 % 100 mL IVPB, 750 mg, Intravenous, Q12H  •  [START ON 3/2 appears stated age and sedated, intubated  Lungs: clear to auscultation bilaterally  Heart: regular rate and rhythm  Abdomen: distended, hypoactive BS, CHRISTINE in place  Extremities: extremities normal, atraumatic, no cyanosis or edema       Lab Results  Compon gtt  -CIWA once extubated. Will consider starting precedex empirically at time of extubation  -MVI  6. TERRIE- due to shock/hemorrhage- creat down trending downward with good UO  -monitor; suspect will improve rapidly  7. Coagulopathy- due to consumption.   Nelly Sessions

## 2018-03-26 NOTE — RESPIRATORY THERAPY NOTE
Attempt was made to obtain weaning parameters however PT remains quite lethargic this evening, Mv 4.0 when placed on PS 5/5.  PT placed back on vent, will obtain parameters in AM.

## 2018-03-26 NOTE — PROGRESS NOTES
Called by nurse for results of CT abd- large intraperitoneal hemorrhage  Patient now in shock with on pressors and hgb 6 from 11.9  Sx and GI consulted stat  Patient to go to OR emergently. Dr. Javier Lake in the room discussing with loved ones.

## 2018-03-26 NOTE — CM/SW NOTE
Pt is out of network here with his insurance. At present, pt is vented. POD #0 splenectomy (bleeding and rupture)  Not currently stable for network transfer consideration.   Kasey Marques, 03/26/18, 4:21 PM

## 2018-03-26 NOTE — PROGRESS NOTES
[facility]  Progress Note     Rocco Gale Patient Status:  Inpatient    1970 MRN [id number]   [facility] 4SW-A Attending Renny Colbert MD   Hosp Day # 2 PCP Ellen Cool DO     Chief Complaint: abdominal pain    S: ov 1.45*  1.43*       No results for input(s): TROP, CK in the last 72 hours. Imaging: Imaging data reviewed in Epic.     Medications:   • docusate sodium  100 mg Oral BID   • Chlorhexidine Gluconate  15 mL [email or web address]   • levETIRAcetam

## 2018-03-26 NOTE — BRIEF OP NOTE
Pre-Operative Diagnosis: intraabdominal bleeding- hemoperitoneum     Post-Operative Diagnosis: RUPTURED SPLEEN      Procedure Performed:   Procedure(s):  EXPLORATORY LAPAROTOMY, EVACUATION OF INTRAABDOMINAL HEMORRHAGE AND SPLENECTOMY    Surgeon(s) and Role

## 2018-03-26 NOTE — PLAN OF CARE
Pt arrived after RRT called for pt unconscious + hypotension. Per RN report bp 68'L systolic and unconsciousness appeared to be a seizure. Per RN report, pt had received ativan, morphine in hours prior.     Pt arrived pale in color, alert,able to answer

## 2018-03-26 NOTE — PROGRESS NOTES
[facility]  Progress Note    Jake Ford Patient Status:  Inpatient    1970 MRN [id number]   [facility] 4SW-A Attending Samuel Mcclure MD   Hosp Day # 2 PCP Rupa Paulino DO     Subjective:  Patient intubated.  One unit tom External hemorrhoids without mention of complication     Alcoholic cirrhosis of liver ([facility] [address]     Essential hypertension     Medically noncompliant     Alcohol dependence ([facility] [address]     Alcohol withdrawal syndrome without complication (HCC)     Hypomagnesemia protocol after extubation   7. DVT prophylaxis- SCDs  8. Discussed with family at bedside, all questions answered    My total face time with this patient was 24 minutes.   Greater than half of our visit was spent in counseling the patient on the above liste

## 2018-03-26 NOTE — PROGRESS NOTES
03/25/18 2842   Clinical Encounter Type   Visited With Patient; Family   Routine Visit Follow-up   Continue Visiting Yes  (upon request or as needed)   Surgical Visit Pre-op   Crisis Visit Critical care   Patient Spiritual Encounters   Spiritual Needs Fa

## 2018-03-27 ENCOUNTER — APPOINTMENT (OUTPATIENT)
Dept: GENERAL RADIOLOGY | Facility: HOSPITAL | Age: 48
DRG: 799 | End: 2018-03-27
Attending: NURSE PRACTITIONER
Payer: MEDICAID

## 2018-03-27 PROCEDURE — 71045 X-RAY EXAM CHEST 1 VIEW: CPT | Performed by: NURSE PRACTITIONER

## 2018-03-27 RX ORDER — IBUPROFEN 800 MG/1
TABLET ORAL
Qty: 90 TABLET | Refills: 0 | OUTPATIENT
Start: 2018-03-27

## 2018-03-27 NOTE — PROGRESS NOTES
[facility]  Progress Note     Merline Thomason Patient Status:  Inpatient    1970 MRN [id number]   [facility] [place] 4SW-A Attending Yessica Uriostegui MD   Hosp Day # 3 PCP Shawn Gutiérrez DO     Chief Complaint: abdominal pain    S:  F TP  5.9*  4.6*  5.4*       Estimated Creatinine Clearance: 106.8 mL/min (based on SCr of 0.78 mg/dL).     Recent Labs   Lab  03/25/18   1700  03/26/18   0057  03/27/18   0530   PTP  18.2*  18.0*  16.5*   INR  1.45*  1.43*  1.28*       No results for input dependent on: progress  At this point Mr. Kumar Atkinson is expected to be discharge to: TBD    Plan of care discussed with patient , family  and RN  Trang Otto, ACOSTA       IM ATTENDING    Constance Severance note reviewed and agree with above    Tammy Delvalle

## 2018-03-27 NOTE — RESPIRATORY THERAPY NOTE
Received pt on vent VC+ 14/500/40%/+5. BS are diminished. minimal secretions. Will continue to monitor.

## 2018-03-27 NOTE — PLAN OF CARE
CARDIOVASCULAR - ADULT    • Maintains optimal cardiac output and hemodynamic stability Progressing        GASTROINTESTINAL - ADULT    • Minimal or absence of nausea and vomiting Progressing        METABOLIC/FLUID AND ELECTROLYTES - ADULT    • Glucose maint

## 2018-03-27 NOTE — CM/SW NOTE
Pt has been successfully weaned and extubated today. Met with pt and wife at ICU bedside. Made wife aware of OON here with his [employer] insurance. Per wife, pt is NOT with [employer].  Wife not sure where this information could have come f

## 2018-03-27 NOTE — PROGRESS NOTES
[facility]  Progress Note    Evertt Nail Patient Status:  Inpatient    1970 MRN [id number]   [facility] 4SW-A Attending Christie Farley MD   Hosp Day # 3 PCP Mehul Negron DO     Subjective:  Patient extubated, very somnole (acquired)     Contact dermatitis and other eczema, due to unspecified cause     External hemorrhoids without mention of complication     Alcoholic cirrhosis of liver (HCC)     Essential hypertension     Medically noncompliant     Alcohol dependence ([facility] [address] hgb, monitor drains   5. CIWA protocol   6. DC mariee and NG - may have sips of clears if awake enough   7. Continue central line and art line for 1 more day   8. DVT prophylaxis- SCDs  9.  Discussed with family at bedside, all questions answered    My total

## 2018-03-27 NOTE — PAYOR COMM NOTE
--------------  CONTINUED STAY REVIEW    Payor: [address] #:  [id number]  Authorization Number: N/A    Admit date: 3/24/18  Admit time: 2116    Admitting Physician: Jeimy Montalvo MD  Attending Physician:  MD Roselyn Allen No infiltrates.     ASSESSMENT/PLAN:  1.  Acute resp failure- secondary to shock as well as seizures  -cont with vent support, wean sedation and obtain weaning parameters if possible  -if pt shows signs of agitation, would keep intubated for one more day gi Precedex, Propofol and Levophed gtts

## 2018-03-27 NOTE — BH PROGRESS NOTE
Spoke with pt's RN. Pt was seen and assessed on 3/25. He does not need to be seen again. Liaison orders to be discontinued.

## 2018-03-27 NOTE — PLAN OF CARE
CARDIOVASCULAR - ADULT    • Maintains optimal cardiac output and hemodynamic stability Progressing          RESPIRATORY - ADULT    • Achieves optimal ventilation and oxygenation Progressing          Pt received intubated. Sedated on precedex and fentanyl.

## 2018-03-27 NOTE — PROGRESS NOTES
Critical Care Progress Note        NAME: Lin Byrd - ROOM: [address] [place] [address]- - MRN: [id number] - Age: 57year old - : 1970  Date of Admission: 3/24/2018  5:57 PM  Admission Diagnosis: Alcohol abuse [F10.10]  Alcohol-induced acute pancreatitis, unsp norepinephrine Stopped (03/26/18 8736)     PRN Medication:hydrALAzine HCl, fentaNYL citrate **OR** fentaNYL citrate, acetaminophen **OR** acetaminophen **OR** acetaminophen, fentanyl (SUBLIMAZE) infusion, PEG 3350, magnesium hydroxide, bisacodyl, FLEET ASH ----------  TSH   Date/Time Value Ref Range Status   02/26/2017 05:18 AM 0.799 0.350 - 5.500 mIU/mL Final   03/10/2016 07:16 AM 0.703 0.350 - 5.500 mIU/mL Final   03/30/2011 07:11 PM 0.850 0.350 - 5.500 mIU/mL Final   ----------  Albumin   Date/Time Valu

## 2018-03-28 ENCOUNTER — APPOINTMENT (OUTPATIENT)
Dept: GENERAL RADIOLOGY | Facility: HOSPITAL | Age: 48
DRG: 799 | End: 2018-03-28
Attending: NURSE PRACTITIONER
Payer: MEDICAID

## 2018-03-28 PROCEDURE — 71045 X-RAY EXAM CHEST 1 VIEW: CPT | Performed by: NURSE PRACTITIONER

## 2018-03-28 NOTE — PROGRESS NOTES
Notified by RN of patient hypertensive with /109 (133) despite receiving PRN hydralazine and labetolol. Patient denies uncontrolled pain, however, appears to be escalating with his ETOH withdrawal and requiring more frequent ativan IV PRN.  Given unco

## 2018-03-28 NOTE — PROGRESS NOTES
[facility]  Progress Note    Yvonne Barbara Patient Status:  Inpatient    1970 MRN [id number]   [facility] 4SW-A Attending Sara Skinner MD   Hosp Day # 4 PCP Ginger Mcpherson DO     Subjective:  Patient on a Precedex overnight cause     External hemorrhoids without mention of complication     Alcoholic cirrhosis of liver ([address]     Essential hypertension     Medically noncompliant     Alcohol dependence ([address]     Alcohol withdrawal syndrome without complication ([address]     Hypomagn protocol–discussed with nurse, will attempt to use Ativan in place of Precedex today to limit the patient's sedation  6.  Discussed with the patient, family, and nurse our goal for today to have the patient out of bed, small amount of ambulation, and sittin

## 2018-03-28 NOTE — PLAN OF CARE
CARDIOVASCULAR - ADULT    • Maintains optimal cardiac output and hemodynamic stability Progressing        DISCHARGE PLANNING    • Discharge to home or other facility with appropriate resources Progressing        GASTROINTESTINAL - ADULT    • Minimal or abs

## 2018-03-28 NOTE — PROGRESS NOTES
[facility]  Progress Note     Viry Royal Patient Status:  Inpatient    1970 MRN [id number]   [facility] 4SW-A Attending Eric Maloney MD   Hosp Day # 4 PCP Deysi Nance DO     Chief Complaint: abdominal pain    S: 7.9*   ALB  2.8*  2.1*  2.3*   --    NA  139  141  143  136   K  6.5*  4.7  4.1  3.4*   CL  108  107  109  102   CO2  18.0*  26.0  25.0  27.0   ALKPHO  112  80  88   --    AST  330*  401*  194*   --    ALT  81*  71*  76*   --    BILT  2.0  1.1  0.6   -- 12. Leukocytosis  1. Stress reaction  2. Cbc am   3. Low grade temps, CXR worse today, needs to work on [employer] of care: as above  PT is now out of network per ARLYN notes- insurance was changed without pt/ wife being aware. ARLYN working w/ insurance.  Vicenta Vivas

## 2018-03-28 NOTE — CM/SW NOTE
Received confirmation back from insurance verifiers that pt is showing [employer] as current insurance through the [facility] portal.   Met with Mrs. Sonia Dan to make aware.  She has been checking with Medicaid and has now been notified of this change to [employer]

## 2018-03-28 NOTE — PROGRESS NOTES
[address] [place] [address] Patient Status:  Inpatient    1970 MRN [id number]   [facility] 4SW-A Attending Samuel Mcclure MD   Hosp Day # 4 PCP Rupa Paulino DO     Pulm / Critical Care Progress Note     S: extubated yesterd [KYYQK:7748; Drains:115]     Physical Exam:   General: alert, cooperative, confused   Head: Normocephalic, without obvious abnormality, atraumatic. Lungs: ctab   Chest wall: No tenderness or deformity.    Heart: Regular rate and rhythm, normal S1S2, no mu Cont icu  -wife updated      Joycelyn Sargent M.D.  [facility] pulmonary / critical care  3/28/2018  11:40 AM

## 2018-03-29 ENCOUNTER — APPOINTMENT (OUTPATIENT)
Dept: GENERAL RADIOLOGY | Facility: HOSPITAL | Age: 48
DRG: 799 | End: 2018-03-29
Attending: NURSE PRACTITIONER
Payer: MEDICAID

## 2018-03-29 PROCEDURE — 71045 X-RAY EXAM CHEST 1 VIEW: CPT | Performed by: NURSE PRACTITIONER

## 2018-03-29 NOTE — PAYOR COMM NOTE
--------------  CONTINUED STAY REVIEW    Payor: [facility] CCE    3/28      REMAINS IN ICU    In: 91570.5 [P.O.:100; I.V.:9221.8;  Blood:2922.7; IV PIGGYBACK:250]  Out: 2774 [Urine:3800; Emesis/NG output:85; Drains:886; Blood:2600]  I/O this shift: Hgb and transfuse to keep >8  3. Splenic rupture- s/p resection and evacuation of hematoma  -mgt per Kris  4. -currently off precedex. 5. ETRRIE- due to shock/hemorrhage- creat has now nrmlized  -monitor UOP  6.  Coagulopathy- due to consumption.  Monitor

## 2018-03-29 NOTE — PROGRESS NOTES
[facility]  Progress Note     Harriet Clarke Patient Status:  Inpatient    1970 MRN [id number]   [facility] 4SW-A Attending Zac Brooks MD   Hosp Day # 5 PCP Yumiko Andrews DO     Chief Complaint: abdominal pain    S: c/ 0.55 mg/dL (L)). Recent Labs   Lab  03/27/18   0530  03/28/18   0400   PTP  16.5*  14.7   INR  1.28*  1.10       No results for input(s): TROP, CK in the last 72 hours. Imaging: Imaging data reviewed in Epic.     Medications:   • docusate sodium date missing on Monday  UO very good  Still w/ low grade temps- but WBC lower , cont IS.   Will need vaccinations soon after /dc  Spoke w/ SW not getting any calls about needing to tx to in net work hospital- don't anticipate any w/ upcoming holiday     Americo Distance

## 2018-03-29 NOTE — PROGRESS NOTES
Critical Care Progress Note     Assessment / Plan:  1. Acute resp failure - secondary to shock and ETOH w/drawal as well as seizures  -extubated 3/27  -on RA  2. Acute blood loss anemia  -trend H+H  3.  Splenic rupture - s/p resection and evacuation of hema

## 2018-03-29 NOTE — PLAN OF CARE
GASTROINTESTINAL - ADULT    • Minimal or absence of nausea and vomiting Progressing    • Maintains or returns to baseline bowel function Progressing        NEUROLOGICAL - ADULT    • Absence of seizures Progressing        RESPIRATORY - ADULT    • Achieves o

## 2018-03-29 NOTE — PROGRESS NOTES
[facility]  Progress Note    Deysi Sandra Patient Status:  Inpatient    1970 MRN [id number]   [facility] 4SW-A Attending Yenni Langford MD   Hosp Day # 5 PCP Le Eugene DO     Subjective:  Patient sitting up in chair, mu External hemorrhoids without mention of complication     Alcoholic cirrhosis of liver ([facility] [address]     Essential hypertension     Medically noncompliant     Alcohol dependence ([facility] [address]     Alcohol withdrawal syndrome without complication (HCC)     Hypomagnesemia pulmonary recommendations, use of IS  6. Continue ICU care  7. CIWA protocol  8. DVT prophylaxis- SCDs  9. Discussed with family at bedside, all questions answered  10.  Patient is out of network and may be transferred when medically stable    My total face

## 2018-03-29 NOTE — PROGRESS NOTES
[facility] [address]  [place], [address]  ?  03/29/18  ? Re: Evertt Nail  ?   To Whom It May Concern:    Evertt Nail was admitted to [facility] from 3/24/2018 to present     Please excuse Evertt Nail from att

## 2018-03-30 ENCOUNTER — APPOINTMENT (OUTPATIENT)
Dept: CT IMAGING | Facility: HOSPITAL | Age: 48
DRG: 799 | End: 2018-03-30
Attending: SURGERY
Payer: MEDICAID

## 2018-03-30 PROCEDURE — 74176 CT ABD & PELVIS W/O CONTRAST: CPT | Performed by: SURGERY

## 2018-03-30 NOTE — PROGRESS NOTES
Pulmonary Progress Note        NAME: Rafiq Mary - ROOM: [facility]-A - MRN: [id number] - Age: 57year old - : 1970        SUBJECTIVE: No events overnight    OBJECTIVE:   18  0400 18  0500 18  0530 18  0600   BP: 95/70 11 03/29/18 0400 03/29/18  0834 03/29/18 2015   WBC  --   --  12.5  --   --    HGB 8.7* 9.5* 8.8* 8.7* 9.7*   MCV  --   --  83.0  --   --    PLT  --   --  261.0  --   --    INR 1.10  --   --   --   --          Recent Labs   03/28/18 0400 03/29/18 0400 03/

## 2018-03-30 NOTE — PROGRESS NOTES
[facility]  Progress Note    Morris Lesser Patient Status:  Inpatient    1970 MRN [id number]   [facility] 4SW-A Attending Paul Chao MD   Hosp Day # 6 PCP Le Eugene DO     Subjective:  Pt states he feels better still mention of complication     Alcoholic cirrhosis of liver ([facility] [address]     Essential hypertension     Medically noncompliant     Alcohol dependence ([facility] [address]     Alcohol withdrawal syndrome with complication (HCC)     Hypomagnesemia     Anxiety     Depressive disorder DO  [facility]  3/30/2018  8:33 AM

## 2018-03-30 NOTE — PAYOR COMM NOTE
--------------  CONTINUED STAY REVIEW    Payor: [facility]      3/30    PT REMAIN IN ICU TODAY    I HAVE BEEN TRYING TO CONTACT YOUR UTILIZATION REVIEW  PLEASE FAX BACK IF ALL DAYS ARE AUTHORIZED ON THIS PATIENT SO FAR AND PLEASE LET US KNOW IF

## 2018-03-30 NOTE — PROGRESS NOTES
[facility]  Progress Note     Jake Ford Patient Status:  Inpatient    1970 MRN [id number]   [facility] 4SW-A Attending Samuel Mcclure MD   Hosp Day # 6 PCP Rupa Paulino DO     Chief Complaint: abdominal pain    S: 72 hours. Imaging: Imaging data reviewed in Epic. Ct scan abd   CONCLUSION:    1. The patient has undergone a recent splenectomy.   There is a complex collection involving the left upper quadrant within the splenic bed measuring 12.6 x 3.9 x 6.5 cm to attempt drainage, No SBO   Not felt to be bleeding   2. Acute blood loss anemia due to ruptures spleen   1. Transfuse PRN  2. Hgb  Stable   9.1  Better    3.  Splenic rupture  1. s/p emergent OR- explor lap, evacuation of intraabdominal hemorrhage and sp control  Tr and avoid IV dilaudid if possible  [facility] protocol    Kathy Donis M.D.   [employer]

## 2018-03-30 NOTE — CM/SW NOTE
Advised in ICU rounds pt for CT abd today. Febrile issues. On cardene. Continues CIWA protocol. Currently not yet stable for network transfer consideration.   Ana Maria Leroy, 03/30/18, 11:20 AM

## 2018-03-31 ENCOUNTER — APPOINTMENT (OUTPATIENT)
Dept: GENERAL RADIOLOGY | Facility: HOSPITAL | Age: 48
DRG: 799 | End: 2018-03-31
Attending: INTERNAL MEDICINE
Payer: MEDICAID

## 2018-03-31 PROCEDURE — 71045 X-RAY EXAM CHEST 1 VIEW: CPT | Performed by: INTERNAL MEDICINE

## 2018-03-31 NOTE — PROGRESS NOTES
[facility]  Progress Note     Queenie Corona Patient Status:  Inpatient    1970 MRN [id number]   [facility] 4SW-A Attending Litzy Buchanan MD   Hosp Day # 7 PCP Aldo Franco DO     Chief Complaint: abdominal pain    S: reviewed in Epic. Ct scan abd   CONCLUSION:    1. The patient has undergone a recent splenectomy.   There is a complex collection involving the left upper quadrant within the splenic bed measuring 12.6 x 3.9 x 6.5 cm which may represent postsurgical change collection in splenic bed- drain in place- surgery reviewed- cont CHRISTINE drain, no plan to attempt drainage, No SBO   Not felt to be bleeding   2. Acute blood loss anemia due to ruptures spleen   1. Transfuse PRN  2. Hgb  Stable   9.7   3. Splenic rupture  1.

## 2018-03-31 NOTE — PROGRESS NOTES
[facility]  Progress Note    Jennifer Mayen Patient Status:  Inpatient    1970 MRN [id number]   [facility] 4SW-A Attending Rubina Ross MD   Hosp Day # 7 PCP Le Eugene DO     Subjective:  Patient's mental status improve  03/31/2018   CA 8.7 03/31/2018       Lab Results  Component Value Date   MG 1.6 03/31/2018       Assessment/Plan:  Patient Active Problem List:     Insomnia, unspecified     Unequal leg length (acquired)     Contact dermatitis and other eczema, d stable hemoglobin  CT shows fluid collection, likely residual clot, left upper quadrant it is in proximity to the drain. Fevers with increasing leukocytosis infection of indeterminate source continue antibiotics.     I spent  34  minutes face to face with

## 2018-03-31 NOTE — PROGRESS NOTES
Pulmonary Progress Note     Assessment / Plan:  1. Acute resp failure   -resolved  2. Acute blood loss anemia  -stable  3. Splenic rupture - s/p resection and evacuation of hematoma  -mgt per surgery  4.  ETOH withdrawal with associated seizures  -CLAUDIO VILLATORO

## 2018-04-01 NOTE — PLAN OF CARE
CONFUSION    • Confusion, delirium, dementia or psychosis is improved or at baseline Progressing        NEUROLOGICAL - ADULT    • Absence of seizures Progressing        PAIN - ADULT    • Verbalizes/displays adequate comfort level or patient's stated pain g

## 2018-04-01 NOTE — PROGRESS NOTES
[facility]  Progress Note    Clari Drake Patient Status:  Inpatient    1970 MRN [id number]   [facility] 4SW-A Attending Jennifer Esteban MD   Hosp Day # 8 PCP Le Eugene DO     Subjective:  No new complaints, incisional p the liver with stable wedge-shaped focal area of low attenuation involving the right lobe of the liver. BILIARY:  No visible dilatation or calcification.     PANCREAS:  Persistent focal low-attenuation areas seen within the distal body and tail, may repres splenectomy. There is a complex collection involving the left upper quadrant within the splenic bed measuring 12.6 x 3.9 x 6.5 cm which may represent postsurgical change, residual hemoperitoneum, new hemo peritoneum   or a combination of those.   A surgica shift:   In: 48 [IV PIGGYBACK:50]  Out: -     Assessment  Patient Active Problem List:     Insomnia, unspecified     Unequal leg length (acquired)     Contact dermatitis and other eczema, due to unspecified cause     External hemorrhoids without mention of much of a residual hematoma. Post op tissue changes in face of pancreatitis more likely  WBC correcting on current therapy    Plan:  1. Bed rest and restraints  2. Not awake enough today for a diet  3. Abx per ID  4. Drains  5.  Dr. Stormy Garnett back on Tuesday am

## 2018-04-01 NOTE — PLAN OF CARE
Received pt this am alert to name. Very confused. Wife at bedside, assists with reorienting patient. Bilateral wrist restraints, posey vest. Precedex gtt.    ~1010am: pt with sudden outburst of confusion/agitation/ agression. Code support called.  Adam Potts

## 2018-04-01 NOTE — CONSULTS
INFECTIOUS DISEASE CONSULT NOTE    Chanda Caballeroshekhar Patient Status:  Inpatient    1970 MRN [id number]   [facility] 4SW-A Attending Raeann Bae MD   [facility] Day # 8 PCP Refugia Hemp 3.375 g, Intravenous, Q8H  •  famoTIDine (PEPCID) injection 20 mg, 20 mg, Intravenous, BID  •  DiphenhydrAMINE HCl (BENADRYL) injection 12.5 mg, 12.5 mg, Intravenous, Q4H PRN **OR** diphenhydrAMINE (BENADRYL) cap/tab 25 mg, 25 mg, Oral, Q4H PRN  •  Polyvin PEG 3350 (MIRALAX) powder packet 17 g, 17 g, Oral, Daily PRN  •  magnesium hydroxide (MILK OF MAGNESIA) 400 MG/5ML suspension 30 mL, 30 mL, Oral, Daily PRN  •  bisacodyl (DULCOLAX) rectal suppository 10 mg, 10 mg, Rectal, Daily PRN  •  FLEET ENEMA (FLEET) 158 lb 15.2 oz (72.1 kg), SpO2 95 %. HEENT: Moist mucous membranes. Extraocular muscles are intact. Neck: No lymphadenopathy. No JVD. No carotid bruits.  No meningismus  Respiratory: Diminished at bases otherwise CTA bilaterally  Cardiovascular: S1, S2. status     Benign essential HTN     Transaminitis     Alcoholic hepatitis without ascites     Severe alcohol use disorder (HCC)     Anemia     Ileus (HCC)     Other acute appendicitis     Anxiety disorder     Type 2 diabetes mellitus with complication, wit (first), Pneumovax (later), HiB, Meningitis B vaccine, conjugate quadrivalent Meningitis vaccine, and annual Influenza vaccination.   -Antibiotic plan of care and need for post-splenectomy vaccinations discussed with family member at bedside who voiced und

## 2018-04-01 NOTE — PROGRESS NOTES
[facility]  Progress Note     Cassandra Field Patient Status:  Inpatient    1970 MRN [id number]   [facility] 4SW-A Attending Bobbi Vargas MD   Hosp Day # 8 PCP Sara Hackett DO     Chief Complaint: abdominal pain    S: measuring 12.6 x 3.9 x 6.5 cm which may represent postsurgical change, residual hemoperitoneum, new hemo peritoneum   or a combination of those. A surgical drain is seen in place which is passing through the region of this collection.   Evaluation for acti drainage, No SBO   Not felt to be bleeding   2. Acute blood loss anemia due to ruptures spleen   1. Transfuse PRN  2. Hgb  Stable   9.0  3. Splenic rupture  1. s/p emergent OR- explor lap, evacuation of intraabdominal hemorrhage and splenectomy  3/26   2.

## 2018-04-01 NOTE — PROGRESS NOTES
[facility] [address]  [place], [address]  ?  04/01/18  ? Re: Chanda Arce  ? To Whom It May Concern:    Chanda Arce was admitted to [facility] on 3/24/2018 and is currently still an inpatient.     Thank you,    Abigail

## 2018-04-01 NOTE — PROGRESS NOTES
Critical Care Progress Note     Assessment / Plan:  1. Acute resp failure   -resolved  2. Acute blood loss anemia  -stable  3. Splenic rupture - s/p resection and evacuation of hematoma  -mgt per surgery  4.  ETOH withdrawal with associated seizures   -sche

## 2018-04-02 ENCOUNTER — APPOINTMENT (OUTPATIENT)
Dept: GENERAL RADIOLOGY | Facility: HOSPITAL | Age: 48
DRG: 799 | End: 2018-04-02
Attending: INTERNAL MEDICINE
Payer: MEDICAID

## 2018-04-02 PROCEDURE — 71045 X-RAY EXAM CHEST 1 VIEW: CPT | Performed by: INTERNAL MEDICINE

## 2018-04-02 NOTE — PROGRESS NOTES
04/02/18 1829   Clinical Encounter Type   Visited With Patient   Routine Visit Follow-up  (Chaplain Lola Trejo talked with Jostin Rodríguez from their [facility].   He will visit.)   Continue Visiting No

## 2018-04-02 NOTE — CONSULTS
[facility]  Report of Psychiatric Consultation    Lisa Bruno Patient Status:  Inpatient    1970 MRN [id number]   [facility] 4SW-A Attending Cecilio Chaney MD   [facility] Day # 9 PCP Mary Kate Feliciano DO     Date of Admission: 3/24/ kd. At baseline, he has no known cognitive deficits.      Syd Nguyen  4/2/2018    History of Present Illness:  56 yo WM with severe alcohol use disorder and hx of recurrent alcohol induced pancreatitis was admitted on 3/24/18 for evaluation of his abdo since 2/2018.      Family Hx: Both parents had alcohol use disorder    Past Medical History:   Diagnosis Date   • Back problem    • Depression    • Diabetes ([facility] [address]    • ETOH abuse    • High blood pressure    • Scoliosis    • Seizure disorder (HCC)    • Unsp 0.9 % 250 mL infusion, 5-15 mg/hr, Intravenous, Continuous  •  hydrALAzine HCl (APRESOLINE) injection 10 mg, 10 mg, Intravenous, Q4H PRN  •  LORazepam (ATIVAN) injection 1 mg, 1 mg, Intravenous, Q1H PRN  •  LORazepam (ATIVAN) injection 2 mg, 2 mg, Intraven 0.5-30 mcg/min, Intravenous, Continuous  •  0.9%  NaCl infusion, , Intravenous, Once  •  0.9%  NaCl infusion, , Intravenous, Once  •  ondansetron HCl (ZOFRAN) injection 4 mg, 4 mg, Intravenous, Q4H PRN  •  glucose (DEX4) oral liquid 15 g, 15 g, Oral, Q15 M 04/02/2018   CONCETTA 23 04/02/2018   LIP 61 04/02/2018   MG 2.2 04/02/2018   PGLU 186 04/02/2018

## 2018-04-02 NOTE — PAYOR COMM NOTE
--------------  CONTINUED STAY REVIEW        3/31    REMAIN IN ICU    S:    confused      Vital signs:  Temp:  [98 °F (36.7 °C)-100.3 °F (37.9 °C)] 98 °F (36.7 °C)  Pulse:  [] 93  Resp:  [11-18] 16  BP: (111-142)/(61-91) 139/83     Physical Exam: also seen, may be emanating from the pancreas for from knee previously mentioned splenic bed   collection.  Correlate for any signs of pancreatitis.   3. Focal area of wall thickening involving the sigmoid colon spanning approximately 4 cm.  May represent i 13. Leukocytosis  1. Stress reaction  2. Lower    3. Low grade temps,   CXR showing some improvement , cont IS           4/1    REMAINS IN ICU    1. Hemorrhagic shock- resolved off pressors   1. Due to splenic rupture  2. Surgery following  3.  Ct scan ab

## 2018-04-02 NOTE — PHYSICAL THERAPY NOTE
Chart reviewed and spoke with nursing, Ruthann Bryant. Pt is currently sedated from doses of Ativan and Haldol from this morning. Pt is also physically restrained. When pt is alert he is aggressive and agitated as per APN note of 6:50 am today.   Psych has been re

## 2018-04-02 NOTE — PROGRESS NOTES
Patient with 2 episodes of extreme agitation, delusions, and paranoid, psychotic like behavior. Precedex gtt continued and increased. Security team called to bedside both times.   Patient in posey vest, bilateral wrists and ankle soft restraints and mitts

## 2018-04-02 NOTE — PLAN OF CARE
PAIN - ADULT    • Verbalizes/displays adequate comfort level or patient's stated pain goal Adequate for Discharge          CARDIOVASCULAR - ADULT    • Maintains optimal cardiac output and hemodynamic stability Completed        GASTROINTESTINAL - ADULT    • reapplied to legs. TLC with cordis intact. Precedex infusing. Remains in wrist restraints and a posey vest.  Spouse is at bedside. See assessments and flowsheets for further data.       At approx 0145 call light on, I answered it and pt greeted me askin

## 2018-04-02 NOTE — OCCUPATIONAL THERAPY NOTE
Chart reviewed and spoke with nursing, Jaclyn Wall. Pt is currently sedated from doses of Ativan and Haldol from this morning. Pt is also physically restrained. When pt is alert he is aggressive and agitated as per APN note of 6:50 am today.   Psych has been re

## 2018-04-02 NOTE — PROGRESS NOTES
[facility]  Progress Note    Frances Brooks Patient Status:  Inpatient    1970 MRN [id number]   [facility] [place] 4SW-A Attending Higinio Posey MD   Hosp Day # 9 PCP Le Eugene DO     Subjective:  Code support was called overnig Technologist)  Patient is having left upper quadrant pain, after having his spleen removed.          FINDINGS:    LIVER:  Diffuse low attenuation involving the liver with stable wedge-shaped focal area of low attenuation involving the right lobe of the live in the infiltrated, this as well as the notable motion limits evaluation of the study.      =====  CONCLUSION:    1. The patient has undergone a recent splenectomy.   There is a complex collection involving the left upper quadrant within the splenic bed ton 2.1 04/02/2018   TP 5.7 04/02/2018       Lab Results  Component Value Date   INR 1.10 03/28/2018   INR 1.28 (H) 03/27/2018   INR 1.43 (H) 03/26/2018       I/O last 3 completed shifts: In: 0773 [P.O.:1140;  I.V.:3905; IV PIGGYBACK:950]  Out: 2125 [Urine:211 pancreatitis type     Acute on chronic pancreatitis (HCC)     Pseudocyst of pancreas     Lethargy     Acute respiratory failure with hypoxia (HCC)     Shock (HCC)     Acute abdomen     Fever    No current fevers  Reviewed CT: tissue consolidation at the ta

## 2018-04-02 NOTE — PROGRESS NOTES
04/02/18 1743   Clinical Encounter Type   Visited With Patient and family together   Routine Visit Follow-up   Continue Visiting No  ( remains available at pager [phone])   Bakari Hill was speaking pt and wife about their karoline when doctor came in.  C

## 2018-04-02 NOTE — PROGRESS NOTES
[facility]  Progress Note     Coit Jen Patient Status:  Inpatient    1970 MRN [id number]   [facility] 4SW-A Attending Meagan Graham MD   Hosp Day # 9 PCP Akshat Knapp DO     Chief Complaint: abdominal pain    S:  In Blocker)   • melatonin  2 mg Oral Nightly   • ChlordiazePOXIDE HCl  5 mg Oral TID   • Insulin Aspart Pen  1-10 Units Subcutaneous TID CC and HS   • docusate sodium  100 mg Oral BID   • levETIRAcetam  750 mg Intravenous Q12H   • multivitamin/thiamine/folic Calmer this afternoon decrease precedex ,     Quality:  · DVT Prophylaxis: SCDS  · CODE status: full  · Kilpatrick: no  · Central line: yes RIJ     Estimated date of discharge: TBD  Discharge is dependent on: progress  At this point Mr. Cristian Edwards is expected to b

## 2018-04-02 NOTE — PROGRESS NOTES
[facility]                INFECTIOUS DISEASE PROGRESS NOTE    Joe García Patient Status:  Inpatient    1970 MRN [id number]   [facility] 4SW-A Attending Addi Smith MD   [facility] Day # 9 PCP DO Kizzy Elizabethio Status: Completed Lab Status: Final result Updated: 03/26/18 2139   Specimen: Other from Nares     Mrsa Culture No MRSA Isolated   Blood Culture Once [[id number]] Collected: 03/28/18 1706   Order Status: Completed Lab Status: Preliminary result Updated: 04/ Alcohol-induced acute pancreatitis, unspecified complication status     Benign essential HTN     Transaminitis     Alcoholic hepatitis without ascites     Severe alcohol use disorder (HCC)     Anemia     Ileus (HCC)     Other acute appendicitis     Anxiety

## 2018-04-02 NOTE — PLAN OF CARE
CONFUSION    • Confusion, delirium, dementia or psychosis is improved or at baseline Not Progressing          NEUROLOGICAL - ADULT    • Absence of seizures Progressing          PAIN - ADULT    • Verbalizes/displays adequate comfort level or patient's state

## 2018-04-02 NOTE — PROGRESS NOTES
Lisa Bruno Patient Status:  Inpatient    1970 MRN [id number]   [facility] 4SW-A Attending Cecilio Chaney MD   Hosp Day # 9 PCP Mary Kate Feliciano DO     Critical Care Progress Note      Assessment/Plan:    1.  Acute blood loss anem hours) at 04/02/18 1304  Last data filed at 04/02/18 1207   Gross per 24 hour   Intake             3695 ml   Output             1230 ml   Net             2465 ml       /83   Pulse 76   Temp 98 °F (36.7 °C) (Temporal)   Resp 19   Ht 5' 6\" (1.676 m)

## 2018-04-02 NOTE — PROGRESS NOTES
PSYCH CONSULT    Date of Admission: 3/24/18  Date of Consult: 4/3/18  Reason for Consultation: Agitation, hallucination    Impression: He clearly has delirium and severe alcohol use disorder.  It is Day #10 since his last drink, so is likely done with withd

## 2018-04-03 NOTE — PROGRESS NOTES
[facility]  Progress Note     Lolita La Patient Status:  Inpatient    1970 MRN [id number]   [facility] 4SW-A Attending Maurice Noble MD   Hosp Day # 8 PCP Sahrona Bennett DO     Chief Complaint: abdominal pain    S:  W • multivitamin  1 tablet Oral Daily   • Thiamine HCl  100 mg Oral Daily   • folic acid  1 mg Oral Daily   • Sertraline HCl  50 mg Oral Daily   • famoTIDine  20 mg Intravenous BID   • lisinopril  20 mg Oral Daily   • metoprolol tartrate  50 mg Oral 2x Derrell Dearth CLAUDIO atopped , librium stopped   Cbc, BMP am   Hgb stable   Aunt at bedside   Quality:  · DVT Prophylaxis: SCDS  · CODE status: full  · Kilpatrick: no  · Central line: yes RIJ     Estimated date of discharge: TBD  Discharge is dependent on: progress  At this

## 2018-04-03 NOTE — PROGRESS NOTES
[facility]  Report of Psychiatric Progress Note    Date of Admission: 3/24/18  Date of Service: 4/3/18  Reason for Consultation: Agitation, hallucination     Impression: He continues to have delirium with night time agitation, hallucinations, and para been motivated to get treatment the past year. Will discuss and encourage him to get treatment as his delirium clears. At baseline, he has no known cognitive deficits.      Garland Crockett  4/3/2018  8:53 AM    History of Present Illness:  56 yo WM with severe program at [facility] and was sober for 2 wks afterwards. He drinks to deal with anxiety and stress. His longest period sober the past year when not in the hospital is 3 days.      Social Hx: He grew up in the [facility].  His parents cap/tab 25 mg, 25 mg, Oral, Q4H PRN  •  Polyvinyl Alcohol (LIQUI-TEARS) 1.4 % ophthalmic solution 1 drop, 1 drop, Ophthalmic, QID PRN  •  lisinopril (PRINIVIL,ZESTRIL) tab 20 mg, 20 mg, Oral, Daily  •  Metoprolol Tartrate (LOPRESSOR) tab 50 mg, 50 mg, Oral 0.2-1.5 mcg/kg/hr, Intravenous, Continuous  •  metoprolol Tartrate (LOPRESSOR) 5 MG/5ML injection 5 mg, 5 mg, Intravenous, Q6H PRN  •  norepinephrine (LEVOPHED) 4 mg/250 ml premix infusion, 0.5-30 mcg/min, Intravenous, Continuous  •  ondansetron HCl (ZOFRA

## 2018-04-03 NOTE — PHYSICAL THERAPY NOTE
Chart reviewed. Another code support called this morning. Spoke with nursing, Quique Olivarez. Pt is not appropriate due to safety concerns, and his cognitive issues, at this time. Will currently sign off the case.   Please reorder when pt is able and appropriate

## 2018-04-03 NOTE — PROGRESS NOTES
Critical Care Progress Note     Assessment / Plan:  1. Acute blood loss anemia  -stable; no tx unless Hgb < 7  2. Splenic rupture - s/p resection and evacuation of hematoma  -per surgery  3.  ETOH withdrawal with associated seizures   -scheduled Librium  -b

## 2018-04-03 NOTE — PROGRESS NOTES
[facility]                INFECTIOUS DISEASE PROGRESS NOTE    Connor Montes De Oca Patient Status:  Inpatient    1970 MRN [id number]   [facility] 4SW-A Attending Elizabeth Bartlett MD   [facility] Day # 8 PCP DO Alonso Elizabeth Final result Updated: 03/26/18 0017   Specimen: Other from Nares     Mrsa Culture No MRSA Isolated   Blood Culture Once [[id number]] Collected: 03/28/18 1709   Order Status: Completed Lab Status: Final result Updated: 04/02/18 1800   Specimen: Blood from Bl complication status     Benign essential HTN     Transaminitis     Alcoholic hepatitis without ascites     Severe alcohol use disorder (HCC)     Anemia     Ileus (HCC)     Other acute appendicitis     Anxiety disorder     Type 2 diabetes mellitus with comp
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
## 2018-04-03 NOTE — PROGRESS NOTES
[facility]  Progress Note    Levell Napoleon Patient Status:  Inpatient    1970 MRN [id number]   [facility] 4SW-A Attending Sindi Abreu MD   Hosp Day # 10 PCP Rosario Rebolledo DO     Subjective:  Code support was called again Radiology   Data Registry) which includes the Dose Index Registry. PATIENT STATED HISTORY: (As transcribed by Technologist)  Patient is having left upper quadrant pain, after having his spleen removed.          FINDINGS:    LIVER:  Diffuse low attenuati pleural effusions with atelectatic changes. .    OTHER:  Study was performed without contrast, the patient is high in the infiltrated, this as well as the notable motion limits evaluation of the study.      =====  CONCLUSION:    1.  The patient has undergone 04/03/2018       Lab Results  Component Value Date   INR 1.10 03/28/2018   INR 1.28 (H) 03/27/2018   INR 1.43 (H) 03/26/2018       I/O last 3 completed shifts: In: 6862.4 [P.O.:1620;  I.V.:4442.4; IV PIGGYBACK:800]  Out: 9756 [Urine:2170; Drains:93]  I/O t Acute on chronic pancreatitis (HCC)     Pseudocyst of pancreas     Lethargy     Acute respiratory failure with hypoxia (HCC)     Shock (HCC)     Acute abdomen     Fever     Acute delirium     Psychosis    POD 9 splenectomy   No current fevers  Reviewed CT:

## 2018-04-03 NOTE — OCCUPATIONAL THERAPY NOTE
Chart reviewed. Another code support called this morning. Spoke with nursing, Ruthann Bryant. Pt is not appropriate due to safety concerns, and his cognitive issues, at this time. Will currently sign off the case.   Please reorder when pt is able and appropriate

## 2018-04-03 NOTE — PLAN OF CARE
CONFUSION    • Confusion, delirium, dementia or psychosis is improved or at baseline Not Progressing          GASTROINTESTINAL - ADULT    • Maintains or returns to baseline bowel function Progressing          METABOLIC/FLUID AND ELECTROLYTES - ADULT    • G

## 2018-04-04 NOTE — PROGRESS NOTES
[facility]  Progress Note    Lisa Bruno Patient Status:  Inpatient    1970 MRN [id number]   [facility] 4SW-A Attending Florian White MD   [facility] Day # 11 PCP Le Eugene DO     Subjective:  Patient somnolent, on precedex External hemorrhoids without mention of complication     Alcoholic cirrhosis of liver ([address]     Essential hypertension     Medically noncompliant     Alcohol dependence ([address]     Alcohol withdrawal syndrome, with delirium ([address]     Hypomagnesemia     Anxie oral medications - consider advancing to soft tomorrow if weaning successfully   3. Abx per ID, appreciate assistance - WBC elevated today, last CT abd on 3/30 does not show any suspicion for abscess  4. Drain care and monitoring   5.  Psych following for a

## 2018-04-04 NOTE — PLAN OF CARE
CONFUSION    • Confusion, delirium, dementia or psychosis is improved or at baseline Progressing        DISCHARGE PLANNING    • Discharge to home or other facility with appropriate resources Progressing        GASTROINTESTINAL - ADULT    • Maintains or ret

## 2018-04-04 NOTE — PROGRESS NOTES
[address] [place] [address] Patient Status:  Inpatient    1970 MRN [id number]   [facility] 4SW-A Attending Jayne Stokes MD   [facility] Day # 6 PCP Le Nuñez DO     Pulm / Critical Care Progress Note     S: on precedex gtt, (70.6 kg)  03/20/18 : 155 lb (70.3 kg)  03/16/18 : 150 lb (68 kg)       I/O last 3 completed shifts: In: 19056.4 [P.O.:2160; I.V.:8205.4; IV PIGGYBACK:1750]  Out: 0232 [BVRJZ:1727; Drains:178]  I/O this shift:  In: 2642.8 [P.O.:1120;  I.V.:922.8; IV PIGGYB residual hemorrhage vs early abscess (less likely). Per surgery  7. Proph - SCDs  8.  Dispo - full code  -ICU until off precedex reliably  -d/w his aunt  -d/w wife over the phone       Iain Valadez M.D.  [facility] pulmonary / critical care  4/4/2018  7:44 AM

## 2018-04-04 NOTE — PROGRESS NOTES
[facility]  Progress Note     Deysi Sandra Patient Status:  Inpatient    1970 MRN [id number]   [facility] 4SW-A Attending Elham Deshpande MD   Hosp Day # 6 PCP Renée Grayson DO     Chief Complaint: abdominal pain    S:  M Oral Nightly   • piperacillin-tazobactam  3.375 g Intravenous Q8H   • multivitamin  1 tablet Oral Daily   • Thiamine HCl  100 mg Oral Daily   • folic acid  1 mg Oral Daily   • Sertraline HCl  50 mg Oral Daily   • famoTIDine  20 mg Intravenous BID   • lisin splenectomy  2.  ID following     PLAN   WBC 17.7   Replace MG   Encourage po intake   Calmer today, following conversation, weaning precedex drip, wean restraints as able, follow WBC, abx per ID   CHRISTINE drainage less     Quality:  · DVT Prophylaxis: SCDS  · C this time        Osei Benson MD  4/4/2018

## 2018-04-04 NOTE — PROGRESS NOTES
[facility]                INFECTIOUS DISEASE PROGRESS NOTE    Liam Armenta Patient Status:  Inpatient    1970 MRN [id number]   [facility] 4SW-A Attending Anayeli Guardado MD   [address] Day # 6 PCP DO Alonso Elizabeth Updated: 03/26/18 2137   Specimen: Other from Nares     Mrsa Culture No MRSA Isolated   Blood Culture Once [[id number]] Collected: 03/28/18 1709   Order Status: Completed Lab Status: Final result Updated: 04/02/18 1800   Specimen: Blood from Bld,Cordis line complication status     Benign essential HTN     Transaminitis     Alcoholic hepatitis without ascites     Severe alcohol use disorder (HCC)     Anemia     Ileus (HCC)     Other acute appendicitis     Anxiety disorder     Type 2 diabetes mellitus with comp

## 2018-04-04 NOTE — PLAN OF CARE
CONFUSION    • Confusion, delirium, dementia or psychosis is improved or at baseline Progressing          NEUROLOGICAL - ADULT    • Absence of seizures Progressing          GASTROINTESTINAL - ADULT    • Maintains or returns to baseline bowel function Progr

## 2018-04-04 NOTE — PAYOR COMM NOTE
4/3    CONTINUED STAY    REMAINS IN ICU      Awake, confused       Temp:  [97.8 °F (36.6 °C)-98.2 °F (36.8 °C)] 98.2 °F (36.8 °C)  Pulse:  [68-82] 75  Resp:  [12-25] 20  BP: ()/() 149/96  Arouses to stimula, disoriented  Vital signs:Temp:  [97. abx

## 2018-04-05 NOTE — PROGRESS NOTES
[facility]  Progress Note    Lyle Chaparro Patient Status:  Inpatient    1970 MRN [id number]   [facility] 4SW-A Attending Vipul Sprague MD   Hosp Day # 12 PCP Le Eugene DO     Subjective:  Patient off sedation, no furth to unspecified cause     External hemorrhoids without mention of complication     Alcoholic cirrhosis of liver ([facility]     Essential hypertension     Medically noncompliant     Alcohol dependence ([facility]     Alcohol withdrawal syndrome, with delirium ([facility] assistance  4. Drain care and monitoring - will likely remove both drains and staples prior to discharge  5. GI/DVT prophylaxis   6. No family at bedside    My total face time with this patient was 25 minutes.   Greater than half of our visit was spent in r

## 2018-04-05 NOTE — PROGRESS NOTES
[facility]  Report of Psychiatric Progress Note    Date of Admission: 3/24/18  Date of Service: 4/4/18  Reason for Consultation: Agitation, hallucination     Impression: His delirium is improving and he had no agitation last night with the addition of extubated on 3/27. He has been treated for alcohol withdrawal with librium and CIWA with Ativan. Psych consulted because of his nighttime agitation requiring 3 code supports.  He admits to feeling scared and thinking that someone was trying to kill him last worked since 2/2018.      Family Hx: Both parents had alcohol use disorder    Past Medical History:   Diagnosis Date   • Back problem    • Depression    • Diabetes ([facility]    • ETOH abuse    • High blood pressure    • Scoliosis    • Seizure disorder ([facility] UNIT/ML flexpen 1-10 Units, 1-10 Units, Subcutaneous, TID CC and HS  •  hydrALAzine HCl (APRESOLINE) injection 10 mg, 10 mg, Intravenous, Q4H PRN  •  [START ON 4/10/2018] pneumococcal 13-Lyudmila conj vacc (PREVNAR 13) injection 0.5 mL, 0.5 mL, Intramuscular, P Intact remote    Insight: limited  Judgment: limited

## 2018-04-05 NOTE — PLAN OF CARE
Safety Risk - Non-Violent Restraints    • Patient will remain free from self-harm Completed          CONFUSION    • Confusion, delirium, dementia or psychosis is improved or at baseline Progressing        GASTROINTESTINAL - ADULT    • Maintains or returns

## 2018-04-05 NOTE — PLAN OF CARE
Pt alert/oriented x4, forgetful at times. Delirium negative. Hemodynamically stable. Transfer orders in place for CTU3 pending bed availability. See further assessment as documented, will continue to monitor.        CONFUSION    • Confusion, delirium, d

## 2018-04-05 NOTE — PROGRESS NOTES
[facility]  Report of Psychiatric Progress Note    Date of Admission: 3/24/18  Date of Service: 4/5/18  Reason for Consultation: Agitation, hallucination     Impression: His agitation has resolved and delirium improved; much more attentive and Margie Courtney ruptured spleen. He was taken to the OR and had a splenectomy and an estimated blood loss of 2.6L. His Hb was 6 at the time. He has been recovering in the ICU and was extubated on 3/27.  He has been treated for alcohol withdrawal with librium and CIWA with [facility] and was sober for 2 wks afterwards. He drinks to deal with anxiety and stress. His longest period sober the past year when not in the hospital is 3 days.      Social Hx: He grew up in the [facility].  His parents both had a 25 mg, 25 mg, Oral, Q4H PRN  •  lisinopril (PRINIVIL,ZESTRIL) tab 20 mg, 20 mg, Oral, Daily  •  Metoprolol Tartrate (LOPRESSOR) tab 50 mg, 50 mg, Oral, 2x Daily(Beta Blocker)  •  HYDROcodone-acetaminophen (NORCO)  MG per tab 1 tablet, 1 tablet, Oral, Pulse: 83   Resp: 22   Temp:      Appearance: fair grooming  Behavior: no psychomotor agitation  Attitude: cooperative    Speech: normal rate and rhythm and soft    Mood: \"better\", less anxious and depressed  Affect: calm, congruent    Thought process:

## 2018-04-05 NOTE — PROGRESS NOTES
Jake Ford Patient Status:  Inpatient    1970 MRN [id number]   [facility] 4SW-A Attending Becky Nash MD   Hosp Day # 15 PCP Rupa Paulino DO     Critical Care Progress Note    Assessment/Plan:  1.  Acute blood loss anemia (36.7 °C) (Temporal)   Resp 12   Ht 5' 6\" (1.676 m)   Wt 154 lb 1.6 oz (69.9 kg)   SpO2 100%   BMI 24.87 kg/m²   Physical Exam:   General: alert, cooperative, AOx3   HEENT: lips, mucosa, tongue normal. Mallampati II   Lungs: Clear to auscultation bilatera

## 2018-04-05 NOTE — PLAN OF CARE
CONFUSION    • Confusion, delirium, dementia or psychosis is improved or at baseline Progressing        GASTROINTESTINAL - ADULT    • Maintains or returns to baseline bowel function Progressing        No untoward events during the night, no delirium noted,

## 2018-04-05 NOTE — PROGRESS NOTES
[facility]                INFECTIOUS DISEASE PROGRESS NOTE    Bruno Ferguson Patient Status:  Inpatient    1970 MRN [id number]   [facility] 4SW-A Attending Mercy Clark MD   [facility] Day # 15 PCP DO Alonso Elizabeth VANCT  Microbiology      No new micro    CT: 3/30/18: collection, with drain in place  cxr 4/2 revieed    Problem list reviewed:  Patient Active Problem List:     Insomnia, unspecified     Unequal leg length (acquired)     Contact dermatitis and other ecze delirium     Psychosis      ASSESSMENT/PLAN:  1. 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

## 2018-04-05 NOTE — PROGRESS NOTES
[facility]  Progress Note     Joe García Patient Status:  Inpatient    1970 MRN [id number]   [facility] 4SW-A Attending Addi Smith MD   Hosp Day # 15 PCP China Mccallum DO     Chief Complaint: abdominal pain    S:  H Blocker)   • melatonin  2 mg Oral Nightly   • Insulin Aspart Pen  1-10 Units Subcutaneous TID CC and HS   • docusate sodium  100 mg Oral BID       ASSESSMENT / PLAN:     1. Hemorrhagic shock- resolved    1. Due to splenic rupture  2. Surgery following  3. today- adv diet if ok w/ surgery, up to chair, pt therapy to see     Quality:  · DVT Prophylaxis: SCDS  · CODE status: full  · Kilpatrick: no  · Central line: yes RIJ     Estimated date of discharge: TBD  Discharge is dependent on: progress  At this point Mr. Paulino Eldridge colace[last dose received was on 3/30/2018 8 .41 am].  Stool c diff pending        Gualberto Narvaez MD  4/5/2018

## 2018-04-06 NOTE — PROGRESS NOTES
[facility]  Progress Note     Levell Sheboygan Patient Status:  Inpatient    1970 MRN [id number]   [facility] 4SW-A Attending Son Olson MD   Hosp Day # 15 PCP Rosario Rebolledo DO     Chief Complaint: abdominal pain    S: Oral Daily   • Thiamine HCl  100 mg Oral Daily   • folic acid  1 mg Oral Daily   • Sertraline HCl  50 mg Oral Daily   • famoTIDine  20 mg Intravenous BID   • lisinopril  20 mg Oral Daily   • metoprolol tartrate  50 mg Oral 2x Daily(Beta Blocker)   • mercedez cont to rise   966K   Cause? WBC 17.3   Cbc am   Tx to CTU 3   Quality:  · DVT Prophylaxis: SCDS  · CODE status: full  · Kilpatrick: no  · Central line: yes RIJ     Estimated date of discharge: TBD  Discharge is dependent on: progress  At this point Yamini  Nic splenectomy- possibly reactive.   if persist then will also consult hemonc  Hypomagnesemia–replace  Hyponatremia–follow BMP in mable Dillard MD  4/6/2018

## 2018-04-06 NOTE — PROGRESS NOTES
[facility]  Report of Psychiatric Progress Note    Date of Admission: 3/24/18  Date of Service: 4/6/18  Reason for Consultation: Agitation, hallucination     Impression: His delirium and agitation have resolved.  He is attentive and organized in thinki people in the room and per staff report hallucinating and paranoid. He is drowsy at this time and keeps falling asleep but arouses to voice to answer my questions. He is only oriented to self and thinks I am his marriage counselor.  He feels tired and depre    Social Hx: He grew up in the [facility]. His parents both had alcohol use disorder. He was raised by his grandparents. 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 Mamta Galaviz Antacid (TUMS) chewable tab 500 mg, 500 mg, Oral, TID PRN  •  melatonin tab TABS 2 mg, 2 mg, Oral, Nightly  •  Insulin Aspart Pen (NOVOLOG) 100 UNIT/ML flexpen 1-10 Units, 1-10 Units, Subcutaneous, TID CC and HS  •  hydrALAzine HCl (APRESOLINE) injection 1 ideation  Perceptions: NO visual hallucinations  Associations: Intact    Orientation: self, place, situation  Attention and Concentration: fair today  Memory: Intact remote    Insight: fair today  Judgment: fair today      Lab Results  Component Value Date

## 2018-04-06 NOTE — PLAN OF CARE
ANXIETY    • Will report anxiety at manageable levels Progressing        CONFUSION    • Confusion, delirium, dementia or psychosis is improved or at baseline Progressing        GASTROINTESTINAL - ADULT    • Minimal or absence of nausea and vomiting Progres

## 2018-04-06 NOTE — PROGRESS NOTES
[facility]                INFECTIOUS DISEASE PROGRESS NOTE    Nalini Vann Patient Status:  Inpatient    1970 MRN [id number]   [facility] 4SW-A Attending Mesha Kendrick MD   [facility] Day # 15 PCP DO Alonso Elizabeth new micro    CT: 3/30/18: collection, with drain in place  cxr 4/2 revieed    Problem list reviewed:  Patient Active Problem List:     Insomnia, unspecified     Unequal leg length (acquired)     Contact dermatitis and other eczema, due to unspecified cause Psychosis      ASSESSMENT/PLAN:  1. SP splenectomy 3/26/18 for rupture/intra-abdominal bleed  CHRISTINE drain in place/CT showing collection - cw hematoma, but at risk for developing 2nd infection/abscess  -surgery following  Leukocytosis/fever, trending down on

## 2018-04-06 NOTE — PLAN OF CARE
ANXIETY    • Will report anxiety at manageable levels Progressing        CONFUSION    • Confusion, delirium, dementia or psychosis is improved or at baseline Progressing        Received report from previous RN, during report charge RN relayed patient is ha

## 2018-04-06 NOTE — PHYSICAL THERAPY NOTE
PHYSICAL THERAPY QUICK EVALUATION - INPATIENT    Room Number: 465/465-A  Evaluation Date: 4/6/2018  Presenting Problem: ETOH withdrawal, pancreatitis, s/p exp lap on 3/25/18  Physician Order: PT Eval and Treat    History Related to Current Admission  Pt High fall risk    WEIGHT BEARING RESTRICTION  Weight Bearing Restriction: None                PAIN ASSESSMENT  Ratin  Location: Headache  Management Techniques:  Activity promotion;Repositioning (Rn had just given pt pain meds)   RANGE OF MOTION AND STR independence. Gait deviation due to LL difference, r le shorter than L - lateral sway to the right, walks on r toes. Pt completed Modified Torres balance scale - see results below. Modified Torres Balance Test    1.  Sitting to standing

## 2018-04-06 NOTE — CM/SW NOTE
Pt has transfer out of ICU orders. Called to [facility] to make aware so they can investigate network hospital transfer options for anticipated need ongoing medical care.    Daniel Celis, 04/06/18, 12:51 PM

## 2018-04-06 NOTE — PROGRESS NOTES
[facility]  Progress Note    Rafiq Mary Patient Status:  Inpatient    1970 MRN [id number]   [facility] 4SW-A Attending Caro John MD   Hosp Day # 15 PCP Le Eugene DO     Subjective:  Patient off sedation, no furth Alcoholic cirrhosis of liver ([facility]     Essential hypertension     Medically noncompliant     Alcohol dependence ([facility]     Alcohol withdrawal syndrome, with delirium ([facility] [facility]     Hypomagnesemia     Anxiety     Depressive disorder     Alcohol abuse     Type 2 remove both drains and staples prior to discharge  5. GI/DVT prophylaxis   6. No family at bedside    My total face time with this patient was 25 minutes.   Greater than half of our visit was spent in review of the chart and discussion with the nurse regard

## 2018-04-06 NOTE — PLAN OF CARE
CONFUSION    • Confusion, delirium, dementia or psychosis is improved or at baseline Completed          ANXIETY    • Will report anxiety at manageable levels Progressing          PAIN - ADULT    • Verbalizes/displays adequate comfort level or patient's sta

## 2018-04-06 NOTE — PLAN OF CARE
ANXIETY    • Will report anxiety at manageable levels Progressing        GASTROINTESTINAL - ADULT    • Minimal or absence of nausea and vomiting Progressing        PAIN - ADULT    • Verbalizes/displays adequate comfort level or patient's stated pain goal P

## 2018-04-06 NOTE — PROGRESS NOTES
Pulmonary Progress Note        NAME: Cassandra Field - ROOM: 465/465-A - MRN: [id number] - Age: 57year old - : 1970        SUBJECTIVE: Low grade temp overnight- room was reportedly warm and pt was under a few blankets, no complaints this AM    O 84.1   .0* 919.0* 985.0*   INR  --  1.06  --          Recent Labs   04/04/18  0428 04/04/18  1657 04/05/18  0611 04/06/18  0438     --  137 135*   K 3.6  3.6 4.8 3.9 3.8     --  102 99*   CO2 23.0  --  25.0 27.0   BUN 3*  --  4* 2*   CA

## 2018-04-06 NOTE — BH PROGRESS NOTE
Went to see the pt per Dr. Jan Stevens and gave him a referral to f/u with a psychiatrist.  His nurse, Evelyne Cockayne is aware of the plan.

## 2018-04-07 PROBLEM — S36.09XA SPLENIC RUPTURE: Status: ACTIVE | Noted: 2018-04-07

## 2018-04-07 NOTE — PROGRESS NOTES
[facility]  Progress Note    Merline Thomason Patient Status:  Inpatient    1970 MRN [id number]   [facility] 3NE-A Attending Chet Warren MD   [facility] Day # 14 PCP Le Herzog DO     Subjective:  Patient is alert and oriented a conversational  The patient denies any significant abdominal pain.   No nausea or vomiting  WBC mildly elevated at 15-continue to monitor-low-grade temp of 99.5  Continue diet as tolerated  Post splenectomy immunization prior to discharge    Octaviano Mitchell,

## 2018-04-07 NOTE — PLAN OF CARE
ANXIETY     • Will report anxiety at manageable levels Progressing           DISCHARGE PLANNING     • Discharge to home or other facility with appropriate resources Progressing           GASTROINTESTINAL - ADULT     • Minimal or absence of nausea and vomit

## 2018-04-07 NOTE — PROGRESS NOTES
[facility]  Progress Note    Ori Flanagan Patient Status:  Inpatient    1970 MRN [id number]   [facility] 3NE-A Attending Elvin Driver MD   Hosp Day # 15 PCP Le Eugene DO     CC: Abdominal pain    SUBJECTIVE:  Denies an CL 97 04/07/2018   CO2 27.0 04/07/2018    04/07/2018   CA 9.0 04/07/2018   MG 1.7 04/07/2018   PGLU 158 04/07/2018         Meds:     Current Facility-Administered Medications:  famoTIDine (PEPCID) tab 20 mg 20 mg Oral BID   AmLODIPine Besylate (NO ENEMA (FLEET) 7-19 GM/118ML enema 133 mL 1 enema Rectal Once PRN   metoprolol Tartrate (LOPRESSOR) 5 MG/5ML injection 5 mg 5 mg Intravenous Q6H PRN   ondansetron HCl (ZOFRAN) injection 4 mg 4 mg Intravenous Q4H PRN   glucose (DEX4) oral liquid 15 g 15 g Or Home        Questions/concerns and Plan of care were discussed with patient and RN by bedside.             Tory Munguia MD  4/7/2018  11:30 AM

## 2018-04-07 NOTE — PROGRESS NOTES
[facility]  Report of Psychiatric Progress Note    Date of Admission: 3/24/18  Date of Service: 4/7/18  Reason for Consultation: Agitation, hallucination     Impression: His delirium and agitation have resolved.  He is attentive and organized in thinki feeling scared and thinking that someone was trying to kill him last night. He was seeing people in the room and per staff report hallucinating and paranoid. He is drowsy at this time and keeps falling asleep but arouses to voice to answer my questions.  He past 20 yrs was 45 days several yrs ago. He went to the 30 day residential program at [facility] and was sober for 2 wks afterwards. He drinks to deal with anxiety and stress.  His longest period sober the past year when not in the hospital is diphenhydrAMINE (BENADRYL) cap/tab 25 mg, 25 mg, Oral, Q4H PRN  •  lisinopril (PRINIVIL,ZESTRIL) tab 20 mg, 20 mg, Oral, Daily  •  Metoprolol Tartrate (LOPRESSOR) tab 50 mg, 50 mg, Oral, 2x Daily(Beta Blocker)  •  HYDROcodone-acetaminophen ([facility])  M 04/07/18  0812   BP:    Pulse:    Resp:    Temp: 99.5 °F (37.5 °C)     Appearance: fair grooming  Behavior: no psychomotor agitation  Attitude: cooperative    Speech: normal rate and rhythm and soft    Mood: tired, depressed  Affect: calm, congruent    Tho

## 2018-04-07 NOTE — PLAN OF CARE
ANXIETY    • Will report anxiety at manageable levels Progressing        GASTROINTESTINAL - ADULT    • Minimal or absence of nausea and vomiting Progressing        METABOLIC/FLUID AND ELECTROLYTES - ADULT    • Glucose maintained within prescribed range Pro

## 2018-04-08 NOTE — PLAN OF CARE
This RN assumed care of patient at 0000. Call light within reach, pt sleeping, no complaints voiced. WCTM. Pt A+Ox4. Seizure precautions in place and maintained. WNL on RA. Tele monitoring in place and maintained. Tele shows NSR.   PRN Hydralazine a

## 2018-04-08 NOTE — PROGRESS NOTES
[facility]  Progress Note    Deysi Sandra Patient Status:  Inpatient    1970 MRN [id number]   [facility] 3NE-A Attending Elham Deshpande MD   Hosp Day # 13 PCP Le Eugene DO     CC: Abdominal pain    SUBJECTIVE:  Denies an Meds:     Current Facility-Administered Medications:  ALPRAZolam (XANAX) tab 0.25 mg 0.25 mg Oral BID PRN   aspirin EC tab 81 mg 81 mg Oral Daily   famoTIDine (PEPCID) tab 20 mg 20 mg Oral BID   AmLODIPine Besylate (NORVASC) tab 10 mg 10 mg Oral Da ondansetron HCl (ZOFRAN) injection 4 mg 4 mg Intravenous Q4H PRN   glucose (DEX4) oral liquid 15 g 15 g Oral Q15 Min PRN   Or      Glucose-Vitamin C (DEX-4) 4-0.006 g chewable tab 4 tablet 4 tablet Oral Q15 Min PRN   Or      dextrose 50% injection 50 mL with regular outpatient primary care physician within 1 week in office. Follow-up with surgeon as directed in 1 week for postsurgical follow-up and staple removal.  Follow-up with hematology as directed for weekly blood counts.         Bita Peterson MD  4/

## 2018-04-08 NOTE — DISCHARGE SUMMARY
[facility]  Discharge Summary    Lisa Bruno Patient Status:  Inpatient    1970 MRN [id number]   [facility] 3NE-A Attending No att. providers found   Hosp Day # 15 PCP [place], DO     Date of Admission: 3/24/2018    Kamron admitted here every month for the past 4 months. He was recently discharged with similar symptoms. Patient had an episode of emesis but denies hematemesis. He denies diarrhea, jaundice or icterus. He denies fevers or chills.   He denies chest pain or sh status post exploratory laparotomy and evacuation of intra-abdominal hemorrhage and splenectomy by surgery Dr. Rusty Butler and Nichole Lam. Hemorrhagic shock on admission due to splenic rupture, transfused with PRBC and platelets and fresh frozen plasma. Hemorrhagic sh increased risk due to recurrent pancreatitis which can increase intra-abdominal thrombosis. Indeed, Dr. 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 Hemonc Dr Paul Buenrostro recommend enteric coated aspirin 81 mg while his platelet c with acute abdomen status post exploratory laparotomy and evacuation of intra-abdominal hemorrhage and splenectomy by surgery Dr. Moira Mccormack and Aedla Cummins ON 3/25/2018 night into early morning 3/26/2018 12:37 AM    Incidental or significant findings and recommendat Quantity:  30 tablet  Refills:  2     hydrochlorothiazide 25 MG Tabs  Commonly known as:  HYDRODIURIL      Take 1 tablet (25 mg total) by mouth daily.    Quantity:  30 tablet  Refills:  0     HydrOXYzine HCl 50 MG Tabs  Commonly known as:  ATARAX      Take Medications      Please  your prescriptions at the location directed by your doctor or nurse    Bring a paper prescription for each of these medications  · aspirin 81 MG Tbec  · divalproex Sodium  MG Tb24  · QUEtiapine Fumarate 100 MG Tabs  ·

## 2018-04-08 NOTE — PROGRESS NOTES
[facility]                INFECTIOUS DISEASE PROGRESS NOTE    [facility] Patient Status:  Inpatient    1970 MRN [id number]   [facility] 4SW-A Attending Dev Sheppard MD   [address] Day # 13 PCP DO Ellen Nava Alcohol abuse     Type 2 diabetes mellitus with hyperglycemia (HCC)     Thrombocytopenia (HCC)     Acute kidney injury ([address]     Metabolic acidosis     Alcohol withdrawal seizure, uncomplicated (HCC)     LFT elevation     Seizure ([address]     Alcohol withdraw

## 2018-04-08 NOTE — PROGRESS NOTES
[facility] Report of Consultation      Patient Name: Airam Owens   YOB: 1970  Medical Record Number: [id number]  Consulting Physician: Patria Corley. Iker Edwards M.D.    Referring Physician: Natalie Santa MD    Date of Co leg  2002: VASECTOMY      Family History   Family History   Problem Relation Age of Onset   • Heart Attack Father 50   • Thyroid Disorder Mother          Social History Smoking status: Never Smoker Once   [COMPLETED] Magnesium Sulfate IVPB premix SOLN 2 g 2 g Intravenous Once   Sertraline HCl (ZOLOFT) tab 50 mg 50 mg Oral Daily   [COMPLETED] magnesium oxide (MAG-OX) tab 400 mg 400 mg Oral Once   DiphenhydrAMINE HCl (BENADRYL) injection 12.5 mg 12.5 m 650 mg Rectal Q6H PRN   PEG 3350 (MIRALAX) powder packet 17 g 17 g Oral Daily PRN   magnesium hydroxide (MILK OF MAGNESIA) 400 MG/5ML suspension 30 mL 30 mL Oral Daily PRN   bisacodyl (DULCOLAX) rectal suppository 10 mg 10 mg Rectal Daily PRN   FLEET ENEMA (DEX4) oral liquid 30 g 30 g Oral Q15 Min PRN   Or      Glucose-Vitamin C (DEX-4) 4-0.006 g chewable tab 8 tablet 8 tablet Oral Q15 Min PRN       Allergies   Mr. Ceja Cheek is allergic to dilantin; morphine; and norco [hydrocodone-acetaminophen].        Review verbalizes understanding of our discussions today.     Laboratory     Recent Results (from the past 24 hour(s))  -POCT GLUCOSE   Collection Time: 18  5:11 PM   Result Value Ref Range   POC Glucose 245 (H) 65 - 99 mg/dL   -POCT GLUCOSE   Collection Murrel Apgar pancreas. There is a trace amount of fluid adjacent to the tail of the pancreas.   There is a 10 x 8 mm hypo enhancing focus arising from the tail of the pancreas and an 11 x 9 mm hypo enhancing   focus arising from the junction of the head and body of the and monocytosis is common after splenectomy. This can be chronic findings and do not require any intervention. 2.   Thrombocytosis: Reactive thrombocytosis after splenectomy is common.  It usually peaks within 3 weeks and then falls over the subsequent [address] [facility]

## 2018-04-08 NOTE — PLAN OF CARE
NURSING DISCHARGE NOTE    Discharged Home via Wheelchair.   Accompanied by Support staff  Belongings Taken by patient/family     Discharge papers, prescriptions, given and explained

## 2018-04-09 ENCOUNTER — PATIENT OUTREACH (OUTPATIENT)
Dept: CASE MANAGEMENT | Age: 48
End: 2018-04-09

## 2018-04-09 NOTE — BH PROGRESS NOTE
Talked with the psychiatrist, Dr. Ching Resendez. He stated the pt lost the referral that was given to him by this liaison. This was placed in his d/c folder but for some reason the pt didn't have it. He is already d/c so called him at home.   He didn't answer so

## 2018-04-10 NOTE — PROGRESS NOTES
Discharge date: 4/8/18    Discharge DX:    General:  How have you been since your discharge from the hospital: Patient reports that everything is getting better day by day.  Patient denies fever, no redness, swelling, drainage or separation of wound edges,

## 2018-04-10 NOTE — PROGRESS NOTES
[facility]  Progress Note    Rafiq Mary Patient Status:  Inpatient    1970 MRN [id number]   [facility] 3NE-A Attending No att. providers found   Hosp Day # 15 PCP Le Eugene DO     Late entry  Subjective:  Patient is fe

## 2018-04-11 ENCOUNTER — TELEPHONE (OUTPATIENT)
Dept: HEMATOLOGY/ONCOLOGY | Facility: HOSPITAL | Age: 48
End: 2018-04-11

## 2018-04-11 ENCOUNTER — LAB ENCOUNTER (OUTPATIENT)
Dept: LAB | Facility: HOSPITAL | Age: 48
End: 2018-04-11
Attending: SPECIALIST
Payer: MEDICAID

## 2018-04-11 DIAGNOSIS — D75.839 THROMBOCYTOSIS: ICD-10-CM

## 2018-04-11 PROCEDURE — 85025 COMPLETE CBC W/AUTO DIFF WBC: CPT

## 2018-04-11 PROCEDURE — 36415 COLL VENOUS BLD VENIPUNCTURE: CPT

## 2018-04-11 NOTE — PAYOR COMM NOTE
--------------  DISCHARGE REVIEW    Payor: [address] #:  [id number]  Authorization Number: N/A    Admit date: 3/24/18  Admit time:  2116  Discharge Date: 4/8/2018  4:43 PM     Admitting Physician: Ernestina Reinoso MD  Attending Physic

## 2018-04-11 NOTE — PAYOR COMM NOTE
--------------  CONTINUED STAY REVIEW    Payor: [employer] #:  [id number]  Authorization Number: N/A    Admit date: 3/24/18  Admit time: 2116    Admitting Physician: Nery Loaiza MD  Attending Physician:  No att. providers found  P or vomiting  WBC mildly elevated at 15-continue to monitor-low-grade temp of 99.5  Continue diet as tolerated  Post splenectomy immunization prior to discharge  4/8  Leukocytosis: Neutrophilia, lymphocytosis, and monocytosis is common after splenectomy.  Familia Damon

## 2018-04-17 ENCOUNTER — OFFICE VISIT (OUTPATIENT)
Dept: SURGERY | Facility: CLINIC | Age: 48
End: 2018-04-17

## 2018-04-17 VITALS
BODY MASS INDEX: 23.03 KG/M2 | TEMPERATURE: 99 F | HEIGHT: 66.5 IN | SYSTOLIC BLOOD PRESSURE: 113 MMHG | DIASTOLIC BLOOD PRESSURE: 74 MMHG | WEIGHT: 145 LBS

## 2018-04-17 DIAGNOSIS — K70.10 ALCOHOLIC HEPATITIS WITHOUT ASCITES: ICD-10-CM

## 2018-04-17 DIAGNOSIS — S36.09XA SPLENIC RUPTURE: Primary | ICD-10-CM

## 2018-04-17 DIAGNOSIS — D75.838 THROMBOCYTOSIS AFTER SPLENECTOMY: ICD-10-CM

## 2018-04-17 DIAGNOSIS — D62 ACUTE BLOOD LOSS ANEMIA: ICD-10-CM

## 2018-04-17 DIAGNOSIS — F10.10 ALCOHOL ABUSE: ICD-10-CM

## 2018-04-17 DIAGNOSIS — K85.20 ALCOHOL-INDUCED ACUTE PANCREATITIS, UNSPECIFIED COMPLICATION STATUS: ICD-10-CM

## 2018-04-17 DIAGNOSIS — Z90.81 THROMBOCYTOSIS AFTER SPLENECTOMY: ICD-10-CM

## 2018-04-17 PROCEDURE — 99024 POSTOP FOLLOW-UP VISIT: CPT | Performed by: COLON & RECTAL SURGERY

## 2018-04-17 RX ORDER — PEN NEEDLE, DIABETIC 31 GX5/16"
NEEDLE, DISPOSABLE MISCELLANEOUS
Refills: 6 | COMMUNITY
Start: 2018-03-25 | End: 2018-05-24

## 2018-04-17 NOTE — PROGRESS NOTES
Post Operative Visit Note       Active Problems  1. Splenic rupture    2. Alcohol-induced acute pancreatitis, unspecified complication status    3. Alcoholic hepatitis without ascites    4. Alcohol abuse    5. 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  Acute b Past Surgical History:  1978: OTHER SURGICAL HISTORY      Comment: lengthened right leg  2002: VASECTOMY    The family history and social history have been reviewed by me today.     Family History   Problem Relation Age of Onset   • Heart Attack Father Tab Take 1 tablet (50 mg total) by mouth 2 (two) times daily. Disp: 60 tablet Rfl: 0   hydrochlorothiazide 25 MG Oral Tab Take 1 tablet (25 mg total) by mouth daily.  Disp: 30 tablet Rfl: 0   Enalapril Maleate 20 MG Oral Tab Take 1 tablet (20 mg total) by m swelling. Gastrointestinal: Negative for abdominal distention, abdominal pain, anal bleeding, blood in stool, constipation, diarrhea, nausea and vomiting. Genitourinary: Negative for difficulty urinating, dysuria, frequency and urgency.    Musculoskelet after splenectomy  Acute blood loss anemia      Plan   The patient is recovering well following surgery. We reviewed his procedure, pathology, and appropriate aftercare in detail at today's visit.     Pathology demonstrates spleen with intraparenchymal hem or fail to improve.     JENNY Gaston

## 2018-04-18 ENCOUNTER — LAB ENCOUNTER (OUTPATIENT)
Dept: LAB | Facility: HOSPITAL | Age: 48
End: 2018-04-18
Attending: SPECIALIST
Payer: MEDICAID

## 2018-04-18 DIAGNOSIS — D69.6 THROMBOCYTOPENIA (HCC): ICD-10-CM

## 2018-04-18 DIAGNOSIS — E83.42 HYPOMAGNESEMIA: ICD-10-CM

## 2018-04-18 DIAGNOSIS — D75.839 THROMBOCYTOSIS: ICD-10-CM

## 2018-04-18 DIAGNOSIS — R79.0 LOW MAGNESIUM LEVEL: Primary | ICD-10-CM

## 2018-04-18 DIAGNOSIS — D53.9 ANEMIA ASSOCIATED WITH NUTRITIONAL DEFICIENCY: ICD-10-CM

## 2018-04-18 DIAGNOSIS — F10.20 ALCOHOLIC (HCC): ICD-10-CM

## 2018-04-18 DIAGNOSIS — E11.65 TYPE 2 DIABETES MELLITUS WITH HYPERGLYCEMIA, UNSPECIFIED WHETHER LONG TERM INSULIN USE (HCC): ICD-10-CM

## 2018-04-18 DIAGNOSIS — Z00.00 LABORATORY EXAM ORDERED AS PART OF ROUTINE GENERAL MEDICAL EXAMINATION: ICD-10-CM

## 2018-04-18 PROCEDURE — 82306 VITAMIN D 25 HYDROXY: CPT

## 2018-04-18 PROCEDURE — 83036 HEMOGLOBIN GLYCOSYLATED A1C: CPT

## 2018-04-18 PROCEDURE — 80053 COMPREHEN METABOLIC PANEL: CPT

## 2018-04-18 PROCEDURE — 84443 ASSAY THYROID STIM HORMONE: CPT

## 2018-04-18 PROCEDURE — 84425 ASSAY OF VITAMIN B-1: CPT

## 2018-04-18 PROCEDURE — 80061 LIPID PANEL: CPT

## 2018-04-18 PROCEDURE — 83735 ASSAY OF MAGNESIUM: CPT

## 2018-04-18 PROCEDURE — 84439 ASSAY OF FREE THYROXINE: CPT

## 2018-04-18 PROCEDURE — 36415 COLL VENOUS BLD VENIPUNCTURE: CPT

## 2018-04-18 PROCEDURE — 85025 COMPLETE CBC W/AUTO DIFF WBC: CPT

## 2018-04-18 PROCEDURE — 82607 VITAMIN B-12: CPT

## 2018-04-20 ENCOUNTER — TELEPHONE (OUTPATIENT)
Dept: HEMATOLOGY/ONCOLOGY | Facility: HOSPITAL | Age: 48
End: 2018-04-20

## 2018-04-20 NOTE — TELEPHONE ENCOUNTER
MD Munir Toscano, RN             Can tell patient that platelet count is now 560. He can stop aspirin and doesn't need to get his blood check anymore.

## 2018-04-23 ENCOUNTER — TELEPHONE (OUTPATIENT)
Dept: FAMILY MEDICINE CLINIC | Facility: CLINIC | Age: 48
End: 2018-04-23

## 2018-04-23 NOTE — TELEPHONE ENCOUNTER
See note below stating our ofc/providers no longer in network for pt's new insurance. Lab 4/18/18 result notes had further questions and recommendations-some for otc measures.   Call to pt to discuss lab results and questions from [facility] APN reaches pt-a

## 2018-04-23 NOTE — TELEPHONE ENCOUNTER
FYI: We called patient to cancelas his Medicaid [employer] was denied. Patient now has [facility] is not in network. Patient was advised to contact insurance for another PCP. Please see lab result notes requesting new appointment.

## 2018-04-27 RX ORDER — HYDROXYZINE 50 MG/1
TABLET, FILM COATED ORAL
Qty: 30 TABLET | Refills: 0 | OUTPATIENT
Start: 2018-04-27

## 2018-05-24 ENCOUNTER — HOSPITAL ENCOUNTER (INPATIENT)
Facility: HOSPITAL | Age: 48
LOS: 5 days | Discharge: HOME | DRG: 896 | End: 2018-05-29
Attending: EMERGENCY MEDICINE | Admitting: HOSPITALIST
Payer: MEDICAID

## 2018-05-24 ENCOUNTER — TELEPHONE (OUTPATIENT)
Dept: FAMILY MEDICINE CLINIC | Facility: CLINIC | Age: 48
End: 2018-05-24

## 2018-05-24 DIAGNOSIS — R73.9 HYPERGLYCEMIA: Primary | ICD-10-CM

## 2018-05-24 DIAGNOSIS — I10 UNCONTROLLED HYPERTENSION: ICD-10-CM

## 2018-05-24 DIAGNOSIS — E87.2 ALCOHOLIC KETOACIDOSIS: ICD-10-CM

## 2018-05-24 DIAGNOSIS — N28.9 RENAL INSUFFICIENCY: ICD-10-CM

## 2018-05-24 DIAGNOSIS — F10.239 ALCOHOL WITHDRAWAL SYNDROME WITH COMPLICATION (HCC): ICD-10-CM

## 2018-05-24 DIAGNOSIS — Z91.19 MEDICALLY NONCOMPLIANT: ICD-10-CM

## 2018-05-24 DIAGNOSIS — K85.20 ALCOHOL-INDUCED ACUTE PANCREATITIS, UNSPECIFIED COMPLICATION STATUS: ICD-10-CM

## 2018-05-24 PROBLEM — E87.29 ALCOHOLIC KETOACIDOSIS: Status: ACTIVE | Noted: 2018-05-24

## 2018-05-24 PROBLEM — F10.939 ALCOHOL WITHDRAWAL SYNDROME WITH COMPLICATION (HCC): Status: ACTIVE | Noted: 2018-05-24

## 2018-05-24 PROCEDURE — 99223 1ST HOSP IP/OBS HIGH 75: CPT | Performed by: HOSPITALIST

## 2018-05-24 RX ORDER — INSULIN ASPART 100 [IU]/ML
0.2 INJECTION, SOLUTION INTRAVENOUS; SUBCUTANEOUS ONCE
Status: COMPLETED | OUTPATIENT
Start: 2018-05-24 | End: 2018-05-24

## 2018-05-24 RX ORDER — HYDROMORPHONE HYDROCHLORIDE 1 MG/ML
0.5 INJECTION, SOLUTION INTRAMUSCULAR; INTRAVENOUS; SUBCUTANEOUS EVERY 2 HOUR PRN
Status: DISCONTINUED | OUTPATIENT
Start: 2018-05-24 | End: 2018-05-27

## 2018-05-24 RX ORDER — SODIUM CHLORIDE 9 MG/ML
INJECTION, SOLUTION INTRAVENOUS CONTINUOUS
Status: DISCONTINUED | OUTPATIENT
Start: 2018-05-24 | End: 2018-05-27

## 2018-05-24 RX ORDER — DICYCLOMINE HYDROCHLORIDE 10 MG/1
10 CAPSULE ORAL EVERY 4 HOURS PRN
Status: DISCONTINUED | OUTPATIENT
Start: 2018-05-24 | End: 2018-05-29

## 2018-05-24 RX ORDER — AMLODIPINE BESYLATE 5 MG/1
10 TABLET ORAL DAILY
Status: DISCONTINUED | OUTPATIENT
Start: 2018-05-24 | End: 2018-05-26

## 2018-05-24 RX ORDER — METOPROLOL TARTRATE 50 MG/1
50 TABLET, FILM COATED ORAL
Status: DISCONTINUED | OUTPATIENT
Start: 2018-05-24 | End: 2018-05-26

## 2018-05-24 RX ORDER — LORAZEPAM 2 MG/ML
1 INJECTION INTRAMUSCULAR EVERY 30 MIN PRN
Status: DISCONTINUED | OUTPATIENT
Start: 2018-05-24 | End: 2018-05-27

## 2018-05-24 RX ORDER — MELATONIN
100
Status: DISCONTINUED | OUTPATIENT
Start: 2018-05-24 | End: 2018-05-25

## 2018-05-24 RX ORDER — HYDROXYZINE HYDROCHLORIDE 25 MG/1
50 TABLET, FILM COATED ORAL 3 TIMES DAILY PRN
Status: DISCONTINUED | OUTPATIENT
Start: 2018-05-24 | End: 2018-05-27

## 2018-05-24 RX ORDER — LORAZEPAM 2 MG/ML
3 INJECTION INTRAMUSCULAR
Status: DISCONTINUED | OUTPATIENT
Start: 2018-05-24 | End: 2018-05-27

## 2018-05-24 RX ORDER — DOXEPIN HYDROCHLORIDE 50 MG/1
1 CAPSULE ORAL
Status: DISCONTINUED | OUTPATIENT
Start: 2018-05-24 | End: 2018-05-29

## 2018-05-24 RX ORDER — LORAZEPAM 2 MG/ML
4 INJECTION INTRAMUSCULAR EVERY 10 MIN PRN
Status: DISCONTINUED | OUTPATIENT
Start: 2018-05-24 | End: 2018-05-27

## 2018-05-24 RX ORDER — HEPARIN SODIUM 5000 [USP'U]/ML
5000 INJECTION, SOLUTION INTRAVENOUS; SUBCUTANEOUS EVERY 8 HOURS SCHEDULED
Status: DISCONTINUED | OUTPATIENT
Start: 2018-05-24 | End: 2018-05-29

## 2018-05-24 RX ORDER — ENALAPRIL MALEATE 10 MG/1
20 TABLET ORAL DAILY
Status: DISCONTINUED | OUTPATIENT
Start: 2018-05-24 | End: 2018-05-26

## 2018-05-24 RX ORDER — ASPIRIN 81 MG/1
81 TABLET ORAL DAILY
Status: DISCONTINUED | OUTPATIENT
Start: 2018-05-24 | End: 2018-05-29

## 2018-05-24 RX ORDER — LORAZEPAM 2 MG/ML
2 INJECTION INTRAMUSCULAR
Status: DISCONTINUED | OUTPATIENT
Start: 2018-05-24 | End: 2018-05-27

## 2018-05-24 RX ORDER — DEXTROSE MONOHYDRATE 25 G/50ML
50 INJECTION, SOLUTION INTRAVENOUS
Status: DISCONTINUED | OUTPATIENT
Start: 2018-05-24 | End: 2018-05-29

## 2018-05-24 RX ORDER — HYDROMORPHONE HYDROCHLORIDE 1 MG/ML
1 INJECTION, SOLUTION INTRAMUSCULAR; INTRAVENOUS; SUBCUTANEOUS EVERY 2 HOUR PRN
Status: DISCONTINUED | OUTPATIENT
Start: 2018-05-24 | End: 2018-05-27

## 2018-05-24 RX ORDER — QUETIAPINE 100 MG/1
100 TABLET, FILM COATED ORAL NIGHTLY
Status: DISCONTINUED | OUTPATIENT
Start: 2018-05-24 | End: 2018-05-29

## 2018-05-24 RX ORDER — DIVALPROEX SODIUM 500 MG/1
500 TABLET, EXTENDED RELEASE ORAL 2 TIMES DAILY
Status: DISCONTINUED | OUTPATIENT
Start: 2018-05-24 | End: 2018-05-29

## 2018-05-24 RX ORDER — FOLIC ACID 1 MG/1
1 TABLET ORAL
Status: DISCONTINUED | OUTPATIENT
Start: 2018-05-24 | End: 2018-05-25

## 2018-05-24 RX ORDER — LABETALOL HYDROCHLORIDE 5 MG/ML
10 INJECTION, SOLUTION INTRAVENOUS EVERY 4 HOURS PRN
Status: DISCONTINUED | OUTPATIENT
Start: 2018-05-24 | End: 2018-05-29

## 2018-05-24 NOTE — ED PROVIDER NOTES
Patient Seen in: [facility] Emergency Department    History   Patient presents with:  Eval-P (psychiatric)    Stated Complaint: Vomiting, Last drink over 24 hours ago, Pale, Tremors    HPI    42-year-old male presents emergency department who states Ht 167.6 cm (5' 6\")   Wt 65 kg   SpO2 100%   BMI 23.13 kg/m²         Physical Exam    Vital signs reviewed  General appearance: Patient is alert, ill-appearing  HEENT: Pupils equal react to light extraocular muscles intact no scleral icterus, mucous membr were created for panel order CBC WITH DIFFERENTIAL WITH PLATELET.   Procedure                               Abnormality         Status                     ---------                               -----------         ------                     CBC W/ DIFFERDARLENE signs and monitor. Nursing notes were reviewed. Critical care time was[60 minutes], and exclusive of procedures.     ED Course as of May 24 1822  ------------------------------------------------------------      [facility]     Admission disposition: 5/24/2018

## 2018-05-25 ENCOUNTER — APPOINTMENT (OUTPATIENT)
Dept: ULTRASOUND IMAGING | Facility: HOSPITAL | Age: 48
DRG: 896 | End: 2018-05-25
Payer: MEDICAID

## 2018-05-25 PROCEDURE — 99232 SBSQ HOSP IP/OBS MODERATE 35: CPT | Performed by: HOSPITALIST

## 2018-05-25 PROCEDURE — 76700 US EXAM ABDOM COMPLETE: CPT | Performed by: NURSE PRACTITIONER

## 2018-05-25 RX ORDER — DIPHENHYDRAMINE HCL 25 MG
25 CAPSULE ORAL EVERY 6 HOURS PRN
Status: DISCONTINUED | OUTPATIENT
Start: 2018-05-25 | End: 2018-05-25

## 2018-05-25 RX ORDER — DEXMEDETOMIDINE HYDROCHLORIDE 4 UG/ML
INJECTION, SOLUTION INTRAVENOUS CONTINUOUS PRN
Status: DISCONTINUED | OUTPATIENT
Start: 2018-05-25 | End: 2018-05-27

## 2018-05-25 RX ORDER — ENALAPRIL MALEATE 20 MG/1
TABLET ORAL
Qty: 30 TABLET | Refills: 0 | OUTPATIENT
Start: 2018-05-25

## 2018-05-25 RX ORDER — DIPHENHYDRAMINE HYDROCHLORIDE 50 MG/ML
12.5 INJECTION INTRAMUSCULAR; INTRAVENOUS EVERY 6 HOURS PRN
Status: DISCONTINUED | OUTPATIENT
Start: 2018-05-25 | End: 2018-05-27

## 2018-05-25 RX ORDER — POTASSIUM CHLORIDE 14.9 MG/ML
20 INJECTION INTRAVENOUS ONCE
Status: COMPLETED | OUTPATIENT
Start: 2018-05-25 | End: 2018-05-25

## 2018-05-25 RX ORDER — DIPHENHYDRAMINE HCL 25 MG
25 CAPSULE ORAL EVERY 6 HOURS PRN
Status: DISCONTINUED | OUTPATIENT
Start: 2018-05-25 | End: 2018-05-27

## 2018-05-25 NOTE — PAYOR COMM NOTE
--------------  ADMISSION REVIEW     Payor: [employer] #:  [id number]  Authorization Number: N/A    Admit date: 5/24/18  Admit time: 1931       Admitting Physician: Geno Juan MD  Attending Physician:  Benitez Leon 167.6 cm (5' 6\")   Wt 65 kg   SpO2 100%   BMI 23.13 kg/m²      Physical Exam  Vital signs reviewed  General appearance: Patient is alert, ill-appearing  HEENT: Pupils equal react to light extraocular muscles intact no scleral icterus, mucous membranes are be given IV fluids along with pain medication. Patient's laboratory results show he is hyperglycemic dehydrated and lipase is elevated consistent with pancreatitis. Feel patient needs to go the ICU. Will be given Ativan along with fluids.   Patient was s HOSPITALIST  History and Physical     Joe Vencescathy Patient Status:  Inpatient    1970 MRN [id number]   [facility] 4SW-A Attending Kathie Puente MD   Hosp Day # 0 PCP China Mccalulm DO     Chief Complaint: Headache tremors abdominal Disp:  Rfl:    HydrOXYzine HCl 50 MG Oral Tab Take 1 tablet (50 mg total) by mouth 3 (three) times daily as needed for Anxiety. Disp: 60 tablet Rfl: 0   Metoprolol Tartrate 50 MG Oral Tab Take 1 tablet (50 mg total) by mouth 2 (two) times daily.  Disp: 60 t lesions. Psychiatric: Appropriate mood and affect.     Diagnostic Data:    Labs:  Recent Labs   Lab  05/24/18   1555   WBC  13.4*   HGB  11.4*   MCV  79.8*   PLT  142.0*     Lab  05/24/18   1555   GLU  442*   BUN  16   CREATSERUM  1.49*   GFRAA  64   GFRN mg Intravenous Kanika Mckeon RN      divalproex Sodium ER (DEPAKOTE) 24 hr tab 500 mg     Date Action Dose Route User    5/25/2018 0900 Given 500 mg Oral Kanika Mckeon RN    5/24/2018 2048 Given 500 mg Oral Keshia Torres RN      Enalapril Maleate (V Fiona Ludwig, RN      potassium chloride IVPB premix 20 mEq     Date Action Dose Route User    5/25/2018 0800 New Bag 20 mEq Intravenous Kenny Giovanna, RN      0.9%  NaCl infusion     Date Action Dose Route User    5/25/2018 3835 Rate/Dose Change (none) Melody Sherwood

## 2018-05-25 NOTE — PROGRESS NOTES
ICU  Critical Care APRN Progress Note    NAME: Ori Flanagan - ROOM: [address] [place] [address] - MRN: [id number] - Age: 57year old - :1970    History Of Present Illness:  Ori Flanagan is a 57year old male with PMHx significant for alcohol abuse, pancre of systems was completed. Pertinent positives and negatives noted in the HPI.     OBJECTIVE  Vitals:  BP (!) 156/101 (BP Location: Right arm)   Pulse 102   Temp 99.2 °F (37.3 °C) (Temporal)   Resp 20   Ht 167.6 cm (5' 6\")   Wt 143 lb 4.8 oz (65 kg)   SpO2 A1c--last was 6.7 in March  -IV hydration  -Follow electrolytes    3. TERRIE--2/2 dehydration  -IVF hydration  -Follow renal indices    4.   Pancreatitis--alcohol induced, with abdominal pain  -Follow lipase level  -Pain medications for abdominal pain  -Abdom

## 2018-05-25 NOTE — CM/SW NOTE
MSW attempted to meet with the patient due to [facility] order. The patient informed MSW he has a long hx of anxiety and depression and has seen multiple psychiatrists. He would prefer to see a Psychiatrist upon this admission rather than a SW.   The patient in

## 2018-05-25 NOTE — PROGRESS NOTES
[facility]  Progress Note     Levell Wethersfield Patient Status:  Inpatient    1970 MRN [id number]   [facility] 4SW-A Attending Sindi Abreu MD   Hosp Day # 1 PCP Rosario Rebolledo DO     Chief Complaint: abdominal pain    S: Pa multivitamin/thiamine/folic acid infusion   Intravenous Daily   • AmLODIPine Besylate  10 mg Oral Daily   • aspirin  81 mg Oral Daily   • divalproex Sodium ER  500 mg Oral BID   • Enalapril Maleate  20 mg Oral Daily   • Metoprolol Tartrate  50 mg Oral 2x D

## 2018-05-25 NOTE — TELEPHONE ENCOUNTER
Authorizing Provider Encounter Provider Ordering Provider   Lakhwinder Doherty MD (none) Lakhwinder Doherty MD   Medication Detail     Medication Quantity Refills Start End   Enalapril Maleate 20 MG Oral Tab 30 tablet 0 2/14/2018    Sig :  Take 1 tablet (20 mg total

## 2018-05-25 NOTE — PLAN OF CARE
NEUROLOGICAL - ADULT    • Absence of seizures Progressing    • Remains free of injury related to seizure activity Progressing          METABOLIC/FLUID AND ELECTROLYTES - ADULT    • Glucose maintained within prescribed range Progressing    • Electrolytes ma

## 2018-05-25 NOTE — H&P
[facility]  History and Physical     Coit Sealevel Patient Status:  Inpatient    1970 MRN [id number]   [facility] 4SW-A Attending Chandler Brannon MD   Hosp Day # 0 PCP Caroline Uribe DO     Chief Complaint: Anxiety headache Mother        Allergies:   Dilantin                ITCHING    Comment:irriatibility  Morphine                RASH  Norco [Hydrocodone-*    ITCHING    Comment:Itching, dizziness             Itching, dizziness  Phenytoin               ITCHING    Comment:irri daily. Disp: 30 tablet Rfl: 0       Review of Systems:   A comprehensive 14 point review of systems was completed. Pertinent positives and negatives noted in the HPI.     Physical Exam:    BP (!) 156/101 (BP Location: Right arm)   Pulse 102   Temp 99.2 ° in ICU under seizure precautions  5. Diabetes type 2 hyperglycemia protocol-uncontrolled hemoglobin A1c 8  6. Hyponatremia and dehydration continue IV fluids  7. Debulk acidosis related to dehydration  8.   AK I and prerenal azotemia continue IV fluids

## 2018-05-25 NOTE — BH PROGRESS NOTE
Went to see the pt. He stated he started drinking again this week. He admits to having a couple drinks before he went to his daughters graduation. The pt said, this turned in to many more. He doesn't want any etoh treatment.   He said, he will stop on h

## 2018-05-25 NOTE — TELEPHONE ENCOUNTER
See 4/18/18 telephone encounter, we no longer accept patient's insurance and patient was to find a new PCP to assume care. I am unable to refill his medications as he is no longer a patient in our practice.

## 2018-05-25 NOTE — PROGRESS NOTES
05/25/18 5587   Clinical Encounter Type   Visited With Health care provider  (Patient unavailable)   RN stated to stop back for a visit tomorrow if patient is available.

## 2018-05-25 NOTE — CONSULTS
Pulmonary / Critical Care H&P/Consult       NAME: Lisa Bruno - ROOM: [address] [place] [address] MRN: [id number] - Age: 57year old - :  1970    Date of Admission: 2018  3:40 PM  Admission Diagnosis: Alcoholic ketoacidosis [X85.7]  Hyperglycemia [R73.9 Caffeine Concern Yes    Comment: 1 coke qd    Exercise No     Social History Narrative   None on file      Denies tobacco, illicits  Drinks a bottle of hard alcohol daily    Family History:  Family History   Problem Relation Age of Onset   • Heart Attack F O2: ra              Wt Readings from Last 3 Encounters:  05/25/18 : 146 lb 6.2 oz (66.4 kg)  04/17/18 : 145 lb (65.8 kg)  04/08/18 : 144 lb 9.6 oz (65.6 kg)        Intake/Output Summary (Last 24 hours) at 05/25/18 0720  Last data filed at 05/25/18 0600 sensation and reflexes       throughout         Recent Labs   Lab  05/24/18   1555  05/25/18   0416   WBC  13.4*  14.3*   HGB  11.4*  9.2*   HCT  34.0*  28.0*   PLT  142.0*  124.0*     Recent Labs   Lab  05/24/18 2011   INR  1.21*         Recent Labs   L

## 2018-05-26 PROCEDURE — 99233 SBSQ HOSP IP/OBS HIGH 50: CPT | Performed by: HOSPITALIST

## 2018-05-26 RX ORDER — ACETAMINOPHEN 325 MG/1
650 TABLET ORAL EVERY 6 HOURS PRN
Status: DISCONTINUED | OUTPATIENT
Start: 2018-05-26 | End: 2018-05-29

## 2018-05-26 RX ORDER — POTASSIUM CHLORIDE 20 MEQ/1
40 TABLET, EXTENDED RELEASE ORAL ONCE
Status: COMPLETED | OUTPATIENT
Start: 2018-05-26 | End: 2018-05-26

## 2018-05-26 NOTE — PROGRESS NOTES
[facility] PULMONARY/CRITICAL CARE    S: No new events overnight. Slept most of the night.     Meds:  • multivitamin/thiamine/folic acid infusion   Intravenous Daily   • AmLODIPine Besylate  10 mg Oral Daily   • aspirin  81 mg Oral Daily   • divalproex Sodium ER 34.0*  28.0*  28.8*   MCV  79.8*  82.4  84.7   MCH  26.8*  27.1  26.8*   MCHC  33.5  32.9  31.6   RDW  15.2  15.9  15.9   NEPRELIM  11.92*   --   6.91*   WBC  13.4*  14.3*  11.5   PLT  142.0*  124.0*  132.0*     Recent Labs   Lab  05/24/18   1555  05/25/18

## 2018-05-26 NOTE — PROGRESS NOTES
Assumed care of patient from ICU at 1400. Patient is A&Ox3, withdrawn. Responds to commands. NSR on telemetry , VSS, afebrile. CIWA=3-5 for HA, anxiety, mild tremor. One dose prn atarax given for anxiety, patient sleeping when reassessed.   Tolerated

## 2018-05-26 NOTE — PLAN OF CARE
Minimal or absence of nausea and vomiting Progressing      Electrolytes maintained within normal limits Progressing      Absence of seizures Progressing      Pt A0x3 (disoriented to time) this AM. Drowsy and sleeping most of morning.  Pt more confused michelle

## 2018-05-26 NOTE — PLAN OF CARE
5/25 2000: Pt received asleep, arousable, follow commands. Room air. SR. IVF NS at 150 cc/hr. CIWA-0. Pt's wife at bedside. 5/26 0000: Pt drowsy. CIWA 2. Denies pain. 0500: Pt more awake. Tylenol given for mild headache. Voiding.     METABOLIC/FLUID AND E

## 2018-05-26 NOTE — PROGRESS NOTES
05/26/18 1716   Clinical Encounter Type   Visited With Patient and family together  (Wife at bedside)   Routine Visit Introduction   Patient's Supportive Strategies/Resources  provided emotional support for the pt and his wife.    Referral From VAISHNAVI

## 2018-05-26 NOTE — PROGRESS NOTES
[facility]  Progress Note     Marilin Arlyn Patient Status:  Inpatient    1970 MRN [id number]   [facility] 4SW-A Attending Anna Hernandez MD   Hosp Day # 2 PCP Kendy Carney DO     Chief Complaint: pancreatitis    S: Diamond Epic.    Medications:   • multivitamin/thiamine/folic acid infusion   Intravenous Daily   • AmLODIPine Besylate  10 mg Oral Daily   • aspirin  81 mg Oral Daily   • divalproex Sodium ER  500 mg Oral BID   • Enalapril Maleate  20 mg Oral Daily   • Metoprolol

## 2018-05-27 DIAGNOSIS — Z00.00 LABORATORY EXAM ORDERED AS PART OF ROUTINE GENERAL MEDICAL EXAMINATION: ICD-10-CM

## 2018-05-27 PROCEDURE — 99232 SBSQ HOSP IP/OBS MODERATE 35: CPT | Performed by: HOSPITALIST

## 2018-05-27 RX ORDER — POTASSIUM CHLORIDE 20 MEQ/1
40 TABLET, EXTENDED RELEASE ORAL EVERY 4 HOURS
Status: COMPLETED | OUTPATIENT
Start: 2018-05-27 | End: 2018-05-27

## 2018-05-27 RX ORDER — LORAZEPAM 2 MG/ML
1 INJECTION INTRAMUSCULAR EVERY 4 HOURS PRN
Status: DISCONTINUED | OUTPATIENT
Start: 2018-05-27 | End: 2018-05-29

## 2018-05-27 RX ORDER — SODIUM CHLORIDE, SODIUM LACTATE, POTASSIUM CHLORIDE, CALCIUM CHLORIDE 600; 310; 30; 20 MG/100ML; MG/100ML; MG/100ML; MG/100ML
INJECTION, SOLUTION INTRAVENOUS CONTINUOUS
Status: DISCONTINUED | OUTPATIENT
Start: 2018-05-27 | End: 2018-05-29

## 2018-05-27 RX ORDER — FOLIC ACID 1 MG/1
1 TABLET ORAL DAILY
Status: DISCONTINUED | OUTPATIENT
Start: 2018-05-27 | End: 2018-05-29

## 2018-05-27 RX ORDER — MELATONIN
100 DAILY
Status: DISCONTINUED | OUTPATIENT
Start: 2018-05-27 | End: 2018-05-29

## 2018-05-27 NOTE — PROGRESS NOTES
[facility]  Progress Note     Deysi Sandra Patient Status:  Inpatient    1970 MRN [id number]   [facility] 4SW-A Attending Pauly Figueroa MD   Hosp Day # 3 PCP Renée Grayson DO     Chief Complaint: pancreatitis    S: Diamond Recent Labs   Lab  05/24/18 2011   PTP  15.8*   INR  1.21*       No results for input(s): TROP, CK in the last 168 hours. Imaging: Imaging data reviewed in Epic.     Medications:   • Potassium Chloride ER  40 mEq Oral Q4H   • multivitamin/th

## 2018-05-27 NOTE — PLAN OF CARE
GASTROINTESTINAL - ADULT    • Minimal or absence of nausea and vomiting Progressing    • Maintains or returns to baseline bowel function Progressing          Patient alert and oriented x4, VS stable, RA, , NSR/ST on tele  No complaint of pain or discomf

## 2018-05-28 ENCOUNTER — APPOINTMENT (OUTPATIENT)
Dept: GENERAL RADIOLOGY | Facility: HOSPITAL | Age: 48
DRG: 896 | End: 2018-05-28
Attending: HOSPITALIST
Payer: MEDICAID

## 2018-05-28 PROCEDURE — 99232 SBSQ HOSP IP/OBS MODERATE 35: CPT | Performed by: HOSPITALIST

## 2018-05-28 PROCEDURE — 71101 X-RAY EXAM UNILAT RIBS/CHEST: CPT | Performed by: HOSPITALIST

## 2018-05-28 RX ORDER — TRAMADOL HYDROCHLORIDE 50 MG/1
50 TABLET ORAL EVERY 6 HOURS PRN
Status: DISCONTINUED | OUTPATIENT
Start: 2018-05-28 | End: 2018-05-29

## 2018-05-28 NOTE — PLAN OF CARE
GASTROINTESTINAL - ADULT    • Minimal or absence of nausea and vomiting Progressing    • Maintains or returns to baseline bowel function Progressing        Impaired Functional Mobility    • Achieve highest/safest level of mobility/gait Progressing        N

## 2018-05-28 NOTE — PHYSICAL THERAPY NOTE
PHYSICAL THERAPY EVALUATION - INPATIENT     Room Number: 7433/6405-Q  Evaluation Date: 5/28/2018  Type of Evaluation: Initial  Physician Order: PT Eval and Treat    Presenting Problem: pancreatitis, ETOH withdrawal  Reason for Therapy: Mobility Dysfu • High blood pressure    • Pancreatitis, alcoholic, acute    • Scoliosis    • Seizure disorder [facility])    • Unspecified essential hypertension        Past Surgical History  Past Surgical History:  1978: OTHER SURGICAL HISTORY      Comment: lengthened right wheelchair, bedside commode, etc.): None   -   Moving from lying on back to sitting on the side of the bed?: None   How much help from another person does the patient currently need. ..   -   Moving to and from a bed to a chair (including a wheelchair)? : A therapy include Depression, alcohol abuse (long term), splenectomy, chronic hepatitis and pancreatitis.   In this PT evaluation, the patient presents with the following impairments: decreased safety awareness/insight into functional limitations, lethargy, d

## 2018-05-29 ENCOUNTER — TELEPHONE (OUTPATIENT)
Dept: FAMILY MEDICINE CLINIC | Facility: CLINIC | Age: 48
End: 2018-05-29

## 2018-05-29 VITALS
RESPIRATION RATE: 18 BRPM | TEMPERATURE: 98 F | DIASTOLIC BLOOD PRESSURE: 97 MMHG | HEIGHT: 66 IN | HEART RATE: 80 BPM | OXYGEN SATURATION: 95 % | SYSTOLIC BLOOD PRESSURE: 154 MMHG | WEIGHT: 161.19 LBS | BODY MASS INDEX: 25.9 KG/M2

## 2018-05-29 PROCEDURE — 99239 HOSP IP/OBS DSCHRG MGMT >30: CPT | Performed by: HOSPITALIST

## 2018-05-29 RX ORDER — FOLIC ACID 1 MG/1
TABLET ORAL
Qty: 30 TABLET | Refills: 0 | OUTPATIENT
Start: 2018-05-29

## 2018-05-29 RX ORDER — HYDROCODONE BITARTRATE AND IBUPROFEN 5; 200 MG/1; MG/1
1 TABLET ORAL EVERY 8 HOURS PRN
Qty: 15 TABLET | Refills: 0 | Status: SHIPPED | OUTPATIENT
Start: 2018-05-29 | End: 2018-06-04

## 2018-05-29 NOTE — PROGRESS NOTES
Patient seen and examined. Medically clear to discharge today. Doing well. Has rib fractures. No good answer to pain control. Rib fractures appear acute and due cause narcotic level of pain. Hx of uncontrolled seizures so will stop tramadol.   Jacinda-sandra

## 2018-05-29 NOTE — PHYSICAL THERAPY NOTE
PHYSICAL THERAPY TREATMENT NOTE - INPATIENT    Room Number: 3821/7797-M     Session: 1   Number of Visits to Meet Established Goals: 5    Presenting Problem: pancreatitis, ETOH withdrawal  Pt is 57year old male admitted on 5/24/2018 from home with c/o he now I found out I have fractured ribs. \"    Patient’s self-stated goal is to go home.     OBJECTIVE  Precautions: Seizure    WEIGHT BEARING RESTRICTION                   PAIN ASSESSMENT   Ratin  Location: L lower abdomen  Management Techniques: Repositi pattern. Patient End of Session: In bed;Needs met;Call light within reach;RN aware of session/findings; All patient questions and concerns addressed; Alarm set    ASSESSMENT     Patient is a 57year old male admitted on 5/24/2018 for headache, tremor

## 2018-05-29 NOTE — PROGRESS NOTES
DEVENDRA HOSPITALIST  Progress Note     [facility] Patient Status:  Inpatient    1970 MRN [id number]   [facility] 4SW-A Attending Parth Pierre MD   Hosp Day # 4 PCP Quang Cannon DO     Chief Complaint: pancreatitis    S: Diamond Recent Labs   Lab  05/24/18 2011   PTP  15.8*   INR  1.21*       No results for input(s): TROP, CK in the last 168 hours. Imaging: Imaging data reviewed in Epic.     Medications:   • Thiamine HCl  100 mg Oral Daily   • folic acid  1 mg Oral

## 2018-05-29 NOTE — PROGRESS NOTES
Pt is A/O x 4. Tramadol and tylenol for broken ribs with some relief. Bentyl for bloating/gas pain. Seizure precautions no seizure activity. LR infusing. Tele NSR- elevated BP at times. Labetalol PRN for HTN. Voiding in urinal.  Up to chair.   Slightl

## 2018-05-29 NOTE — PROGRESS NOTES
Discharge info given and script, pt stated understand. Ride called to pick him. Transport called to wheel him down to the [address].

## 2018-05-29 NOTE — PAYOR COMM NOTE
--------------  CONTINUED STAY REVIEW    Payor: Obie Tinsley #:  [id number]  Authorization Number: N/A    Admit date: 5/24/18  Admit time: 1931    Admitting Physician: June Gauthier MD  Attending Physician:  Radha Hyde CMP and lipase. Check Rib XR. Likely DC jose.   Quality:  · DVT Prophylaxis: heparin  · CODE status: full  · Kilpatrick: no  · Central line: no   Estimated date of discharge: TBD  Discharge is dependent on: clinical stability  At this point Mr. Diamond Jean is expec Bag (none) Intravenous Melida Donis RN    5/28/2018 1739 New Bag (none) Intravenous Kam Suárez RN      LORazepam (ATIVAN) injection 1 mg     Date Action Dose Route User    5/29/2018 6021 Given 1 mg Intravenous Melida Donis RN      metoprolol

## 2018-05-29 NOTE — TELEPHONE ENCOUNTER
Ms Chavez is a 57 yo F with PMH of an Open hysterectomy for Endometriosis (and per patient possible cancer?) who presented two weeks ago with a SBO requiring Ex lap and LUCILLE by Dr. Goldberg on 7/10/2017 who presents with intraabdominal collections seen in CT scan.     - Patient not septic, with no leukocytosis and benign abdominal exam, after discussion with her and IR she wanted to go home and proceed with IR drainage of fluid collections as an outpatient, likely this Tuesday. She will take Ciprofloxacin and Flagyl PO until then and will follow up with Dr. Goldberg this week.   - She was given a PO challenge in the ED and was able to tolerate a sandwich.   - Sister lost Oxycodone that was prescribed to her for discharge, ED can prescribe some PO medication for next week.   - Advised to come back to ED or call surgery if any worsening abdominal pain, fevers, chills or nausea and emesis.   - Discussed with Dr. Arellano, will notify Dr. Goldberg tomorrow.     Perla Farah MD  General Surgery PGY IV  Beeper: [phone]       We longer take insurance. PCP removal sent.

## 2018-05-30 NOTE — DISCHARGE SUMMARY
DEVENDRA HOSPITALIST  DISCHARGE SUMMARY     Negin Jones Patient Status:  Inpatient    1970 MRN [id number]   [facility] 3NE-A Attending No att. providers found   Hosp Day # 5 PCP [place], DO     Date of Admission: 2018  D evaluated and treated. Patient underwent exploratory lap with evacuation of intra-abdominal hematoma and splenectomy for ruptured spleen by Dr. Janice Rdz on March 25. He has ruptured spleen appeared to have been related to alcohol abuse.   Patient already has mouth 2 (two) times daily. Quantity:  60 tablet  Refills:  0     Enalapril Maleate 20 MG Tabs  Commonly known as:  VASOTEC      Take 1 tablet (20 mg total) by mouth once daily.    Quantity:  30 tablet  Refills:  0     folic acid 1 MG Tabs  Commonly known DO Kleber Anders Amy 1499  [phone]    Schedule an appointment as soon as possible for a visit        Vital signs:  Temp:  [98.3 °F (36.8 °C)] 98.3 °F (36.8 °C)  Resp:  [18] 18    Physical Exam:    General: No acu

## 2018-05-31 NOTE — TELEPHONE ENCOUNTER
Looks like pt may have [facility]. LVM asking pt to call asap with insurance information otherwise we will have to cancel his appt with Dr. Saen Garcia on June 4 and will not be able to refill his med.   PLS VERIFY THAT DR. Dana Patricio IS ON HIS INSUR

## 2018-06-01 NOTE — TELEPHONE ENCOUNTER
Pt confirmed insurance:  [address] [facility] (Medicaid); Dr. Tita Garza is assigned to pt. Insurance verified.

## 2018-06-04 ENCOUNTER — LAB ENCOUNTER (OUTPATIENT)
Dept: LAB | Age: 48
End: 2018-06-04
Attending: FAMILY MEDICINE
Payer: MEDICAID

## 2018-06-04 ENCOUNTER — OFFICE VISIT (OUTPATIENT)
Dept: FAMILY MEDICINE CLINIC | Facility: CLINIC | Age: 48
End: 2018-06-04

## 2018-06-04 VITALS
WEIGHT: 156 LBS | DIASTOLIC BLOOD PRESSURE: 78 MMHG | TEMPERATURE: 99 F | RESPIRATION RATE: 16 BRPM | SYSTOLIC BLOOD PRESSURE: 110 MMHG | HEIGHT: 66.5 IN | BODY MASS INDEX: 24.78 KG/M2 | HEART RATE: 82 BPM

## 2018-06-04 DIAGNOSIS — K85.20 ALCOHOL-INDUCED ACUTE PANCREATITIS WITHOUT INFECTION OR NECROSIS: ICD-10-CM

## 2018-06-04 DIAGNOSIS — D64.9 ANEMIA, UNSPECIFIED TYPE: ICD-10-CM

## 2018-06-04 DIAGNOSIS — Z09 HOSPITAL DISCHARGE FOLLOW-UP: Primary | ICD-10-CM

## 2018-06-04 DIAGNOSIS — R11.0 NAUSEA: ICD-10-CM

## 2018-06-04 DIAGNOSIS — K70.30 ALCOHOLIC CIRRHOSIS OF LIVER WITHOUT ASCITES (HCC): ICD-10-CM

## 2018-06-04 DIAGNOSIS — R79.9 ABNORMAL BLOOD CHEMISTRY: ICD-10-CM

## 2018-06-04 DIAGNOSIS — Z90.81 H/O SPLENECTOMY: ICD-10-CM

## 2018-06-04 DIAGNOSIS — S22.42XA CLOSED FRACTURE OF MULTIPLE RIBS OF LEFT SIDE, INITIAL ENCOUNTER: ICD-10-CM

## 2018-06-04 DIAGNOSIS — I10 ESSENTIAL HYPERTENSION: ICD-10-CM

## 2018-06-04 DIAGNOSIS — Z09 HOSPITAL DISCHARGE FOLLOW-UP: ICD-10-CM

## 2018-06-04 DIAGNOSIS — R56.9 SEIZURE (HCC): ICD-10-CM

## 2018-06-04 PROCEDURE — 36415 COLL VENOUS BLD VENIPUNCTURE: CPT

## 2018-06-04 PROCEDURE — 85025 COMPLETE CBC W/AUTO DIFF WBC: CPT

## 2018-06-04 PROCEDURE — 99214 OFFICE O/P EST MOD 30 MIN: CPT | Performed by: FAMILY MEDICINE

## 2018-06-04 PROCEDURE — 80053 COMPREHEN METABOLIC PANEL: CPT

## 2018-06-04 PROCEDURE — 1111F DSCHRG MED/CURRENT MED MERGE: CPT | Performed by: FAMILY MEDICINE

## 2018-06-04 PROCEDURE — 83690 ASSAY OF LIPASE: CPT

## 2018-06-04 RX ORDER — HYDROCODONE BITARTRATE AND IBUPROFEN 5; 200 MG/1; MG/1
1 TABLET ORAL 2 TIMES DAILY PRN
Qty: 30 TABLET | Refills: 0 | Status: SHIPPED | OUTPATIENT
Start: 2018-06-04 | End: 2018-07-04

## 2018-06-04 RX ORDER — ONDANSETRON 4 MG/1
4 TABLET, FILM COATED ORAL EVERY 8 HOURS PRN
Qty: 30 TABLET | Refills: 0 | Status: SHIPPED | OUTPATIENT
Start: 2018-06-04 | End: 2018-06-18

## 2018-06-04 NOTE — PROGRESS NOTES
Jake Ford is a 57year old male. HPI:   Pt. States he is here for follow up since he left the hospital on 5/29/18. Has not had a drink since he left the hospital.  He is having urges. He has thinking about beer which he normally does not drink. He is eating well. Current Outpatient Prescriptions:  Hydrocodone-Ibuprofen 5-200 MG Oral Tab Take 1 tablet by mouth every 8 (eight) hours as needed for Pain. Disp: 15 tablet Rfl: 0   Sertraline HCl 50 MG Oral Tab Take 75 mg by mouth daily.  Disp:  Rfl pressure    • Pancreatitis, alcoholic, acute    • Scoliosis    • Seizure disorder [facility])    • Unspecified essential hypertension       Social History:  Smoking status: Never Smoker                                                              Smokeless tobac Range   Ethyl Alcohol <3 <3 mg/dL   -LIPASE   Result Value Ref Range   Lipase 673 (H) 73 - 393 U/L   -MAGNESIUM   Result Value Ref Range   Magnesium 1.2 (L) 1.8 - 2.5 mg/dL   -DRUG SCREEN 7 W/CONFIRMATION, URINE   Result Value Ref Range   Amphetamine Urine METABOLIC PANEL (8)   Result Value Ref Range   Glucose 147 (H) 70 - 99 mg/dL   BUN 13 8 - 20 mg/dL   Creatinine 0.59 (L) 0.70 - 1.30 mg/dL   GFR, Non-African American 121 >=60   GFR, -American 140 >=60   Calcium, Total 8.7 8.3 - 10.3 mg/dL   Sodium 4.0 3.6 - 5.1 mmol/L   Chloride 96 (L) 101 - 111 mmol/L   CO2 30.0 22.0 - 32.0 mmol/L   -LIPASE   Result Value Ref Range   Lipase 631 (H) 73 - 393 U/L   -ABORH (BLOOD TYPE)   Result Value Ref Range   ABO BLOOD TYPE B    RH BLOOD TYPE Positive    -ANTIBODY DIFFERENTIAL   Result Value Ref Range   WBC 13.4 (H) 4.0 - 13.0 x10(3) uL   RBC 4.26 (L) 4.30 - 5.70 x10(6)uL   HGB 11.4 (L) 13.0 - 17.0 g/dL   HCT 34.0 (L) 37.0 - 53.0 %   .0 (L) 150.0 - 450.0 10(3)uL   MCV 79.8 (L) 80.0 - 99.0 fL   MCH 26.8 (L) 27 heartburn  MUSCULOSKELETAL: denies back pain  EXTREMITIES:  No pain or numbness    EXAM:   /78   Pulse 82   Temp 99 °F (37.2 °C) (Oral)   Resp 16   Ht 66.5\"   Wt 156 lb   BMI 24.80 kg/m²   GENERAL: well developed, well nourished,in no apparent distr -pain control reviewed; using ibuprofen during the day and hydrocodone BID prn  Nausea  -- zofran prn  The patient indicates understanding of these issues and agrees to the plan.   Return in about 1 month (around 7/4/2018) for hospital follow up -- rib frac

## 2018-06-05 ENCOUNTER — TELEPHONE (OUTPATIENT)
Dept: FAMILY MEDICINE CLINIC | Facility: CLINIC | Age: 48
End: 2018-06-05

## 2018-06-05 DIAGNOSIS — R79.9 ABNORMAL BLOOD CHEMISTRY: Primary | ICD-10-CM

## 2018-06-05 NOTE — TELEPHONE ENCOUNTER
Medical Record Request Received    Date received in office:  5/31/2018    Requested from:   [place] [employer] - DIV OF REHAB, DISABILITY DETERMINATION    Records to be sent to:   [address], [address] [place] [address], [address] [place] [address]; ph @ [phone]

## 2018-06-18 DIAGNOSIS — R11.0 NAUSEA: ICD-10-CM

## 2018-06-19 RX ORDER — ONDANSETRON 4 MG/1
TABLET, FILM COATED ORAL
Qty: 30 TABLET | Refills: 0 | Status: SHIPPED | OUTPATIENT
Start: 2018-06-19 | End: 2018-01-01

## 2018-06-19 RX ORDER — TRAMADOL HYDROCHLORIDE 50 MG/1
TABLET ORAL
Qty: 60 TABLET | Refills: 0 | Status: SHIPPED | OUTPATIENT
Start: 2018-06-19 | End: 2018-07-19

## 2018-06-27 ENCOUNTER — HOSPITAL ENCOUNTER (EMERGENCY)
Facility: HOSPITAL | Age: 48
Discharge: HOME | End: 2018-06-27
Attending: EMERGENCY MEDICINE
Payer: MEDICAID

## 2018-06-27 ENCOUNTER — APPOINTMENT (OUTPATIENT)
Dept: GENERAL RADIOLOGY | Facility: HOSPITAL | Age: 48
End: 2018-06-27
Attending: EMERGENCY MEDICINE
Payer: MEDICAID

## 2018-06-27 ENCOUNTER — APPOINTMENT (OUTPATIENT)
Dept: CT IMAGING | Facility: HOSPITAL | Age: 48
End: 2018-06-27
Attending: EMERGENCY MEDICINE
Payer: MEDICAID

## 2018-06-27 VITALS
HEIGHT: 66 IN | TEMPERATURE: 98 F | BODY MASS INDEX: 24.91 KG/M2 | HEART RATE: 82 BPM | OXYGEN SATURATION: 99 % | DIASTOLIC BLOOD PRESSURE: 110 MMHG | RESPIRATION RATE: 18 BRPM | SYSTOLIC BLOOD PRESSURE: 161 MMHG | WEIGHT: 155 LBS

## 2018-06-27 DIAGNOSIS — S29.019A STRAIN OF THORACIC REGION, INITIAL ENCOUNTER: Primary | ICD-10-CM

## 2018-06-27 PROCEDURE — 93005 ELECTROCARDIOGRAM TRACING: CPT

## 2018-06-27 PROCEDURE — 71275 CT ANGIOGRAPHY CHEST: CPT | Performed by: EMERGENCY MEDICINE

## 2018-06-27 PROCEDURE — 96375 TX/PRO/DX INJ NEW DRUG ADDON: CPT

## 2018-06-27 PROCEDURE — 80053 COMPREHEN METABOLIC PANEL: CPT | Performed by: EMERGENCY MEDICINE

## 2018-06-27 PROCEDURE — 83690 ASSAY OF LIPASE: CPT | Performed by: EMERGENCY MEDICINE

## 2018-06-27 PROCEDURE — 93010 ELECTROCARDIOGRAM REPORT: CPT

## 2018-06-27 PROCEDURE — 84484 ASSAY OF TROPONIN QUANT: CPT | Performed by: EMERGENCY MEDICINE

## 2018-06-27 PROCEDURE — 85025 COMPLETE CBC W/AUTO DIFF WBC: CPT | Performed by: EMERGENCY MEDICINE

## 2018-06-27 PROCEDURE — 74177 CT ABD & PELVIS W/CONTRAST: CPT | Performed by: EMERGENCY MEDICINE

## 2018-06-27 PROCEDURE — 99285 EMERGENCY DEPT VISIT HI MDM: CPT

## 2018-06-27 PROCEDURE — 96374 THER/PROPH/DIAG INJ IV PUSH: CPT

## 2018-06-27 PROCEDURE — 71045 X-RAY EXAM CHEST 1 VIEW: CPT | Performed by: EMERGENCY MEDICINE

## 2018-06-27 RX ORDER — CYCLOBENZAPRINE HCL 10 MG
10 TABLET ORAL 3 TIMES DAILY PRN
Qty: 20 TABLET | Refills: 0 | Status: SHIPPED | OUTPATIENT
Start: 2018-06-27 | End: 2018-07-07

## 2018-06-27 RX ORDER — HYDROMORPHONE HYDROCHLORIDE 1 MG/ML
1 INJECTION, SOLUTION INTRAMUSCULAR; INTRAVENOUS; SUBCUTANEOUS EVERY 30 MIN PRN
Status: DISCONTINUED | OUTPATIENT
Start: 2018-06-27 | End: 2018-06-27

## 2018-06-27 RX ORDER — ONDANSETRON 2 MG/ML
4 INJECTION INTRAMUSCULAR; INTRAVENOUS ONCE
Status: COMPLETED | OUTPATIENT
Start: 2018-06-27 | End: 2018-06-27

## 2018-06-27 NOTE — ED INITIAL ASSESSMENT (HPI)
Pt states back pain for 2 weeks but recently pain has increased. Pt taking tramadol for pain and aleve. Pt states pain from left upper back radiating to lower back. Pt states no changes in urinary patterns.

## 2018-06-27 NOTE — ED PROVIDER NOTES
Patient Seen in: [facility] Emergency Department    History   Patient presents with:  Back Pain (musculoskeletal)    Stated Complaint: back pain/flank pain    HPI    Patient is a 57-year-old male comes emergency room for evaluation of left-sided tho 16  Temp: 97.9 °F (36.6 °C)  Temp src: Temporal  SpO2: 100 %  O2 Device: None (Room air)    Current:BP (!) 161/110   Pulse 82   Temp 97.9 °F (36.6 °C) (Temporal)   Resp 18   Ht 167.6 cm (5' 6\")   Wt 70.3 kg   SpO2 99%   BMI 25.02 kg/m²         Physical Ex limits   TROPONIN I - Normal   CBC WITH DIFFERENTIAL WITH PLATELET    Narrative: The following orders were created for panel order CBC WITH DIFFERENTIAL WITH PLATELET.   Procedure                               Abnormality         Status region of prior laceration. Involuting soft tissue density is noted in the left upper quadrant adjacent to the left hemidiaphragm, decreasing in size since immediate prior study. Associated postsurgical changes are seen.  May represent evolving chronic h her primary care provider for further evaluation and treatment, but to return immediately to the ER for worsening, concerning, new, changing or persisting symptoms. I discussed the case with the patient and they had no questions, complaints, or concerns.

## 2018-07-14 ENCOUNTER — APPOINTMENT (OUTPATIENT)
Dept: CT IMAGING | Facility: HOSPITAL | Age: 48
DRG: 439 | End: 2018-07-14
Attending: EMERGENCY MEDICINE
Payer: MEDICAID

## 2018-07-14 ENCOUNTER — APPOINTMENT (OUTPATIENT)
Dept: CV DIAGNOSTICS | Facility: HOSPITAL | Age: 48
DRG: 439 | End: 2018-07-14
Attending: HOSPITALIST
Payer: MEDICAID

## 2018-07-14 PROBLEM — R11.2 NON-INTRACTABLE VOMITING WITH NAUSEA, UNSPECIFIED VOMITING TYPE: Status: ACTIVE | Noted: 2018-07-14

## 2018-07-14 PROCEDURE — 93306 TTE W/DOPPLER COMPLETE: CPT | Performed by: HOSPITALIST

## 2018-07-14 PROCEDURE — 74177 CT ABD & PELVIS W/CONTRAST: CPT | Performed by: EMERGENCY MEDICINE

## 2018-07-14 NOTE — H&P
DEVENDRA HOSPITALIST  History and Physical     Ori Panchito Patient Status:  Inpatient    1970 MRN [id number]   [facility] 3NE-A Attending Nisreen Kathleen MD   Hosp Day # 0 PCP Dinorah Nickerson DO     Chief Complaint: abd pain    Hi Prior to Encounter:  TRAMADOL HCL 50 MG Oral Tab TAKE 1 TABLET BY MOUTH EVERY 8 HOURS AS NEEDED FOR PAIN Disp: 60 tablet Rfl: 0   Ondansetron HCl (ZOFRAN) 4 mg tablet TAKE 1 TABLET(4 MG) BY MOUTH EVERY 8 HOURS AS NEEDED FOR NAUSEA Disp: 30 tablet Rfl: 0 inspiratory effort. Clear to auscultation bilaterally. No rhonchi. Cardiovascular: S1, S2. Reg rate, tachycardic. No murmurs, rubs or gallops. Equal pulses. Chest and Back: No tenderness or deformity. Abdomen: Soft, diffusely tender, nondistended.   Po

## 2018-07-14 NOTE — ED INITIAL ASSESSMENT (HPI)
Pt to ED brought by wife for nausea and vomiting all day. Pt has been drinking alcohol for the last 2 days because of feeling depressed due to his cousin's recent death. Pt's wife states he has been sober for 2 months.  Pt has hx of DM and hasn't been check

## 2018-07-14 NOTE — CONSULTS
Pulmonary Consult     Assessment / Plan:  1. Etoh withdrawal  - CIWA  - benzos prn  - MVI, thiamine, folic acid  2. Pancreatitis  - IVFs  - pain control  - adat  3. Afib - now in NSR  - wean off dilt gtt  - cards to see  4. Depression  - psych to see  5.  D 30 tablet Rfl: 2 Not Taking   insulin glargine 100 UNIT/ML Subcutaneous Solution Inject 6 Units into the skin 2 (two) times daily.  Disp:  Rfl:  Taking   HydrOXYzine HCl 50 MG Oral Tab Take 1 tablet (50 mg total) by mouth 3 (three) times daily as needed for Topics Concern    Caffeine Concern Yes    Comment: 1 coke qd    Exercise No     Social History Narrative   None on file       Family History   Problem Relation Age of Onset   • Heart Attack Father 50   • Thyroid Disorder Mother          Exam:   07/14/18  0

## 2018-07-14 NOTE — PLAN OF CARE
Received patient from Floor on cardizem at 15mg/hr. Monitor shows S.tach 118/mt, Dr Isha Gallego updated , EKG done. CIWA 8, ativan given per protocol. Other vital signs are stable at this time. Report given to day RN.

## 2018-07-14 NOTE — CONSULTS
[facility]  Report of Consultation    Renita Jordan Patient Status:  Inpatient    1970 MRN [id number]   [facility] 4SW-A Attending Leeroy Taylor MD   Hosp Day # 0 PCP DO John Rose respiratory failure with hypoxia (HCC)     Shock ([address]     Acute abdomen     Fever     Acute delirium     Psychosis ([address]     Splenic rupture     S/P splenectomy     Leukemoid reaction     Secondary thrombocytosis     Hyperglycemia     Alcohol withdrawal s (BENTYL) cap 10 mg, 10 mg, Oral, TID  •  HydrOXYzine HCl (ATARAX) tab 50 mg, 50 mg, Oral, TID PRN  •  metoprolol Tartrate (LOPRESSOR) tab 50 mg, 50 mg, Oral, 2x Daily(Beta Blocker)  •  Enalapril Maleate (VASOTEC) tab 20 mg, 20 mg, Oral, Daily  •  divalproe 2 mg, Intravenous, Q30 Min PRN  •  LORazepam (ATIVAN) injection 3 mg, 3 mg, Intravenous, Q30 Min PRN  •  LORazepam (ATIVAN) injection 4 mg, 4 mg, Intravenous, Q15 Min PRN  •  metoprolol Tartrate (LOPRESSOR) tab 25 mg, 25 mg, Oral, BID PRN  •  INFUVITE ADUL (mg/dL)   Date Value   11/24/2013 0.87   03/30/2011 1.1   ----------  Creatinine (mg/dL)   Date Value   07/14/2018 0.77   07/14/2018 0.69 (L)   07/13/2018 0.94   ----------    Lab Results  Component Value Date   INR 1.15 (H) 07/14/2018   INR 1.21 (H) 05/24

## 2018-07-14 NOTE — BH PROGRESS NOTE
Went to see pt on medical floor. Explained that [facility] is following up with pt at this time for mental health and chemical dependency services. Pt stated that he wants to discuss ongoing treatment options with his wife and declined level of care assessment.

## 2018-07-14 NOTE — PLAN OF CARE
During admission navigator heart rate in the 210s. CIWA score of 24, patient reported an aura and the feeling that he was about to have a seizure. Rapid response called. Ativan given. Cardizem bolus given, drip started. Patient transferred to  - A.

## 2018-07-14 NOTE — ED PROVIDER NOTES
Patient Seen in: [facility] Emergency Department    History   Patient presents with:  Nausea/Vomiting/Diarrhea (gastrointestinal)  Alcohol Intoxication (neurologic)    Stated Complaint: vomiting all day. Type 1 DM.  Did not check his blood sugar toda air)    Current:BP (!) 161/108   Pulse 101   Temp 98 °F (36.7 °C) (Temporal)   Resp 15   Ht 168.9 cm (5' 6.5\")   Wt 74.8 kg   SpO2 99%   BMI 26.23 kg/m²         Physical Exam    HEENT : NCAT, EOMI, PEERL, throat clear, neck supple, no JVD, trachea midline RDW-SD 47.8 (*)     Lymphocyte Absolute 4.43 (*)     All other components within normal limits   CBC WITH DIFFERENTIAL WITH PLATELET    Narrative: The following orders were created for panel order CBC WITH DIFFERENTIAL WITH PLATELET.   Procedure by mouth every 4 (four) hours as needed for Nausea.   Qty: 10 tablet Refills: 0

## 2018-07-14 NOTE — PROGRESS NOTES
ICU  Critical Care APRN Progress Note    NAME: Cassandra Field - ROOM: [address] [place] [address] - MRN: [id number] - Age: 57year old - :1970    History Of Present Illness:  Cassandra Field is a 57year old male with PMHx significant for recurrent alcohol abu to hands, appears stated age  Neck: No JVD, neck supple, no adenopathy, trachea midline, no carotid bruits  Lungs: Clear to auscultation bilaterally, respirations unlabored  Heart: Regular rate and rhythm, S1 and S2 normal, no murmur, rub or gallop  [place]  07/14/2018   CA 8.2 07/14/2018   ALB 3.9 07/13/2018   ALKPHO 144 07/13/2018   BILT 0.2 07/13/2018   TP 9.0 07/13/2018   AST 32 07/13/2018   ALT 24 07/13/2018     Assessment/Plan:  1.   Alcohol abuse--currently in withdrawal  -[facility] order set  -[facility]

## 2018-07-15 NOTE — PROGRESS NOTES
· [facility] Card[facility] Progress Note     Subjective:  Drowsy but arousable.   Reviewed with RN - confusion and hallucinations overnight requiring Precedex now weaned off    Objective:  SR  Afebrile  106/81  I/O + 1200      Cardiac: S1 S2 regular  Lungs:

## 2018-07-15 NOTE — PROGRESS NOTES
Pulmonary Progress Note     Assessment / Plan:  1. Etoh withdrawal  - CIWA  - wean precedex  - start scheduled librium  - ativan prn  - MVI, thiamine, folic acid  2. Pancreatitis  - IVFs  - pain control  - adat  3. Afib - now in NSR  - per cards  4.  Depres

## 2018-07-15 NOTE — PLAN OF CARE
DRUG ABUSE/DETOX    • Will have no detox symptoms and will verbalize plan for changing drug-related behavior Progressing          PAIN - ADULT    • Verbalizes/displays adequate comfort level or patient's stated pain goal Progressing          GASTROINTESTIN

## 2018-07-15 NOTE — PROGRESS NOTES
DEVENDRA HOSPITALIST  Progress Note     Robertpablo Gale Patient Status:  Inpatient    1970 MRN [id number]   [facility] 4SW-A Attending Renny Colbert MD   Hosp Day # 1 PCP Ellen Cool DO     Chief Complaint: abdominal pain    S: Pa 168 hours. Imaging: Imaging data reviewed in Epic.     Medications:   • ChlordiazePOXIDE HCl  10 mg Oral TID   • Dicyclomine HCl  10 mg Oral TID   • Metoprolol Tartrate  50 mg Oral 2x Daily(Beta Blocker)   • Enalapril Maleate  20 mg Oral Daily   • d

## 2018-07-16 NOTE — PLAN OF CARE
DRUG ABUSE/DETOX    • Will have no detox symptoms and will verbalize plan for changing drug-related behavior Progressing        GASTROINTESTINAL - ADULT    • Minimal or absence of nausea and vomiting Progressing    • Maintains adequate nutritional intake (

## 2018-07-16 NOTE — PROGRESS NOTES
DEVENDRA HOSPITALIST  Progress Note     Queenie Corona Patient Status:  Inpatient    1970 MRN [id number]   [facility] 4SW-A Attending Litzy Buchanan MD   Hosp Day # 2 PCP Aldo Franco DO     Chief Complaint: fatigue    S: Patient d 21   --   19   BILT  0.2   --   0.3   --   0.4   TP  9.0*   --   8.4*   --   6.4    < > = values in this interval not displayed. Estimated Creatinine Clearance: 124.8 mL/min (A) (based on SCr of 0.68 mg/dL (L)).     Recent Labs   Lab  07/14/18   8129

## 2018-07-16 NOTE — PROGRESS NOTES
Pulmonary Progress Note        NAME: Rafiq Mary - ROOM: 473/473-A - MRN: [id number] - Age: 57year old - : 1970        SUBJECTIVE: No events overnight, feeling well today    OBJECTIVE:   18  0500 18  0600 18  0700 18 INR  --  1.15*  --   --   --          Recent Labs   07/13/18  2149 07/14/18  0147 07/14/18  0434 07/15/18  0442 07/16/18  0433    142 141 135* 138   K 4.3 4.2 4.2 3.8 4.2  4.2    107 106 105 103   CO2 17.0* 17.0* 16.0* 23.0 25.0   BUN 18 16 1

## 2018-07-16 NOTE — PAYOR COMM NOTE
--------------  ADMISSION REVIEW     Payor: Obie Tinsley #:  [id number]  Authorization Number: [id number]    Admit date: 7/14/18  Admit time: 3161       Admitting Physician: Felisha Ferguson MD  Attending Physician: [id number]  Pulse: (!) 129  Resp: 19  Temp: 98 °F (36.7 °C)  Temp src: Temporal  SpO2: 96 %  O2 Device: None (Room air)    Current:BP (!) 161/108   Pulse 101   Temp 98 °F (36.7 °C) (Temporal)   Resp 15   Ht 168.9 cm (5' 6.5\")   Wt 74.8 kg   SpO2 99%   BMI 26. Lymphocyte Absolute 4.43 (*)     All other components within normal limits   CBC WITH DIFFERENTIAL WITH PLATELET     Medications   sodium chloride 0.9% IV bolus 1,000 mL (0 mL Intravenous Stopped 7/14/18 0002)   sodium chloride 0.9% IV bolus 1,000 mL (0 mL chest pain or shortness of breath. He denies dysuria or hematuria. Review of Systems:   A comprehensive 14 point review of systems was completed. Pertinent positives and negatives noted in the HPI.     Past Medical History:  As noted above in ED MD MEYER daily. Disp: 30 tablet Rfl: 0   multivitamin Oral Tab Take 1 tablet by mouth daily.  Disp: 30 tablet Rfl: 0     Physical Exam:    BP (!) 169/101   Pulse 112   Temp 98 °F (36.7 °C) (Temporal)   Resp 20   Ht 168.9 cm (5' 6.5\")   Wt 165 lb (74.8 kg)   SpO2 10 given  Stat EKG- afib RVR 170s  cardizem bolus  cardizem drip  Cardiology consult  ECHO  Starting Amio per Cardiology     Critical care time 45 minutes    Quality:  · DVT Prophylaxis: lovenox  · CODE status: full  · Kilpatrick: no  Plan of care discussed with p Enoxaparin Sodium (LOVENOX) 40 MG/0.4ML injection 40 mg     Date Action Dose Route User    7/16/2018 0856 Given 40 mg Subcutaneous (Left Lower Abdomen) Inez Chen, RN      HydrOXYzine HCl (ATARAX) tab 50 mg     Date Action Dose Route User    7/16/20

## 2018-07-17 ENCOUNTER — TELEPHONE (OUTPATIENT)
Dept: FAMILY MEDICINE CLINIC | Facility: CLINIC | Age: 48
End: 2018-07-17

## 2018-07-17 NOTE — PLAN OF CARE
CARDIOVASCULAR - ADULT    • Maintains optimal cardiac output and hemodynamic stability Adequate for Discharge    • Absence of cardiac arrhythmias or at baseline Adequate for Discharge        DRUG ABUSE/DETOX    • Will have no detox symptoms and will verbal

## 2018-07-17 NOTE — PROGRESS NOTES
NURSING DISCHARGE NOTE    Discharged Home via Ambulatory. Accompanied by Family member  Belongings Taken by patient/family. AVS and follow up given and reviewed with patient, verbalized understanding.

## 2018-07-17 NOTE — PROGRESS NOTES
DEVENDRA HOSPITALIST  Progress Note     Renitafran Jordan Patient Status:  Inpatient    1970 MRN [id number]   [facility] 4SW-A Attending Leeroy Taylor MD   Hosp Day # 3 PCP Cristel Vela DO     Chief Complaint: fatigue    S: denies ab not displayed. Estimated Creatinine Clearance: 122.5 mL/min (A) (based on SCr of 0.68 mg/dL (L)). Recent Labs   Lab  07/14/18   0434   PTP  15.2*   INR  1.15*       No results for input(s): TROP, CK in the last 168 hours.          Imaging: Imaging

## 2018-07-17 NOTE — PLAN OF CARE
Patient is A&Ox4; a bit anxious  Denies any pain or discomfort  No n/v/d  NSR on tele; on room air  Accucheck QID - insulin coverage given  Up with standby assist  Possible d/c in the am   Will continue to monitor      CARDIOVASCULAR - ADULT    • Maintains

## 2018-07-17 NOTE — TELEPHONE ENCOUNTER
Kulwant Hewitt from [facility] called and advised that your patient Renetta Almanza has been admitted to the hospital since 7/13.

## 2018-07-17 NOTE — PROGRESS NOTES
DEVENDRA HOSPITALIST  Progress Note     Lin Viniciodanielle Patient Status:  Inpatient    1970 MRN [id number]   [facility] 4SW-A Attending Edmundo Griffin MD   Hosp Day # 3 PCP Ginger Sharma DO     Chief Complaint: fatigue    S: Patient r AST  32   --   26   --   23   ALT  24   --   21   --   19   BILT  0.2   --   0.3   --   0.4   TP  9.0*   --   8.4*   --   6.4    < > = values in this interval not displayed.        Estimated Creatinine Clearance: 122.5 mL/min (A) (based on SCr of 0.68 mg/

## 2018-07-17 NOTE — CONSULTS
[facility]  Report of Psychiatric Consultation    Deysi Sandra Patient Status:  Inpatient    1970 MRN [id number]   [facility] 3NE-A Attending Yenni Langford MD   [facility] Day # 3 PCP Renée Grayson DO     Date of Admission:  fidelina and has been drinking 1/5 gallon daily the past 3 days prior to admission. He says he was \"mostly sober\" the past 2 months, with more days not drinking than drinking. He is working part time as a .  He says he plans to go to some AA meeting Problem Relation Age of Onset   • Heart Attack Father 50   • Thyroid Disorder Mother       reports that he has never smoked. He has never used smokeless tobacco. He reports that he drinks about 3.6 oz of alcohol per week .  He reports that he does not use Intravenous, Q2H PRN **OR** morphINE sulfate (PF) 4 MG/ML injection 2 mg, 2 mg, Intravenous, Q2H PRN **OR** morphINE sulfate (PF) 4 MG/ML injection 4 mg, 4 mg, Intravenous, Q2H PRN  •  ondansetron HCl (ZOFRAN) injection 4 mg, 4 mg, Intravenous, Q6H PRN  •

## 2018-07-17 NOTE — TELEPHONE ENCOUNTER
Noted below- per hospitalist PA's note patient to be discharged today. Pt has appt 7/19/18 with Dr. Kelsy Mcelroy.

## 2018-07-17 NOTE — DISCHARGE SUMMARY
[facility] PS[facility] [place] HOSPITALIST  DISCHARGE SUMMARY     Lin Byrd Patient Status:  Inpatient    1970 MRN [id number]   [facility] 3NE-A Attending Edmundo Griffin MD   [facility] Day # 3 PCP Ginger Sharma DO     Date of Admission: 2018  Date o was advanced as tolerated once pain improved. He continued on hyperglycemia protocol during the stay. His A1c is 11.1%. He had A. fib with RVR which resolved returned to normal sinus rhythm.   Cardiology did evaluate during the stay and no anticoagulatio 0     folic acid 1 MG Tabs  Commonly known as:  FOLVITE      Take 1 tablet (1 mg total) by mouth daily.    Quantity:  30 tablet  Refills:  2     HydrOXYzine HCl 50 MG Tabs  Commonly known as:  ATARAX      Take 1 tablet (50 mg total) by mouth 3 (three) times Jesus Manuel Saunders   7/19/2018 9:00 AM Le Eugene DO [facility] 11 [facility] [place]       -----------------------------------------------------------------------------------------------  PATIENT DISCHARGE INSTRUCTIONS: See electronic chart    JENNY Delgadillo 7/1

## 2018-07-18 ENCOUNTER — TELEPHONE (OUTPATIENT)
Dept: FAMILY MEDICINE CLINIC | Facility: CLINIC | Age: 48
End: 2018-07-18

## 2018-07-18 ENCOUNTER — PATIENT OUTREACH (OUTPATIENT)
Dept: CASE MANAGEMENT | Age: 48
End: 2018-07-18

## 2018-07-18 DIAGNOSIS — I10 UNCONTROLLED HYPERTENSION: ICD-10-CM

## 2018-07-18 NOTE — TELEPHONE ENCOUNTER
Contacted pt for condition update. Pt originally had appt scheduled for 07/19/18,  however during call pt requested to cancel and schedule appt for next week. Attempted to reschedule however unable to due to schedule limitations.   Appt for tomorrow has b

## 2018-07-18 NOTE — PROGRESS NOTES
Condition Update Post Discharge   Discharge Date: 7/17/18  Contact Date: 7/18/2018    Consent Verification:  Assessment Completed With: Patient  HIPAA Verified? Yes    General:   • How have you been since your discharge from the hospital/facility?  Elian Sawant Tablet Dispersible Take 1 tablet (4 mg total) by mouth every 4 (four) hours as needed for Nausea.  Disp: 10 tablet Rfl: 0   TRAMADOL HCL 50 MG Oral Tab TAKE 1 TABLET BY MOUTH EVERY 8 HOURS AS NEEDED FOR PAIN Disp: 60 tablet Rfl: 0   Ondansetron HCl (ZOFRAN) PCP.  • Did you  your discharge medications when you left the hospital? Yes  • Are there any reasons that keep you from taking your medication as prescribed? No      Referrals/orders at D/C:  Home Health ordered at D/C?   Not Applicable   DME ordered

## 2018-07-19 ENCOUNTER — OFFICE VISIT (OUTPATIENT)
Dept: FAMILY MEDICINE CLINIC | Facility: CLINIC | Age: 48
End: 2018-07-19
Payer: MEDICAID

## 2018-07-19 ENCOUNTER — DOCUMENTATION ONLY (OUTPATIENT)
Dept: FAMILY MEDICINE CLINIC | Facility: CLINIC | Age: 48
End: 2018-07-19

## 2018-07-19 VITALS
BODY MASS INDEX: 26.28 KG/M2 | SYSTOLIC BLOOD PRESSURE: 140 MMHG | WEIGHT: 165.5 LBS | DIASTOLIC BLOOD PRESSURE: 90 MMHG | HEIGHT: 66.5 IN | HEART RATE: 80 BPM | TEMPERATURE: 98 F

## 2018-07-19 DIAGNOSIS — K85.20 ALCOHOL-INDUCED ACUTE PANCREATITIS WITHOUT INFECTION OR NECROSIS: ICD-10-CM

## 2018-07-19 DIAGNOSIS — K86.1 ACUTE ON CHRONIC PANCREATITIS (HCC): ICD-10-CM

## 2018-07-19 DIAGNOSIS — F10.20 SEVERE ALCOHOL USE DISORDER (HCC): ICD-10-CM

## 2018-07-19 DIAGNOSIS — K70.30 ALCOHOLIC CIRRHOSIS OF LIVER WITHOUT ASCITES (HCC): ICD-10-CM

## 2018-07-19 DIAGNOSIS — I48.0 PAF (PAROXYSMAL ATRIAL FIBRILLATION) (HCC): ICD-10-CM

## 2018-07-19 DIAGNOSIS — Z79.4 TYPE 2 DIABETES MELLITUS WITH HYPERGLYCEMIA, WITH LONG-TERM CURRENT USE OF INSULIN (HCC): ICD-10-CM

## 2018-07-19 DIAGNOSIS — E11.65 TYPE 2 DIABETES MELLITUS WITH HYPERGLYCEMIA, WITH LONG-TERM CURRENT USE OF INSULIN (HCC): ICD-10-CM

## 2018-07-19 DIAGNOSIS — E11.9 DIABETES MELLITUS TYPE 2 IN NONOBESE (HCC): ICD-10-CM

## 2018-07-19 DIAGNOSIS — I10 BENIGN ESSENTIAL HTN: ICD-10-CM

## 2018-07-19 DIAGNOSIS — Z09 HOSPITAL DISCHARGE FOLLOW-UP: Primary | ICD-10-CM

## 2018-07-19 DIAGNOSIS — K85.90 ACUTE ON CHRONIC PANCREATITIS (HCC): ICD-10-CM

## 2018-07-19 PROCEDURE — 99215 OFFICE O/P EST HI 40 MIN: CPT | Performed by: FAMILY MEDICINE

## 2018-07-19 PROCEDURE — 1111F DSCHRG MED/CURRENT MED MERGE: CPT | Performed by: FAMILY MEDICINE

## 2018-07-19 RX ORDER — LANCETS 33 GAUGE
1 EACH MISCELLANEOUS
Qty: 1 BOX | Refills: 1 | Status: SHIPPED | OUTPATIENT
Start: 2018-07-19 | End: 2019-01-01

## 2018-07-19 RX ORDER — ENALAPRIL MALEATE 20 MG/1
TABLET ORAL
Qty: 30 TABLET | Refills: 0 | OUTPATIENT
Start: 2018-07-19

## 2018-07-19 RX ORDER — ENALAPRIL MALEATE 20 MG/1
20 TABLET ORAL
Qty: 30 TABLET | Refills: 0 | Status: SHIPPED | OUTPATIENT
Start: 2018-07-19 | End: 2018-08-18

## 2018-07-19 RX ORDER — TRAMADOL HYDROCHLORIDE 50 MG/1
TABLET ORAL
Qty: 60 TABLET | Refills: 1 | Status: SHIPPED | OUTPATIENT
Start: 2018-07-19 | End: 2018-01-01

## 2018-07-19 NOTE — PAYOR COMM NOTE
--------------  DISCHARGE REVIEW    Payor: Obie Tinsley #:  [id number]  Authorization Number: [id number]    Admit date: 7/14/18  Admit time:  [address]  Discharge Date: 7/17/2018  2:44 PM     Admitting Physician: Raudel Trevizo, NSR    History of Present Illness: Merline Thomason is a 57year old male presents with abdominal pain that started this evening. Patient states in the epigastric region and 9/10. He had episodes of nausea with vomiting.   He is a chronic alcoholic and ZOLOFT  Start taking on:  7/18/2018  What changed:  · medication strength  · how much to take      Take 1 tablet (100 mg total) by mouth daily.    Quantity:  30 tablet  Refills:  0        CONTINUE taking these medications      Instructions Prescription deta Thiamine HCl 100 MG Tabs      Take 1 tablet (100 mg total) by mouth daily.    Quantity:  30 tablet  Refills:  0     TraMADol HCl 50 MG Tabs  Commonly known as:  ULTRAM      TAKE 1 TABLET BY MOUTH EVERY 8 HOURS AS NEEDED FOR PAIN   Quantity:  60 tablet  Re

## 2018-07-19 NOTE — PROGRESS NOTES
HPI:    Joe García is a 57year old male here today for hospital follow up.    He was discharged from Inpatient hospital, [facility] to Home   Admit Date: 7/13/18    Discharge Date: 7/17/18  Hospital Discharge Diagnosis:    Pancreatitis; alco that he is going into the hospital due to the use of alcohol. We also discussed the damage that is happening to his body due to alcohol use. We discussed why he has issues with his blood sugar due to his chronic pancreatitis.   Discussed the importance of total) by mouth daily. No current facility-administered medications on file prior to visit.        HISTORY: reconciled and reviewed with patient  He  has a past medical history of Back problem; Depression; Diabetes ([address] ETOH abuse; High blood pressure apparent distress  SKIN: no rashes, no suspicious lesions  HEENT: atraumatic, normocephalic, ears and throat are clear  EYES: PERRLA, EOMI, conjunctiva are clear  NECK: supple, no adenopathy, no bruits; no thyromegaly  CHEST: no chest tenderness  LUNGS: cl Blood Sugar 4 to 5 times daily before meals, at bedtime and for signs, symptoms of hypoglycemia or as ordered by doctor. -     ONETOUCH DELICA LANCETS 18C Does not apply Misc; 1 lancet by Finger stick route 5 x daily.  Test Blood Glucose 4 to 5 times daily to 100 mg. Patient is thinking about abstaining from alcohol. Patient does have a friend who is in [place] and told him to call him whenever he thinks he needs to  the bottle and rather call him instead. We will restart his insulin.   Increased his ins

## 2018-07-20 ENCOUNTER — TELEPHONE (OUTPATIENT)
Dept: FAMILY MEDICINE CLINIC | Facility: CLINIC | Age: 48
End: 2018-07-20

## 2018-07-20 NOTE — TELEPHONE ENCOUNTER
Paula Dutton Cross [facility] called and advised that Renetta Almanza was discharged from [facility] on 7/17/18.

## 2018-07-22 RX ORDER — DIVALPROEX SODIUM 500 MG/1
TABLET, EXTENDED RELEASE ORAL
Qty: 60 TABLET | Refills: 0 | Status: SHIPPED | OUTPATIENT
Start: 2018-07-22 | End: 2018-08-18

## 2018-07-23 NOTE — TELEPHONE ENCOUNTER
I have filled this for him now, but he needs to follow up with neuro due to his seizures -- Dr. Félix Hill

## 2018-08-09 RX ORDER — INSULIN ASPART 100 [IU]/ML
INJECTION, SOLUTION INTRAVENOUS; SUBCUTANEOUS
Qty: 1 VIAL | Refills: 0 | Status: SHIPPED | OUTPATIENT
Start: 2018-08-09 | End: 2018-08-10 | Stop reason: DRUGHIGH

## 2018-08-09 NOTE — TELEPHONE ENCOUNTER
Tanner Velazquez from [facility] pharmacy calling regarding pt's insulin that was sent today. insulin aspart 100 UNIT/ML Subcutaneous Solution 1 vial 0 8/9/2018    Sig :  Inject 1 unit of insulin for every 50 points greater than 150.        Insurance will no longe

## 2018-08-09 NOTE — TELEPHONE ENCOUNTER
Pharm wants to clarify the order sent---    insulin aspart 100 UNIT/ML Subcutaneous Solution 1 vial 0 8/9/2018    Sig :  Inject 1 unit of insulin for every 50 points greater than 150.      Route:   (none)     Order #:   [id number]       AND     insulin glarg

## 2018-08-10 RX ORDER — INSULIN LISPRO 100 [IU]/ML
INJECTION, SOLUTION INTRAVENOUS; SUBCUTANEOUS
Qty: 1 VIAL | Refills: 3 | Status: SHIPPED | OUTPATIENT
Start: 2018-08-10 | End: 2018-01-01

## 2018-08-16 ENCOUNTER — TELEPHONE (OUTPATIENT)
Dept: FAMILY MEDICINE CLINIC | Facility: CLINIC | Age: 48
End: 2018-08-16

## 2018-08-18 DIAGNOSIS — I10 BENIGN ESSENTIAL HTN: ICD-10-CM

## 2018-08-19 RX ORDER — ENALAPRIL MALEATE 20 MG/1
TABLET ORAL
Qty: 30 TABLET | Refills: 1 | Status: SHIPPED | OUTPATIENT
Start: 2018-08-19 | End: 2018-01-01

## 2018-08-19 RX ORDER — DIVALPROEX SODIUM 500 MG/1
TABLET, EXTENDED RELEASE ORAL
Qty: 60 TABLET | Refills: 1 | Status: SHIPPED | OUTPATIENT
Start: 2018-08-19 | End: 2018-01-01

## 2018-10-10 NOTE — TELEPHONE ENCOUNTER
Nicanor called from [facility]. Pt requesting pen vs vials for the humalog. Insurance will cover admelog. Denies him needing any needles, just the pens. I have pended if you agree. Thanks!

## 2018-10-10 NOTE — PROGRESS NOTES
HPI:   Dayne Li is a 57year old male who presents for recheck of his diabetes. Patient’s FBS have been 260-400. Last visit with ophthalmologist was 4 years ago. Pt.has been checking his feet on a regular basis.  Pt denies any tingling of the feet 60 tablet Rfl: 1   ENALAPRIL MALEATE 20 MG Oral Tab TAKE 1 TABLET(20 MG) BY MOUTH EVERY DAY Disp: 30 tablet Rfl: 1   insulin glargine (BASAGLAR KWIKPEN) 100 UNIT/ML Subcutaneous Solution Pen-injector Inject into skin subcutaneously 15 units twice daily Dis capsule Rfl: 1   Thiamine HCl 100 MG Oral Tab Take 1 tablet (100 mg total) by mouth daily. Disp: 30 tablet Rfl: 0   multivitamin Oral Tab Take 1 tablet by mouth daily. Disp: 30 tablet Rfl: 0     No current facility-administered medications for this visit. rashes,no suspicious lesions  NECK: supple,no adenopathy,no bruits; no thyromegaly  LUNGS: clear to auscultation bilaterally; no rhonchi, rales or wheezing  CARDIO: RRR without murmur  GI: good BS's,soft, non-tender, nondistended, no masses, HSM   EXTREMIT tablet Rfl: 0   TraMADol HCl 50 MG Oral Tab TAKE 1 TABLET BY MOUTH EVERY 12 HOURS AS NEEDED FOR PAIN Disp: 60 tablet Rfl: 0   DIVALPROEX SODIUM  MG Oral Tablet 24 Hr TAKE 1 TABLET BY MOUTH TWICE DAILY, IN THE AFTERNOON AND EVENING Disp: 60 tablet Rfl PRESERVATIVE FREE 0.5 ML      Meds & Refills for this Visit:  Requested Prescriptions     Signed Prescriptions Disp Refills   • insulin glargine (BASAGLAR KWIKPEN) 100 UNIT/ML Subcutaneous Solution Pen-injector 15 mL 2     Sig: Inject into skin subcutaneou

## 2018-11-02 ENCOUNTER — HOSPITAL (OUTPATIENT)
Dept: OTHER | Age: 48
End: 2018-11-02
Attending: HOSPITALIST

## 2018-11-02 LAB
ALBUMIN SERPL-MCNC: 3.6 GM/DL (ref 3.6–5.1)
ALP SERPL-CCNC: 192 UNIT/L (ref 45–117)
ALT SERPL-CCNC: 32 UNIT/L
AMPHETAMINES UR QL SCN>500 NG/ML: NEGATIVE
ANALYZER ANC (IANC): ABNORMAL
ANION GAP SERPL CALC-SCNC: 17 MMOL/L (ref 10–20)
AST SERPL-CCNC: 75 UNIT/L
BARBITURATES UR QL SCN>200 NG/ML: NEGATIVE
BASOPHILS # BLD: 0 THOUSAND/MCL (ref 0–0.3)
BASOPHILS NFR BLD: 0 %
BENZODIAZ UR QL SCN>200 NG/ML: NEGATIVE
BILIRUB CONJ SERPL-MCNC: 0.2 MG/DL (ref 0–0.2)
BILIRUB SERPL-MCNC: 0.4 MG/DL (ref 0.2–1)
BUN SERPL-MCNC: 8 MG/DL (ref 6–20)
BUN/CREAT SERPL: 11 (ref 7–25)
BZE UR QL SCN>150 NG/ML: NEGATIVE
CALCIUM SERPL-MCNC: 9.5 MG/DL (ref 8.4–10.2)
CANNABINOIDS UR QL SCN>50 NG/ML: NEGATIVE
CHLORIDE: 93 MMOL/L (ref 98–107)
CO2 SERPL-SCNC: 28 MMOL/L (ref 21–32)
CREAT SERPL-MCNC: 0.74 MG/DL (ref 0.67–1.17)
DIFFERENTIAL METHOD BLD: ABNORMAL
EOSINOPHIL # BLD: 0 THOUSAND/MCL (ref 0.1–0.5)
EOSINOPHIL NFR BLD: 0 %
ERYTHROCYTE [DISTWIDTH] IN BLOOD: 16.7 % (ref 11–15)
ETHANOL SERPL-MCNC: NORMAL MG/DL
GLUCOSE BLDC GLUCOMTR-MCNC: 181 MG/DL (ref 65–99)
GLUCOSE BLDC GLUCOMTR-MCNC: 285 MG/DL (ref 65–99)
GLUCOSE SERPL-MCNC: 367 MG/DL (ref 65–99)
HEMATOCRIT: 39.8 % (ref 39–51)
HGB BLD-MCNC: 12.7 GM/DL (ref 13–17)
LYMPHOCYTES # BLD: 1.9 THOUSAND/MCL (ref 1–4.8)
LYMPHOCYTES NFR BLD: 20 %
MAGNESIUM SERPL-MCNC: 1.2 MG/DL (ref 1.7–2.4)
MCH RBC QN AUTO: 25.3 PG (ref 26–34)
MCHC RBC AUTO-ENTMCNC: 31.9 GM/DL (ref 32–36.5)
MCV RBC AUTO: 79.4 FL (ref 78–100)
MONOCYTES # BLD: 0.3 THOUSAND/MCL (ref 0.3–0.9)
MONOCYTES NFR BLD: 4 %
NEUTROPHILS # BLD: 6.9 THOUSAND/MCL (ref 1.8–7.7)
NEUTROPHILS NFR BLD: 76 %
NEUTS SEG NFR BLD: ABNORMAL %
NRBC (NRBCRE): ABNORMAL
OPIATES UR QL SCN>300 NG/ML: NEGATIVE
PCP UR QL SCN>25 NG/ML: NEGATIVE
PHOSPHATE SERPL-MCNC: 4 MG/DL (ref 2.4–4.7)
PLATELET # BLD: 551 THOUSAND/MCL (ref 140–450)
POTASSIUM SERPL-SCNC: 3.8 MMOL/L (ref 3.4–5.1)
PROT SERPL-MCNC: 9.4 GM/DL (ref 6.4–8.2)
RBC # BLD: 5.01 MILLION/MCL (ref 4.5–5.9)
SODIUM SERPL-SCNC: 134 MMOL/L (ref 135–145)
TROPONIN I SERPL HS-MCNC: <0.02 NG/ML
VALPROATE SERPL-MCNC: 17 MCG/ML (ref 50–125)
WBC # BLD: 9.1 THOUSAND/MCL (ref 4.2–11)

## 2018-11-03 ENCOUNTER — DIAGNOSTIC TRANS (OUTPATIENT)
Dept: OTHER | Age: 48
End: 2018-11-03

## 2018-11-03 LAB
ALBUMIN SERPL-MCNC: 2.7 GM/DL (ref 3.6–5.1)
ALBUMIN/GLOB SERPL: 0.6 {RATIO} (ref 1–2.4)
ALP SERPL-CCNC: 154 UNIT/L (ref 45–117)
ALT SERPL-CCNC: 24 UNIT/L
ANALYZER ANC (IANC): ABNORMAL
ANION GAP SERPL CALC-SCNC: 8 MMOL/L (ref 10–20)
AST SERPL-CCNC: 43 UNIT/L
BASOPHILS # BLD: 0 THOUSAND/MCL (ref 0–0.3)
BASOPHILS NFR BLD: 0 %
BILIRUB SERPL-MCNC: 0.6 MG/DL (ref 0.2–1)
BUN SERPL-MCNC: 11 MG/DL (ref 6–20)
BUN/CREAT SERPL: 15 (ref 7–25)
CALCIUM SERPL-MCNC: 8.9 MG/DL (ref 8.4–10.2)
CHLORIDE: 98 MMOL/L (ref 98–107)
CO2 SERPL-SCNC: 30 MMOL/L (ref 21–32)
CREAT SERPL-MCNC: 0.75 MG/DL (ref 0.67–1.17)
DIFFERENTIAL METHOD BLD: ABNORMAL
EOSINOPHIL # BLD: 0.1 THOUSAND/MCL (ref 0.1–0.5)
EOSINOPHIL NFR BLD: 1 %
ERYTHROCYTE [DISTWIDTH] IN BLOOD: 16.5 % (ref 11–15)
GLOBULIN SER-MCNC: 4.8 GM/DL (ref 2–4)
GLUCOSE BLDC GLUCOMTR-MCNC: 108 MG/DL (ref 65–99)
GLUCOSE BLDC GLUCOMTR-MCNC: 116 MG/DL (ref 65–99)
GLUCOSE BLDC GLUCOMTR-MCNC: 125 MG/DL (ref 65–99)
GLUCOSE BLDC GLUCOMTR-MCNC: 158 MG/DL (ref 65–99)
GLUCOSE BLDC GLUCOMTR-MCNC: 164 MG/DL (ref 65–99)
GLUCOSE BLDC GLUCOMTR-MCNC: 64 MG/DL (ref 65–99)
GLUCOSE BLDC GLUCOMTR-MCNC: 75 MG/DL (ref 65–99)
GLUCOSE SERPL-MCNC: 128 MG/DL (ref 65–99)
HEMATOCRIT: 37.3 % (ref 39–51)
HGB BLD-MCNC: 11.8 GM/DL (ref 13–17)
LYMPHOCYTES # BLD: 4.3 THOUSAND/MCL (ref 1–4.8)
LYMPHOCYTES NFR BLD: 48 %
MAGNESIUM SERPL-MCNC: 2 MG/DL (ref 1.7–2.4)
MCH RBC QN AUTO: 24.9 PG (ref 26–34)
MCHC RBC AUTO-ENTMCNC: 31.6 GM/DL (ref 32–36.5)
MCV RBC AUTO: 78.9 FL (ref 78–100)
MONOCYTES # BLD: 0.7 THOUSAND/MCL (ref 0.3–0.9)
MONOCYTES NFR BLD: 8 %
NEUTROPHILS # BLD: 3.9 THOUSAND/MCL (ref 1.8–7.7)
NEUTROPHILS NFR BLD: 43 %
NEUTS SEG NFR BLD: ABNORMAL %
NRBC (NRBCRE): ABNORMAL
PLATELET # BLD: 498 THOUSAND/MCL (ref 140–450)
POTASSIUM SERPL-SCNC: 4.3 MMOL/L (ref 3.4–5.1)
PROT SERPL-MCNC: 7.5 GM/DL (ref 6.4–8.2)
RBC # BLD: 4.73 MILLION/MCL (ref 4.5–5.9)
SODIUM SERPL-SCNC: 132 MMOL/L (ref 135–145)
TROPONIN I SERPL HS-MCNC: <0.02 NG/ML
WBC # BLD: 9 THOUSAND/MCL (ref 4.2–11)

## 2018-11-09 NOTE — TELEPHONE ENCOUNTER
Not protocol medication. LOV :10/10/18 medication follow up diabetes  Last labs done :6/04/18  Next appointment :11/2/18  Please see pending medication. Refill if appropriate.    Last refill:    Date:10/10/18  Amount :60 tablets no refills   Medication: t

## 2018-11-28 PROBLEM — K92.2 GASTROINTESTINAL HEMORRHAGE: Status: ACTIVE | Noted: 2018-01-01

## 2018-11-29 PROBLEM — R94.31 ABNORMAL EKG: Status: ACTIVE | Noted: 2018-01-01

## 2018-11-29 PROBLEM — F10.29 ALCOHOL DEPENDENCE WITH UNSPECIFIED ALCOHOL-INDUCED DISORDER (HCC): Status: ACTIVE | Noted: 2018-01-01

## 2018-11-29 PROBLEM — K92.2 GASTROINTESTINAL HEMORRHAGE, UNSPECIFIED GASTROINTESTINAL HEMORRHAGE TYPE: Status: ACTIVE | Noted: 2018-01-01

## 2018-11-29 NOTE — ED INITIAL ASSESSMENT (HPI)
Pt reports bloody stools and having blood in emesis episodes over the last few days, reports drinking too much over holiday weekend, hx of gastric ulcers, pancreatitis. Reports drinking today as well.

## 2018-11-29 NOTE — H&P
[facility]  History and Physical     Levell Weleetka Patient Status:  Inpatient    1970 MRN [id number]   [facility] 3NE-A Attending Cruz Rodríguez MD   Hosp Day # 0 PCP Rosario Rebolledo DO     Chief Complaint: hematemesis Comment:Itching, dizziness             Itching, dizziness  Phenytoin               ITCHING    Comment:irriatibility    Medications:    No current facility-administered medications on file prior to encounter.    Current Outpatient Medications on File Prior t tablet Rfl: 0   multivitamin Oral Tab Take 1 tablet by mouth daily. Disp: 30 tablet Rfl: 0   ibuprofen (ADVIL) 200 MG Oral Tab Take 800 mg by mouth daily.  Disp:  Rfl:    insulin glargine (BASAGLAR KWIKPEN) 100 UNIT/ML Subcutaneous Solution Pen-injector Inj Lab  11/28/18   2203   GLU  252*   BUN  11   CREATSERUM  0.80   GFRAA  124   GFRNAA  107   CA  8.8   ALB  3.0*   NA  139   K  3.5*   CL  99*   CO2  25.0   ALKPHO  216*   AST  211*   ALT  45   BILT  0.3   TP  9.1*       Estimated Creatinine Clearance: 107

## 2018-11-29 NOTE — PAYOR COMM NOTE
--------------  ADMISSION REVIEW     Payor: Obie Tinsley #:  [id number]  Authorization Number: [id number]      Admit date: 11/29/18  Admit time: 0028       Admitting Physician: Tedd Lesch, MD  Attending Physici Temp 98.4 °F (36.9 °C) (Temporal)   Resp 16   Ht 170.2 cm (5' 7\")   Wt 74.8 kg   SpO2 100%   BMI 25.84 kg/m²      Physical Exam  Constitutional: oriented to person, place, and time, patient is tearful, and in painful distress.    HENT:   Head: Normocephali This appears to be a change from July 14, 2018. MDM   Extensive differential diagnosis was considered for the patient including bleeding gastric ulcer, AVM, esophageal varices, colonic mass, diverticulitis, pancreatitis and other etiology.     Patient do admission in the past including episode of ruptured spleen requiring emergency surgery. Currently c/o epigastric pain. Severe, constant.      Past Medical History:  As noted above in ED MD Assessment     Allergies:   Dilantin                ITCHING    Comme Rfl: 0   multivitamin Oral Tab Take 1 tablet by mouth daily. Disp: 30 tablet Rfl: 0   ibuprofen (ADVIL) 200 MG Oral Tab Take 800 mg by mouth daily.  Disp:  Rfl:    insulin glargine (BASAGLAR KWIKPEN) 100 UNIT/ML Subcutaneous Solution Pen-injector Inject int CREATSERUM  0.80   GFRAA  124   GFRNAA  107   CA  8.8   ALB  3.0*   NA  139   K  3.5*   CL  99*   CO2  25.0   ALKPHO  216*   AST  211*   ALT  45   BILT  0.3   TP  9.1*   Estimated Creatinine Clearance: 107.9 mL/min (based on SCr of 0.8 mg/dL).     Recent (ATIVAN) injection 1 mg     Date Action Dose Route User    11/29/2018 0801 Given Other 1 mg Intravenous Ping Da Silva, JUAN      magnesium oxide (MAG-OX) tab 800 mg     Date Action Dose Route User    11/29/2018 0513 Given 800 mg Oral Sara Kilpatrick, RN      ma 11/29/2018 0255 Given 100 mg Oral Aishwarya Kilpatrick RN      TraMADol HCl (ULTRAM) tab 50 mg     Date Action Dose Route User    11/29/2018 0255 Given 50 mg Oral Aishwarya Kilpatrick RN          REVIEWER COMMENTS:     FOR REVIEW/APPROVAL OF INPT ADMISSION    PLEASE FAX A

## 2018-11-29 NOTE — PROGRESS NOTES
NURSING ADMISSION NOTE      Patient admitted via Cart  Oriented to room. Safety precautions initiated. Bed in low position. Call light in reach. Received patient from ED. Remains A&Ox4. Admission navigator completed.  MD notified of admission and

## 2018-11-29 NOTE — CONSULTS
[facility]                       Gastroenterology [address] [facility] Gastroenterology    Nalini Vann Patient Status:  Inpatient    1970 MRN [id number]   [facility] 3NE-A Attending Jose Rafael Treviño MD   [facility] Daily(Beta Blocker)   QUEtiapine Fumarate (SEROQUEL) tab 100 mg 100 mg Oral Nightly   Sertraline HCl (ZOLOFT) tab 100 mg 100 mg Oral Daily   Thiamine HCl tab 100 mg 100 mg Oral Daily   ondansetron HCl (ZOFRAN) injection 4 mg 4 mg Intravenous Q6H PRN   gluc Once   [COMPLETED] Pantoprazole Sodium (PROTONIX) 40 mg in Sodium Chloride 0.9 % 10 mL IV push 40 mg Intravenous Once   [COMPLETED] potassium chloride IVPB premix 20 mEq 20 mEq Intravenous Once   fentaNYL citrate (SUBLIMAZE) 0.05 MG/ML injection 25 mcg 25 seizures    PE: /82 (BP Location: Right arm)   Pulse 102   Temp 98.6 °F (37 °C) (Oral)   Resp 20   Ht 67\"   Wt 165 lb   SpO2 100%   BMI 25.84 kg/m²   Gen: AAO x 3, able to speak in complete sentences  HENT: NCAT, EOMI, PERRL, oropharynx is clear wit gastritis/esophagitis, MWV, varices or angiodysplasia. Per Op reports in 5/2018 he did not appear to have cirrhosis. 2. Etoh abuse  3. H/o recurrent pancreatitis      Recommendations:   1. NPO  2. PPI BID  3. Recommend EGD.  He understands that the is at

## 2018-11-29 NOTE — PROGRESS NOTES
Had N&V this am- no blood, no BM since admission  C/o mid epigastric to left sided pain- better after ultram  Last drink Wed- having pain w/ without etoh  Not interested in cessation programs might go w/ friend to AA, doesn't want to try anti drinking meds

## 2018-11-29 NOTE — PROGRESS NOTES
Pt appears to be going into withdrawal. Discussed with anesthesia: risks outweigh benefits. Will only consider EGD if he has signs of brisk GI bleeding. PPI BID. Start CLD.      Cyril Odell DO  5:10 PM  11/29/2018  [facility] Gastroenterology  [phone]

## 2018-11-29 NOTE — ED PROVIDER NOTES
Patient Seen in: [facility] Emergency Department    History   Patient presents with:  GI Bleeding (gastrointestinal)    Stated Complaint: GI bleed    HPI    Patient is a 53-year-old male who presents emergency department reporting vomiting blood and distress. Asks me for a drink. HENT:   Head: Normocephalic and atraumatic. Eyes: Pupils are equal, round, and reactive to light. Neck: Normal range of motion. Neck supple.    Cardiovascular: Normal rate, regular rhythm, normal heart sounds and intact Final result                 Please view results for these tests on the individual orders. TYPE AND SCREEN    Narrative: The following orders were created for panel order TYPE AND SCREEN.   Procedure                               Abnormality [facility]    Disposition:  Admit  11/28/2018 10:55 pm    Follow-up:  No follow-up provider specified.       Medications Prescribed:  Current Discharge Medication List        Present on Admission  Date Reviewed: 10/10/2018          ICD-10-CM Noted SHERYL Cervantes Eis

## 2018-11-30 NOTE — PLAN OF CARE
DRUG ABUSE/DETOX    • Will have no detox symptoms and will verbalize plan for changing drug-related behavior Not Progressing        GASTROINTESTINAL - ADULT    • Minimal or absence of nausea and vomiting Not Progressing    • Maintains or returns to baselin

## 2018-11-30 NOTE — PLAN OF CARE
Pt is A/O x 3-4. Seizure precautions no seizure activity  Starting to detox more Friday AM.  CIWA around 6-12. Pt is calm and cooperative but has moments where he is \"seeing things\". CDIFF negative.   No Bloody BM overnight- but noted by RN during day l

## 2018-11-30 NOTE — PAYOR COMM NOTE
--------------  CONTINUED STAY REVIEW    Payor: Obie Tinsley #:  [id number]  Authorization Number: [id number]         11/29:     Late GI Note:  Pt appears to be going into withdrawal. Discussed with anesthesia: ri becomes more agitatd      5. Depression   6. Hypomag- replace/ monitor   7. Essential HTN-  Ace, BB prn meds   8.  DM T2 -  1.  levemir, ISS  2.  accuchecks      MEDICATIONS ADMINISTERED IN LAST 1 DAY:  divalproex Sodium ER (DEPAKOTE) 24 hr tab 500 mg     D 1025 Given 1 mg Intravenous Schuyler Diaz RN    11/30/2018 0849 Given 1 mg Intravenous Schuyler Diaz RN    11/30/2018 8482 Given 1 mg Intravenous [place] JUAN Green    11/30/2018 8868 Given 1 mg Intravenous [place] JUAN Green    11/29/2018 1357 Given

## 2018-11-30 NOTE — PROGRESS NOTES
[facility]  Progress Note     Queenie Corona Patient Status:  Inpatient    1970 MRN [id number]   [facility] 3NE-A Attending Yumiko Bernard MD   Hosp Day # 1 PCP Aldo Franco DO     Chief Complaint:  N&V, BRBPR, etoh withd 11/28/18 2206   PTP  13.9   INR  1.03       Recent Labs   Lab  11/28/18 2203   TROP  <0.046            Imaging: Imaging data reviewed in Epic.     Medications:   • divalproex Sodium ER  500 mg Oral BID   • Enalapril Maleate  20 mg Oral Daily   • folic a Mindy Lea MD

## 2018-11-30 NOTE — CONSULTS
Critical Care H&P/Consult       NAME: Ivelisse Hooper - ROOM: [address] [place] [address] - MRN: [id number] - Age: 57year old - :  1970    Date of Admission: 2018  9:48 PM  Admission Diagnosis: Abnormal EKG [R94.31]  Alcohol dependence with unspecified alco (1 mg total) by mouth daily. Disp: 30 tablet Rfl: 5 Past Week at Unknown time   Metoprolol Tartrate 50 MG Oral Tab Take 1 tablet (50 mg total) by mouth 2 (two) times daily.  Disp: 60 tablet Rfl: 3 Past Week at Unknown time   Needles & Syringes Does not appl Ondansetron HCl (ZOFRAN) 4 mg tablet TAKE 1 TABLET(4 MG) BY MOUTH EVERY 8 HOURS AS NEEDED FOR NAUSEA Disp: 30 tablet Rfl: 0 Unknown at Unknown time   Insulin Lispro (ADMELOG SOLOSTAR) 100 UNIT/ML Subcutaneous Solution Pen-injector Inject 10 units into sk Self-Exams: Not Asked    Social History Narrative      Not on file       Family History:  Family History   Problem Relation Age of Onset   • Heart Attack Father 50   • Heart Disorder Father    • Thyroid Disorder Mother    • Heart Disorder Mother    • Diabe times daily as needed. Disp: 30 capsule Rfl: 1   Thiamine HCl 100 MG Oral Tab Take 1 tablet (100 mg total) by mouth daily. Disp: 30 tablet Rfl: 0   multivitamin Oral Tab Take 1 tablet by mouth daily.  Disp: 30 tablet Rfl: 0       Scheduled Medication:  • Po Resp:   22 15   Temp:       TempSrc:       SpO2:   98% 95%   Weight:       Height:           Oxygen Therapy  SpO2: 95 %  O2 Device: None (Room air)  Pulse Oximetry Type:  Intermittent  Oximetry Probe Site Changed: Yes  Pulse Ox Probe Location: Left hand MCV  76.1*   --   77.7*  77.5*   PLT  348.0   --   306.0  219.0   INR   --   1.03   --    --        Recent Labs      11/28/18   2203  11/29/18   0305  11/30/18   0713   NA  139  138   --    K  3.5*  3.8  3.8   CL  99*  101   --    CO2  25.0  24.0   --

## 2018-11-30 NOTE — PROGRESS NOTES
Pt transferred to ICU- called and gave report to [facility]. Pt was accompanied to unit by myself and charge RN.

## 2018-11-30 NOTE — PROGRESS NOTES
Per hospitalist give Ativan 1mg IV and monitor pt for 1-2 more hours and if no improvement, call back.   Will continue to monitor

## 2018-11-30 NOTE — PROGRESS NOTES
Pt CIWA has been at 10 for 2 hours is not responding well to Ativan 1mg PO or IV. MD notified  Will continue to monitor.

## 2018-11-30 NOTE — DIETARY NOTE
Nutrition Short Note    Pt screened for HgbA1C >8%. Noted that last A1C was drawn 07/2018. Recommend re-draw A1C and RD will follow for education as appropriate.       Godwin President JD, ALEXIN  Pager #[phone]

## 2018-11-30 NOTE — PROGRESS NOTES
Gastroenterology Progress Note  Patient Name: Lyle Chaparro  Chief Complaint: melena, Etoh abuse  S: Pt lethargic after receiving Ativan for Etoh withdrawal. He has not had vomiting today or melena. He had some blood in his stool last night.    O: BP ( ICU      Reji Mora,   1:31 PM  11/30/2018  [facility] Gastroenterology  [phone]

## 2018-12-01 NOTE — PROGRESS NOTES
Pulmonary Progress Note     Assessment / Plan:  1. DTs  - CIWA  - ativan prn  - GLEN to see  2. N/V hematemesis  - suspect it is due to above  - GI following  3. Anemia  - due to above  - monitor counts and transfuse as needed for Hgb <7  4.  FEN  - diet per

## 2018-12-01 NOTE — PROGRESS NOTES
[facility]  Progress Note     Frances Cecilia Patient Status:  Inpatient    1970 MRN [id number]   [facility] 3NE-A Attending Carmen Smith MD   Hosp Day # 2 PCP Reggie Leach DO     Chief Complaint:  N&V, BRBPR, etoh withd Creatinine Clearance: 132.8 mL/min (A) (based on SCr of 0.65 mg/dL (L)). Recent Labs   Lab  11/28/18 2206   PTP  13.9   INR  1.03       Recent Labs   Lab  11/28/18 2203   TROP  <0.046            Imaging: Imaging data reviewed in Epic.     Medications

## 2018-12-01 NOTE — PROGRESS NOTES
Gastroenterology Progress Note  Patient Name: Nalini Vann  Chief Complaint: melena, Etoh abuse  S: Pt reports that he had a BM yesterday that was dark brown. Per nursing, he has not had any melena overnight.    O: /82   Pulse 79   Temp 98.1 °F cirrhosis. 2. Etoh abuse and now in withdrawal  3. H/o recurrent pancreatitis        Recommendations:   1. CLD  2. PPI BID  3.  Pt in withdrawal and cannot have EGD at this time due to risks; will only consider if he is having life threatening bleed or wh

## 2018-12-01 NOTE — PLAN OF CARE
Received patient this a.m. Alert and very cooperative. CIWA -3. No active bleeding. Per MD request send a cdiff. tmax 102.0- blood cultures/urine/CXR/stool to be sent. Waiting on a transfer bed.

## 2018-12-02 NOTE — PROGRESS NOTES
Gastroenterology Progress Note  Patient Name: Lolita La  Chief Complaint: melena, Etoh abuse  S: Pt reports that he had a brown BM. He denies abdominal pain. He denies emesis.    O: BP (!) 154/105 (BP Location: Right arm)   Pulse 84   Temp 99.5 °F MWV, varices or angiodysplasia. Per Op reports in 5/2018 he did not appear to have cirrhosis. 2. Etoh abuse and now in withdrawal: improving  3. H/o recurrent pancreatitis        Recommendations:   1. Advance diet to FLD   2. PPI BID  3.  Pt in withdrawal

## 2018-12-02 NOTE — PROGRESS NOTES
Pulmonary Progress Note     Assessment / Plan:  1. Alcohol withdrawal  - CIWA  - ativan prn  - GLEN to see  2. N/V hematemesis  - suspect it is due to above  - GI following  3. Low grade fever  - follow-up cultures  4.  Anemia  - due to above  - monitor coun

## 2018-12-02 NOTE — PLAN OF CARE
CARDIOVASCULAR - ADULT    • Maintains optimal cardiac output and hemodynamic stability Progressing    • Absence of cardiac arrhythmias or at baseline Progressing        Delirium    • Minimize duration of delirium Progressing        DRUG ABUSE/DETOX    • Wi

## 2018-12-02 NOTE — PROGRESS NOTES
[facility]  Progress Note     Lyle Chaparro Patient Status:  Inpatient    1970 MRN [id number]   [facility] 3NE-A Attending Nina Flannery MD   Hosp Day # 3 PCP Arnold Plummer DO     Chief Complaint:  N&V, BRBPR, etoh withd ALKPHO  216*  177*   --   131*   --    AST  211*  162*   --   35   --    ALT  45  39   --   16*   --    BILT  0.3  0.2   --   0.6   --    TP  9.1*  8.0   --   6.3*   --        Estimated Creatinine Clearance: 125.1 mL/min (A) (based on SCr of 0.69 mg/dL (

## 2018-12-02 NOTE — PLAN OF CARE
METABOLIC/FLUID AND ELECTROLYTES - ADULT    • Electrolytes maintained within normal limits Not Progressing          DRUG ABUSE/DETOX    • Will have no detox symptoms and will verbalize plan for changing drug-related behavior Progressing        Resting in b

## 2018-12-02 NOTE — PROGRESS NOTES
Ongoing diarrhea. CDIFF negative 2 days ago. Patient requesting medication to slow stool. Imodium prn ordered.   [facility] on-call, Dr Astrid Tena, updated and agreed    Patricio Ellis 3 NP  Go 227: [phone]  Pgr: [phone]

## 2018-12-02 NOTE — PLAN OF CARE
NURSING ADMISSION NOTE      Patient admitted via Wheelchair  Oriented to room. Safety precautions initiated. Bed in low position. Call light in reach. Received patient from the ICU. VSS, temp 99.8. He is resting in bed.   Patient a/ox4

## 2018-12-03 NOTE — PROGRESS NOTES
Gastroenterology Progress Note  Rojeliosigifredoarden Toanbo Patient Status:  Inpatient    1970 MRN [id number]   [facility] 3NE-A Attending Joyce Coyne MD   [facility]  2*   --    CREATSERUM  0.80  0.69*   --   0.65*  0.69*   --        Recent Labs   Lab  11/28/18   2203  11/29/18   0305  12/01/18   0606   ALT  45  39  16*   AST  211*  162*  35     Assessment: 57 yr-old male with a hx of HTN, EtOH abuse, pancreatitis,

## 2018-12-03 NOTE — PLAN OF CARE
A/O x 4. Some baseline tremors from withdrawal. Pt is not confused at this time.  Alert/calm/cooperative  Seizure precautions maintained with no seizure activity  No blood in stool per patient and staff  Tele NSR-  HTN- Labetolol PRN and hydralazine PRN   I

## 2018-12-03 NOTE — ANESTHESIA PREPROCEDURE EVALUATION
PRE-OP EVALUATION    Patient Name: Morris Hernandez    Pre-op Diagnosis: Melena [K92.1]  Coffee ground emesis [K92.0]  ETOH abuse [F10.10]  Iron deficiency anemia, unspecified iron deficiency anemia type [D50.9]    Procedure(s):  ESOPHAGOGASTRODUODENOSCO mL Intravenous Q15 Min PRN   Or      glucose (DEX4) oral liquid 30 g 30 g Oral Q15 Min PRN   Or      Glucose-Vitamin C (DEX-4) 4-6 GM-MG chewable tab 8 tablet 8 tablet Oral Q15 Min PRN   acetaminophen (TYLENOL) tab 650 mg 650 mg Oral Q6H PRN   Metocloprami 1 Box Rfl: 0   Glucose Blood (ONETOUCH ULTRA BLUE) In Vitro Strip Test Blood Sugar 4 to 5 times daily before meals, at bedtime and for signs, symptoms of hypoglycemia or as ordered by doctor.  Disp: 100 strip Rfl: 1   ONETOUCH DELICA LANCETS 49R Does not ap Jj Ryan MD 10/11/2018 7:52  t   [id number] 10/11/2018 10:48 AM #[id number]    ----------------    7/2018:    1. Left ventricle: The cavity size was normal. Wall thickness was normal.     Systolic function was vigorous.  The estimated ejection fraction was Ileus ([facility] [address]     Other acute appendicitis     Anxiety disorder     Diabetes mellitus type 2 in nonobese (HCC)     Hyponatremia     Type 2 diabetes mellitus without complication, with long-term current use of insulin (HCC)     Hypokalemia     Seizure disorde Date     (L) 12/03/2018    K 3.9 12/03/2018    CL 99 (L) 12/03/2018    CO2 26.0 12/03/2018    BUN 1 (L) 12/03/2018    CREATSERUM 0.75 12/03/2018     (H) 12/03/2018    CA 7.6 (L) 12/03/2018     Lab Results   Component Value Date    INR 1.03 11/

## 2018-12-03 NOTE — PROGRESS NOTES
DEVENDRA HOSPITALIST  Progress Note     Yvonne Jimenez Patient Status:  Inpatient    1970 MRN [id number]   [facility] 3NE-A Attending Maged Gaines MD   Hosp Day # 4 PCP Ginger Mcpherson DO     Chief Complaint:  N&V, BRBPR, etoh withd 132   --   127   GFRNAA  107  114   --   117  114   --   110   CA  8.8  8.0*   --   7.9*  7.9*   --   7.6*   ALB  3.0*  2.6*   --   1.9*   --    --    --    NA  139  138   --   134*  131*   --   131*   K  3.5*  3.8   < >  3.9  3.9  3.6  4.3  3.9   CL  99* eelvate r arm   Midline placed in left arm  Labs in am       Quality:  · DVT Prophylaxis: scd  · CODE status: full  · Kilpatrick: no    Estimated date of discharge:   tues   Discharge is dependent on: resolution sx   At this point Mr. Bora Chung is expected to b

## 2018-12-04 NOTE — OPERATIVE REPORT
Operative Report-Esophagogastroduodenoscopy      PREOPERATIVE DIAGNOSIS/INDICATION:  1. Melena  2. Coffee ground emesis  3. Chronic blood loss anemia  POSTOPERTATIVE DIAGNOSIS:  1.  Asymmetric gastric fold thickening of proximal sto gastric body/cardia  RECOMMENDATIONS:   - Post EGD precautions, watch for bleeding, infection, perforation, adverse drug reactions   - Follow biopsies.  - Proceed with colonoscopy  - Will likely need CT abdomen/pelvis.  If negative, recommend EUS of proxima

## 2018-12-04 NOTE — PROGRESS NOTES
VSS. A/O x4. Bowel prep complete. NPO at midnight. Stool is clear yellow. Will handoff to oncoming RN.

## 2018-12-04 NOTE — PROGRESS NOTES
DEVENDRA HOSPITALIST  Progress Note     Alex Keys Patient Status:  Inpatient    1970 MRN [id number]   [facility] 3NE-A Attending Jony Holley MD   Hosp Day # 5 PCP Rashida Tomlinson DO     Chief Complaint:  N&V, BRBPR, etoh withd --   110   CA  8.8  8.0*   --   7.9*  7.9*   --   7.6*   ALB  3.0*  2.6*   --   1.9*   --    --    --    NA  139  138   --   134*  131*   --   131*   K  3.5*  3.8   < >  3.9  3.9  3.6  4.3  3.9   CL  99*  101   --   102  99*   --   99*   CO2  25.0  24.0 hyperglycmeic protocol   9. hypomag- replace/ monitor     Resume gen diet  F/u bx, needs EUS  Ct scab abd/pelvis   Poss dc later - needs f/u w/ , PCP, etoh treatment   Bp elevated yet at times     Quality:  · DVT Prophylaxis: scd  · CODE status: full  ·

## 2018-12-04 NOTE — ANESTHESIA POSTPROCEDURE EVALUATION
[address] [place] [address] Patient Status:  Inpatient   Age/Gender 57year old male MRN [id number]   Location [facility]. Attending Roque Dean MD   [facility] Day # 5 PCP Rupa Paulino DO       Anesthesia Post-op Note    Procedure(s)

## 2018-12-04 NOTE — OPERATIVE REPORT
Operative Report-Colonoscopy    PREOPERATIVE DIAGNOSIS/INDICATION:  1. Melena  2. Chronic blood loss anemia  POSTOPERTATIVE DIAGNOSIS:  1. Colonic ulcer in descending colon-ischemic? Stercoral? NSAIDs?   2. T abdomen/pelvis with IV contrast  - If CT negative and tolerating diet, OK for dc home with OP EUS with Dr. Stuart Pierce Report:   Tino Martinez  12/4/2018  9:16 AM

## 2018-12-05 NOTE — PAYOR COMM NOTE
--------------  CONTINUED STAY REVIEW    Payor: Obie Tinsley #:  [id number]  Authorization Number: [id number]      Admit date: 11/29/18  Admit time: 0028    Admitting Physician: Abdoulaye Byrd MD  Attending Physic suspect 2/2 atelectasis- lower suspicion for PNA  1. Hold Abx  2. Aggressive IS   3. F/u Cx. NGTD   May t/f out of ICU  Estimated date of discharge:  tbd  Discharge is dependent on: resolution sx   At this point Mr. Soledad Arce is expected to be discharge to: 12/3/18 1817    HGB 13.0 - 17.0 g/dL 8.4 Abnormally low       Gastroenterology Progress Notes  Plan:   1.ontinue PPI IV BID  2.  Tentatively plan for EGD in AM under MAC with Dr Kaylee Marquis, sooner if large volume bleeding develops or significant anemia; gi Subcutaneous (Left Lower Abdomen) Rodríguez López RN    12/3/2018 2140 Given 8 Units Subcutaneous (Right Lower Abdomen) Amanda Pinedo RN      magnesium oxide (MAG-OX) tab 200 mg     Date Action Dose Route User    12/4/2018 1823 Given 200 mg Oral Hamzah

## 2018-12-05 NOTE — PROGRESS NOTES
Gastroenterology Progress Note  S: No new complaints, feels hungry, wants to eat and go home. States symptoms started shortly after an alcohol binge episode when he was mourning a friend's death  O: /73 (BP Location: Right arm)   Pulse 90   Temp 98. 6 causing extrinsic compression on the proximal stomach seen on EGD. Clinically, he looks well after a week of hydration and bowel rest.   Plan:   1. Advance to low fat diet today and dc if tolerating  2.  Long discussion with pt regarding ramifications of kade

## 2018-12-05 NOTE — PROGRESS NOTES
NURSING DISCHARGE NOTE    Discharged Home via Ambulatory. Accompanied by Family member  Belongings Taken by patient/family. Extended dwell IV discontinued. Discharge instructions, follow up, and medications were discussed with patient.  Patient verb

## 2018-12-05 NOTE — PROGRESS NOTES
DEVENDRA HOSPITALIST  Progress Note     Binu Jones Patient Status:  Inpatient    1970 MRN [id number]   [facility] 3NE-A Attending VAISHNAVI Goodwin MD   Hosp Day # 6 PCP Favian Allen DO     Chief Complaint:  N&V, BRBPR, etoh withdrawal CREATSERUM  0.69*   --   0.65*   < >  0.75  0.77  0.78   GFRAA  132   --   135   < >  127  126  125   GFRNAA  114   --   117   < >  110  109  108   CA  8.0*   --   7.9*   < >  7.6*  8.6  8.2*   ALB  2.6*   --   1.9*   --    --    --   1.9*   NA  138   -- 1. Resolved    4. etoh w/d- resolved tx out of [facility] Sunday      1.  dc'ed CIWA, Ativan    2. Needs treatment  5. Depression   6. Hypomag- replace/ monitor   7. Essential HTN-  Ace, BB prn meds   8.  DM T2 - hyperglycmeic protocol   9. hypomag- replace/ m

## 2018-12-05 NOTE — BH PROGRESS NOTE
Went to see the pt for f/u with his etoh history. Pt admits to drinking whiskey daily. During the week he states he consumes 1 pint a day and on the weekends 1 1/2 pints.   He said, he is not interested right now for a cd php or residential program.  He s

## 2018-12-05 NOTE — PROGRESS NOTES
Results reviewed. Biopsies negative for H.pylori, celiac disease. 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  Letter sent

## 2018-12-05 NOTE — PLAN OF CARE
A/O x4. VSS. Room air. Lungs clear. Seizure precautions in place; on Depakote  PRN tramadol for pain  IVF infusing  Plan: GI following; made NPO yesterday post-results of CT abdomen.  Pending biopsies from EGD/Colonscopy    Denies needs; will continue to m

## 2018-12-05 NOTE — PAYOR COMM NOTE
--------------  CONTINUED STAY REVIEW    Payor: Obie Tinsley #:  [id number]  Authorization Number: [id number]      Admit date: 11/29/18  Admit time: 0028    Admitting Physician: Myles Lua MD  Attending Physic stromal process vs. Post-splenectomy change. The remainder of the gastric mucosa was normal without ulcers, erosions  · DUODENUM:  Small white plaque measuring 5 mm in the descending duodenum, biopsied.  Remainder of the duodenal mucosal normal  THERAPEUTIC ILEUM: Normal to the extent examined  · COLON: Tortuous in course due to peritoneal adhesions. At approximately 50 cm from the anal verge in the proximal descending colon, there was a linear ulcer crater measuring 3 cm without any active bleeding.  The gideon Abdomen) Raj Cruz RN    12/3/2018 2140 Given 8 Units Subcutaneous (Right Lower Abdomen) Claude Estes, RN      magnesium oxide (MAG-OX) tab 200 mg     Date Action Dose Route User    12/4/2018 1823 Given 200 mg Oral Raj Cruz RN    12/4/2018

## 2018-12-06 NOTE — DISCHARGE SUMMARY
DEVENDRA HOSPITALIST  DISCHARGE SUMMARY     Binujaneen Fisherana Patient Status:  Inpatient    1970 MRN [id number]   [facility] 3NE-A Attending No att. providers found   Hosp Day # 6 PCP [place], DO     Date of Admission: 2018 episode of ruptured spleen requiring emergency surgery. Currently c/o epigastric pain. Severe, constant. Brief Synopsis:   Patient had recurrence of hematemesis bright red blood from rectum came to the hospital for evaluation has been drinking heavily. pancreatitis. 2.  Inflammatory changes are seen in the left upper quadrant. There is associated splenic flexure wall thickening and mild duodenal wall thickening which are likely related to the pancreatitis.   3.  There are 3 fluid density collections in PROTONIX      Take 1 tablet (40 mg total) by mouth 2 (two) times daily before meals.    Quantity:  60 tablet  Refills:  0        CONTINUE taking these medications      Instructions Prescription details   Dicyclomine HCl 10 MG Caps  Commonly known as:  Johnie Gill tablet  Refills:  0     ONETOUCH DELICA LANCETS 91J Misc      1 lancet by Finger stick route 5 x daily. Test Blood Glucose 4 to 5 times daily before meals, at bedtime and for signs or symptoms of hypoglycemia or as directed by physician.    Quantity:  1 Box rhonchi. Cardiovascular: S1, S2. Regular rate and rhythm. No murmurs, rubs or gallops. NSR    Abdomen: Soft,sl left sided    Tenderness   , nondistended. Positive bowel sounds. No rebound or guarding. Musculoskeletal: Moves all extremities.   Extremities

## 2018-12-11 NOTE — TELEPHONE ENCOUNTER
Received request for a PA to be done for 2 of pt's medications. Pt no longer has insurance coverage under the state per our office. Called to the pharmacy, spoke with remington Chaudhari. Fara stated that pt has no coverage with them either.    No PA duarte

## 2018-12-12 NOTE — PROGRESS NOTES
HPI:    Binu Jones is a 57year old male here today for hospital follow up.    He was discharged from Inpatient hospital, [facility] to Home   Admit Date: 11/28/18  Discharge Date: 12/5/18  Hospital Discharge Diagnosis:    Gastric hemorrhage morphine; norco [hydrocodone-acetaminophen]; and phenytoin.     Current Meds:    Current Outpatient Medications on File Prior to Visit:  ENALAPRIL MALEATE 20 MG Oral Tab TAKE 1 TABLET(20 MG) BY MOUTH EVERY DAY   DIVALPROEX SODIUM  MG Oral Tablet 24 Hr Take 1 capsule (10 mg total) by mouth 3 (three) times daily as needed. No current facility-administered medications on file prior to visit.        HISTORY: reconciled and reviewed with patient  He  has a past medical history of Back problem, Depression, Location: Right arm, Patient Position: Sitting, Cuff Size: adult)   Pulse 83   Temp 98.2 °F (36.8 °C) (Oral)   Resp 15   Ht 66.5\"   Wt 159 lb   SpO2 100%   BMI 25.28 kg/m²    GENERAL: well developed, well nourished, in no apparent distress  SKIN: no rashe sugar    Other orders  -     Pantoprazole Sodium 40 MG Oral Tab EC; Take 1 tablet (40 mg total) by mouth every morning before breakfast.  -     Ondansetron HCl (ZOFRAN) 4 mg tablet;  Take 1 tablet (4 mg total) by mouth every 8 (eight) hours as needed for Na

## 2018-12-13 NOTE — PAYOR COMM NOTE
--------------  REQUESTING APPROVAL FOR ALL DAYS UP TO ( but not including) 12/5    PLEASE FAX  DAYS AUTHORIZED . Debbie [address] YOU      DISCHARGE REVIEW    Payor: [address] [place] [address]  Subscriber #:  [id number]  Authorization Number: [id number]  11. Hypomagnesia    History of Present Illness:   Connor Montes De Oca is a 57year old male with PMH etoh abuse, dm2, HTN, pacreatitis, seizure d/o who p/w hematemesis. Pt has been increasing his etoh use over the holidays.  Now drinking a little less xiomy status due to his decreased level of consciousness. This has resolved ambulatory without cough or shortness of breath. Discharged home today  Hemoglobin was 7.7 at time of discharge hemoglobin has been stable resolution of bleeding.   Did not receive any outpatient therapy  · Biopsies reviewed by GI negative for H. pylori, celiac disease.   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 Quantity:  60 tablet  Refills:  3     multivitamin Tabs      Take 1 tablet by mouth daily.    Quantity:  30 tablet  Refills:  0     Needles & Syringes Misc Ivett Barksdale DO  [address] Shannon Daughters 22 [phone]    Go on 1/7/2019  for a follow-up with GI at 2 pm     Le Eugene DO  [facility] [phone]    In 1 week        Vital signs:  Temp:  [98.6 °F

## 2018-12-14 NOTE — TELEPHONE ENCOUNTER
Pt sent over new insurance information. Please see pended medication and sign if appropriate.   Thank you

## 2018-12-17 PROBLEM — K92.2 GASTROINTESTINAL HEMORRHAGE: Status: RESOLVED | Noted: 2018-01-01 | Resolved: 2018-01-01

## 2018-12-17 PROBLEM — G40.909 SEIZURE DISORDER (HCC): Status: RESOLVED | Noted: 2017-12-14 | Resolved: 2018-01-01

## 2018-12-17 PROBLEM — E87.6 HYPOKALEMIA: Status: RESOLVED | Noted: 2017-12-14 | Resolved: 2018-01-01

## 2018-12-17 PROBLEM — K86.0 ALCOHOL-INDUCED CHRONIC PANCREATITIS (HCC): Status: ACTIVE | Noted: 2018-01-01

## 2018-12-17 PROBLEM — R73.9 HYPERGLYCEMIA: Status: RESOLVED | Noted: 2018-05-24 | Resolved: 2018-01-01

## 2018-12-17 PROBLEM — E87.29 ALCOHOLIC KETOACIDOSIS: Status: RESOLVED | Noted: 2018-05-24 | Resolved: 2018-01-01

## 2018-12-17 PROBLEM — F10.239 ALCOHOL WITHDRAWAL SYNDROME WITH COMPLICATION (HCC): Status: RESOLVED | Noted: 2018-05-24 | Resolved: 2018-01-01

## 2018-12-17 PROBLEM — I16.0 HYPERTENSIVE URGENCY: Status: RESOLVED | Noted: 2018-02-13 | Resolved: 2018-01-01

## 2018-12-17 PROBLEM — F10.939 ALCOHOL WITHDRAWAL SYNDROME WITH COMPLICATION (HCC): Status: RESOLVED | Noted: 2018-05-24 | Resolved: 2018-01-01

## 2018-12-17 PROBLEM — S36.09XA SPLENIC RUPTURE: Status: RESOLVED | Noted: 2018-04-07 | Resolved: 2018-01-01

## 2018-12-17 PROBLEM — K92.2 GASTROINTESTINAL HEMORRHAGE, UNSPECIFIED GASTROINTESTINAL HEMORRHAGE TYPE: Status: RESOLVED | Noted: 2018-01-01 | Resolved: 2018-01-01

## 2018-12-17 PROBLEM — Z87.19 HISTORY OF GI BLEED: Status: ACTIVE | Noted: 2018-01-01

## 2018-12-17 PROBLEM — R94.31 ABNORMAL EKG: Status: RESOLVED | Noted: 2018-01-01 | Resolved: 2018-01-01

## 2018-12-17 PROBLEM — E87.1 HYPONATREMIA: Status: RESOLVED | Noted: 2017-12-13 | Resolved: 2018-01-01

## 2018-12-17 PROBLEM — R11.2 NON-INTRACTABLE VOMITING WITH NAUSEA, UNSPECIFIED VOMITING TYPE: Status: RESOLVED | Noted: 2018-07-14 | Resolved: 2018-01-01

## 2018-12-17 PROBLEM — E87.2 ALCOHOLIC KETOACIDOSIS: Status: RESOLVED | Noted: 2018-05-24 | Resolved: 2018-01-01

## 2018-12-17 PROBLEM — Z79.4 TYPE 2 DIABETES MELLITUS WITHOUT COMPLICATION, WITH LONG-TERM CURRENT USE OF INSULIN (HCC): Status: RESOLVED | Noted: 2017-12-14 | Resolved: 2018-01-01

## 2018-12-17 PROBLEM — K86.89: Status: ACTIVE | Noted: 2018-01-01

## 2018-12-17 PROBLEM — F10.29 ALCOHOL DEPENDENCE WITH UNSPECIFIED ALCOHOL-INDUCED DISORDER (HCC): Status: RESOLVED | Noted: 2018-01-01 | Resolved: 2018-01-01

## 2018-12-17 PROBLEM — E09.9: Status: ACTIVE | Noted: 2018-01-01

## 2018-12-17 PROBLEM — E11.9 TYPE 2 DIABETES MELLITUS WITHOUT COMPLICATION, WITH LONG-TERM CURRENT USE OF INSULIN (HCC): Status: RESOLVED | Noted: 2017-12-14 | Resolved: 2018-01-01

## 2018-12-19 NOTE — TELEPHONE ENCOUNTER
Fax received from [facility] and family services  Message: your 12/18/2018 request for Prior Authorization is approved Tramadol 50 mg is approved 12/12/2018 through 3/18/2019. Patient notified, voiced understanding.   Tracking number [id number]

## 2018-12-28 NOTE — TELEPHONE ENCOUNTER
Letter of determination received pantoprazole 40mg tabs were denied. Provider comment was that pt should get this medication from his GI provider.

## 2019-01-01 ENCOUNTER — APPOINTMENT (OUTPATIENT)
Dept: GENERAL RADIOLOGY | Facility: HOSPITAL | Age: 49
DRG: 917 | End: 2019-01-01
Attending: NURSE PRACTITIONER
Payer: MEDICAID

## 2019-01-01 ENCOUNTER — TELEPHONE (OUTPATIENT)
Dept: FAMILY MEDICINE CLINIC | Facility: CLINIC | Age: 49
End: 2019-01-01

## 2019-01-01 ENCOUNTER — APPOINTMENT (OUTPATIENT)
Dept: GENERAL RADIOLOGY | Facility: HOSPITAL | Age: 49
DRG: 917 | End: 2019-01-01
Attending: INTERNAL MEDICINE
Payer: MEDICAID

## 2019-01-01 ENCOUNTER — HOSPITAL ENCOUNTER (INPATIENT)
Facility: HOSPITAL | Age: 49
LOS: 3 days | Discharge: HOME | DRG: 897 | End: 2019-01-01
Attending: EMERGENCY MEDICINE | Admitting: HOSPITALIST
Payer: MEDICAID

## 2019-01-01 ENCOUNTER — APPOINTMENT (OUTPATIENT)
Dept: GENERAL RADIOLOGY | Facility: HOSPITAL | Age: 49
DRG: 439 | End: 2019-01-01
Attending: EMERGENCY MEDICINE
Payer: MEDICAID

## 2019-01-01 ENCOUNTER — HOSPITAL ENCOUNTER (INPATIENT)
Facility: HOSPITAL | Age: 49
LOS: 2 days | Discharge: HOME | DRG: 639 | End: 2019-01-01
Attending: HOSPITALIST | Admitting: HOSPITALIST
Payer: MEDICAID

## 2019-01-01 ENCOUNTER — TELEPHONE (OUTPATIENT)
Dept: SURGERY | Facility: CLINIC | Age: 49
End: 2019-01-01

## 2019-01-01 ENCOUNTER — APPOINTMENT (OUTPATIENT)
Dept: GENERAL RADIOLOGY | Facility: HOSPITAL | Age: 49
DRG: 639 | End: 2019-01-01
Attending: PHYSICIAN ASSISTANT
Payer: MEDICAID

## 2019-01-01 ENCOUNTER — PATIENT OUTREACH (OUTPATIENT)
Dept: FAMILY MEDICINE CLINIC | Facility: CLINIC | Age: 49
End: 2019-01-01

## 2019-01-01 ENCOUNTER — OFFICE VISIT (OUTPATIENT)
Dept: FAMILY MEDICINE CLINIC | Facility: CLINIC | Age: 49
End: 2019-01-01
Payer: MEDICAID

## 2019-01-01 ENCOUNTER — PATIENT OUTREACH (OUTPATIENT)
Dept: CASE MANAGEMENT | Age: 49
End: 2019-01-01

## 2019-01-01 ENCOUNTER — APPOINTMENT (OUTPATIENT)
Dept: CT IMAGING | Facility: HOSPITAL | Age: 49
DRG: 917 | End: 2019-01-01
Attending: EMERGENCY MEDICINE
Payer: MEDICAID

## 2019-01-01 ENCOUNTER — APPOINTMENT (OUTPATIENT)
Dept: CV DIAGNOSTICS | Facility: HOSPITAL | Age: 49
DRG: 917 | End: 2019-01-01
Attending: INTERNAL MEDICINE
Payer: MEDICAID

## 2019-01-01 ENCOUNTER — APPOINTMENT (OUTPATIENT)
Dept: CT IMAGING | Facility: HOSPITAL | Age: 49
DRG: 100 | End: 2019-01-01
Attending: HOSPITALIST
Payer: MEDICAID

## 2019-01-01 ENCOUNTER — PATIENT MESSAGE (OUTPATIENT)
Dept: CASE MANAGEMENT | Age: 49
End: 2019-01-01

## 2019-01-01 ENCOUNTER — APPOINTMENT (OUTPATIENT)
Dept: GENERAL RADIOLOGY | Facility: HOSPITAL | Age: 49
DRG: 917 | End: 2019-01-01
Attending: EMERGENCY MEDICINE
Payer: MEDICAID

## 2019-01-01 ENCOUNTER — HOSPITAL ENCOUNTER (INPATIENT)
Facility: HOSPITAL | Age: 49
LOS: 4 days | DRG: 917 | End: 2019-01-01
Attending: EMERGENCY MEDICINE | Admitting: HOSPITALIST
Payer: MEDICAID

## 2019-01-01 ENCOUNTER — HOSPITAL ENCOUNTER (EMERGENCY)
Facility: HOSPITAL | Age: 49
Discharge: HOME | End: 2019-01-01
Attending: EMERGENCY MEDICINE
Payer: MEDICAID

## 2019-01-01 ENCOUNTER — ANESTHESIA (OUTPATIENT)
Dept: ENDOSCOPY | Facility: HOSPITAL | Age: 49
End: 2019-01-01

## 2019-01-01 ENCOUNTER — APPOINTMENT (OUTPATIENT)
Dept: CT IMAGING | Facility: HOSPITAL | Age: 49
DRG: 439 | End: 2019-01-01
Attending: EMERGENCY MEDICINE
Payer: MEDICAID

## 2019-01-01 ENCOUNTER — APPOINTMENT (OUTPATIENT)
Dept: CT IMAGING | Facility: HOSPITAL | Age: 49
DRG: 897 | End: 2019-01-01
Attending: EMERGENCY MEDICINE
Payer: MEDICAID

## 2019-01-01 ENCOUNTER — HOSPITAL ENCOUNTER (INPATIENT)
Facility: HOSPITAL | Age: 49
LOS: 6 days | Discharge: HOME | DRG: 100 | End: 2019-01-01
Attending: STUDENT IN AN ORGANIZED HEALTH CARE EDUCATION/TRAINING PROGRAM | Admitting: HOSPITALIST
Payer: MEDICAID

## 2019-01-01 ENCOUNTER — OFFICE VISIT (OUTPATIENT)
Dept: SURGERY | Facility: CLINIC | Age: 49
End: 2019-01-01
Payer: MEDICAID

## 2019-01-01 ENCOUNTER — TELEPHONE (OUTPATIENT)
Dept: CASE MANAGEMENT | Age: 49
End: 2019-01-01

## 2019-01-01 ENCOUNTER — ANESTHESIA EVENT (OUTPATIENT)
Dept: ENDOSCOPY | Facility: HOSPITAL | Age: 49
End: 2019-01-01

## 2019-01-01 VITALS
OXYGEN SATURATION: 99 % | HEART RATE: 77 BPM | BODY MASS INDEX: 25.25 KG/M2 | SYSTOLIC BLOOD PRESSURE: 139 MMHG | TEMPERATURE: 98 F | HEIGHT: 66.5 IN | WEIGHT: 159 LBS | DIASTOLIC BLOOD PRESSURE: 80 MMHG | RESPIRATION RATE: 15 BRPM

## 2019-01-01 VITALS
RESPIRATION RATE: 14 BRPM | SYSTOLIC BLOOD PRESSURE: 132 MMHG | BODY MASS INDEX: 24.33 KG/M2 | WEIGHT: 155 LBS | OXYGEN SATURATION: 99 % | TEMPERATURE: 98 F | DIASTOLIC BLOOD PRESSURE: 92 MMHG | HEART RATE: 88 BPM | HEIGHT: 67 IN

## 2019-01-01 VITALS
TEMPERATURE: 98 F | HEART RATE: 66 BPM | RESPIRATION RATE: 16 BRPM | WEIGHT: 160 LBS | DIASTOLIC BLOOD PRESSURE: 80 MMHG | SYSTOLIC BLOOD PRESSURE: 128 MMHG | HEIGHT: 66 IN | BODY MASS INDEX: 25.71 KG/M2 | OXYGEN SATURATION: 99 %

## 2019-01-01 VITALS
HEIGHT: 66 IN | TEMPERATURE: 98 F | WEIGHT: 160 LBS | SYSTOLIC BLOOD PRESSURE: 155 MMHG | HEART RATE: 97 BPM | RESPIRATION RATE: 16 BRPM | DIASTOLIC BLOOD PRESSURE: 92 MMHG | OXYGEN SATURATION: 99 % | BODY MASS INDEX: 25.71 KG/M2

## 2019-01-01 VITALS
RESPIRATION RATE: 24 BRPM | TEMPERATURE: 101 F | BODY MASS INDEX: 30 KG/M2 | WEIGHT: 186.5 LBS | OXYGEN SATURATION: 94 % | HEART RATE: 93 BPM | DIASTOLIC BLOOD PRESSURE: 59 MMHG | SYSTOLIC BLOOD PRESSURE: 95 MMHG

## 2019-01-01 VITALS
RESPIRATION RATE: 11 BRPM | BODY MASS INDEX: 25.05 KG/M2 | TEMPERATURE: 99 F | HEART RATE: 83 BPM | HEIGHT: 66 IN | OXYGEN SATURATION: 98 % | SYSTOLIC BLOOD PRESSURE: 122 MMHG | DIASTOLIC BLOOD PRESSURE: 85 MMHG | WEIGHT: 155.88 LBS

## 2019-01-01 VITALS
SYSTOLIC BLOOD PRESSURE: 152 MMHG | HEIGHT: 66 IN | HEART RATE: 72 BPM | TEMPERATURE: 98 F | BODY MASS INDEX: 24.73 KG/M2 | RESPIRATION RATE: 15 BRPM | WEIGHT: 153.88 LBS | OXYGEN SATURATION: 100 % | DIASTOLIC BLOOD PRESSURE: 103 MMHG

## 2019-01-01 VITALS — BODY MASS INDEX: 26 KG/M2 | TEMPERATURE: 98 F | WEIGHT: 160 LBS

## 2019-01-01 DIAGNOSIS — K70.30 ALCOHOLIC CIRRHOSIS OF LIVER WITHOUT ASCITES (HCC): ICD-10-CM

## 2019-01-01 DIAGNOSIS — M21.70 LEG LENGTH INEQUALITY: ICD-10-CM

## 2019-01-01 DIAGNOSIS — F41.9 ANXIETY: ICD-10-CM

## 2019-01-01 DIAGNOSIS — Z90.81 S/P SPLENECTOMY: ICD-10-CM

## 2019-01-01 DIAGNOSIS — R56.9 SEIZURE (HCC): ICD-10-CM

## 2019-01-01 DIAGNOSIS — F10.230 ALCOHOL WITHDRAWAL SYNDROME WITHOUT COMPLICATION (HCC): ICD-10-CM

## 2019-01-01 DIAGNOSIS — F41.9 ANXIETY DISORDER, UNSPECIFIED TYPE: ICD-10-CM

## 2019-01-01 DIAGNOSIS — D64.9 ANEMIA, UNSPECIFIED TYPE: ICD-10-CM

## 2019-01-01 DIAGNOSIS — E55.9 VITAMIN D DEFICIENCY: ICD-10-CM

## 2019-01-01 DIAGNOSIS — K85.20 ALCOHOL-INDUCED ACUTE PANCREATITIS WITHOUT INFECTION OR NECROSIS: ICD-10-CM

## 2019-01-01 DIAGNOSIS — M54.6 LEFT-SIDED THORACIC BACK PAIN, UNSPECIFIED CHRONICITY: ICD-10-CM

## 2019-01-01 DIAGNOSIS — E11.65 UNCONTROLLED TYPE 2 DIABETES MELLITUS WITH HYPERGLYCEMIA (HCC): ICD-10-CM

## 2019-01-01 DIAGNOSIS — K86.0 ALCOHOL-INDUCED CHRONIC PANCREATITIS (HCC): ICD-10-CM

## 2019-01-01 DIAGNOSIS — G89.29 CHRONIC ABDOMINAL PAIN: ICD-10-CM

## 2019-01-01 DIAGNOSIS — R11.0 NAUSEA: ICD-10-CM

## 2019-01-01 DIAGNOSIS — E11.65 UNCONTROLLED TYPE 2 DIABETES MELLITUS WITH HYPERGLYCEMIA (HCC): Primary | ICD-10-CM

## 2019-01-01 DIAGNOSIS — F10.20 ALCOHOLISM (HCC): ICD-10-CM

## 2019-01-01 DIAGNOSIS — G89.29 CHRONIC BACK PAIN, UNSPECIFIED BACK LOCATION, UNSPECIFIED BACK PAIN LATERALITY: Primary | ICD-10-CM

## 2019-01-01 DIAGNOSIS — M54.9 MID BACK PAIN: ICD-10-CM

## 2019-01-01 DIAGNOSIS — F33.2 SEVERE EPISODE OF RECURRENT MAJOR DEPRESSIVE DISORDER, WITHOUT PSYCHOTIC FEATURES (HCC): ICD-10-CM

## 2019-01-01 DIAGNOSIS — I10 ESSENTIAL HYPERTENSION: ICD-10-CM

## 2019-01-01 DIAGNOSIS — E87.6 HYPOKALEMIA: ICD-10-CM

## 2019-01-01 DIAGNOSIS — R35.0 URINARY FREQUENCY: ICD-10-CM

## 2019-01-01 DIAGNOSIS — R10.84 GENERALIZED ABDOMINAL PAIN: ICD-10-CM

## 2019-01-01 DIAGNOSIS — F10.20 SEVERE ALCOHOL USE DISORDER (HCC): ICD-10-CM

## 2019-01-01 DIAGNOSIS — D69.6 THROMBOCYTOPENIA (HCC): ICD-10-CM

## 2019-01-01 DIAGNOSIS — E86.0 DEHYDRATION: ICD-10-CM

## 2019-01-01 DIAGNOSIS — E11.65 TYPE 2 DIABETES MELLITUS WITH HYPERGLYCEMIA, WITH LONG-TERM CURRENT USE OF INSULIN (HCC): ICD-10-CM

## 2019-01-01 DIAGNOSIS — E87.1 HYPONATREMIA: ICD-10-CM

## 2019-01-01 DIAGNOSIS — E11.10 DIABETIC KETOACIDOSIS WITHOUT COMA ASSOCIATED WITH TYPE 2 DIABETES MELLITUS (HCC): Primary | ICD-10-CM

## 2019-01-01 DIAGNOSIS — G40.901 STATUS EPILEPTICUS (HCC): Primary | ICD-10-CM

## 2019-01-01 DIAGNOSIS — F10.929 ALCOHOLIC INTOXICATION WITH COMPLICATION (HCC): ICD-10-CM

## 2019-01-01 DIAGNOSIS — Z90.81 S/P SPLENECTOMY: Primary | ICD-10-CM

## 2019-01-01 DIAGNOSIS — E78.5 DYSLIPIDEMIA: ICD-10-CM

## 2019-01-01 DIAGNOSIS — E11.9 DIABETES MELLITUS TYPE 2 IN NONOBESE (HCC): ICD-10-CM

## 2019-01-01 DIAGNOSIS — R10.9 CHRONIC ABDOMINAL PAIN: ICD-10-CM

## 2019-01-01 DIAGNOSIS — K92.1 MELENA: ICD-10-CM

## 2019-01-01 DIAGNOSIS — Z91.14 NONCOMPLIANCE W/MEDICATION TREATMENT DUE TO INTERMIT USE OF MEDICATION: ICD-10-CM

## 2019-01-01 DIAGNOSIS — M54.9 CHRONIC BACK PAIN, UNSPECIFIED BACK LOCATION, UNSPECIFIED BACK PAIN LATERALITY: Primary | ICD-10-CM

## 2019-01-01 DIAGNOSIS — G40.909 SEIZURE DISORDER (HCC): Primary | ICD-10-CM

## 2019-01-01 DIAGNOSIS — R10.12 INTERMITTENT LEFT UPPER QUADRANT ABDOMINAL PAIN: ICD-10-CM

## 2019-01-01 DIAGNOSIS — Z79.4 TYPE 2 DIABETES MELLITUS WITH HYPERGLYCEMIA, WITH LONG-TERM CURRENT USE OF INSULIN (HCC): ICD-10-CM

## 2019-01-01 DIAGNOSIS — M54.9 BACK PAIN, UNSPECIFIED BACK LOCATION, UNSPECIFIED BACK PAIN LATERALITY, UNSPECIFIED CHRONICITY: ICD-10-CM

## 2019-01-01 DIAGNOSIS — F10.239 ALCOHOL DEPENDENCE WITH WITHDRAWAL WITH COMPLICATION (HCC): ICD-10-CM

## 2019-01-01 DIAGNOSIS — E11.65 TYPE 2 DIABETES MELLITUS WITH HYPERGLYCEMIA, WITH LONG-TERM CURRENT USE OF INSULIN (HCC): Primary | ICD-10-CM

## 2019-01-01 DIAGNOSIS — Z09 HOSPITAL DISCHARGE FOLLOW-UP: Primary | ICD-10-CM

## 2019-01-01 DIAGNOSIS — Z87.19 HISTORY OF GI BLEED: ICD-10-CM

## 2019-01-01 DIAGNOSIS — R10.12 LUQ PAIN: ICD-10-CM

## 2019-01-01 DIAGNOSIS — E16.2 HYPOGLYCEMIA: ICD-10-CM

## 2019-01-01 DIAGNOSIS — K86.89 PANCREATIC PAIN: ICD-10-CM

## 2019-01-01 DIAGNOSIS — R56.9 SEIZURE (HCC): Primary | ICD-10-CM

## 2019-01-01 DIAGNOSIS — Z79.4 TYPE 2 DIABETES MELLITUS WITH HYPERGLYCEMIA, WITH LONG-TERM CURRENT USE OF INSULIN (HCC): Primary | ICD-10-CM

## 2019-01-01 LAB
ALBUMIN SERPL-MCNC: 1.5 G/DL (ref 3.4–5)
ALBUMIN SERPL-MCNC: 2 G/DL (ref 3.4–5)
ALBUMIN SERPL-MCNC: 2.1 G/DL (ref 3.4–5)
ALBUMIN SERPL-MCNC: 2.2 G/DL (ref 3.1–4.5)
ALBUMIN SERPL-MCNC: 2.3 G/DL (ref 3.1–4.5)
ALBUMIN SERPL-MCNC: 2.4 G/DL (ref 3.4–5)
ALBUMIN SERPL-MCNC: 2.9 G/DL (ref 3.4–5)
ALBUMIN SERPL-MCNC: 3 G/DL (ref 3.4–5)
ALBUMIN SERPL-MCNC: 3.4 G/DL (ref 3.4–5)
ALBUMIN SERPL-MCNC: 3.5 G/DL (ref 3.1–4.5)
ALBUMIN SERPL-MCNC: 3.6 G/DL (ref 3.4–5)
ALBUMIN SERPL-MCNC: 3.8 G/DL (ref 3.4–5)
ALBUMIN/GLOB SERPL: 0.4 {RATIO} (ref 1–2)
ALBUMIN/GLOB SERPL: 0.5 {RATIO} (ref 1–2)
ALBUMIN/GLOB SERPL: 0.6 {RATIO} (ref 1–2)
ALBUMIN/GLOB SERPL: 0.6 {RATIO} (ref 1–2)
ALBUMIN/GLOB SERPL: 0.7 {RATIO} (ref 1–2)
ALBUMIN/GLOB SERPL: 0.8 {RATIO} (ref 1–2)
ALBUMIN/GLOB SERPL: 0.8 {RATIO} (ref 1–2)
ALLENS TEST: POSITIVE
ALLENS TEST: POSITIVE
ALP LIVER SERPL-CCNC: 117 U/L (ref 45–117)
ALP LIVER SERPL-CCNC: 124 U/L (ref 45–117)
ALP LIVER SERPL-CCNC: 124 U/L (ref 45–117)
ALP LIVER SERPL-CCNC: 139 U/L (ref 45–117)
ALP LIVER SERPL-CCNC: 143 U/L (ref 45–117)
ALP LIVER SERPL-CCNC: 153 U/L (ref 45–117)
ALP LIVER SERPL-CCNC: 189 U/L (ref 45–117)
ALP LIVER SERPL-CCNC: 212 U/L (ref 45–117)
ALP LIVER SERPL-CCNC: 248 U/L (ref 45–117)
ALP LIVER SERPL-CCNC: 316 U/L (ref 45–117)
ALP LIVER SERPL-CCNC: 98 U/L (ref 45–117)
ALP LIVER SERPL-CCNC: 99 U/L (ref 45–117)
ALT SERPL-CCNC: 10 U/L (ref 17–63)
ALT SERPL-CCNC: 12 U/L (ref 17–63)
ALT SERPL-CCNC: 16 U/L (ref 16–61)
ALT SERPL-CCNC: 239 U/L (ref 16–61)
ALT SERPL-CCNC: 26 U/L (ref 16–61)
ALT SERPL-CCNC: 32 U/L (ref 16–61)
ALT SERPL-CCNC: 32 U/L (ref 17–63)
ALT SERPL-CCNC: 35 U/L (ref 16–61)
ALT SERPL-CCNC: 46 U/L (ref 16–61)
ALT SERPL-CCNC: 56 U/L (ref 16–61)
ALT SERPL-CCNC: 72 U/L (ref 16–61)
ALT SERPL-CCNC: 97 U/L (ref 16–61)
AMPHET UR QL SCN: NEGATIVE
ANION GAP SERPL CALC-SCNC: 10 MMOL/L (ref 0–18)
ANION GAP SERPL CALC-SCNC: 11 MMOL/L (ref 0–18)
ANION GAP SERPL CALC-SCNC: 11 MMOL/L (ref 0–18)
ANION GAP SERPL CALC-SCNC: 12 MMOL/L (ref 0–18)
ANION GAP SERPL CALC-SCNC: 13 MMOL/L (ref 0–18)
ANION GAP SERPL CALC-SCNC: 14 MMOL/L (ref 0–18)
ANION GAP SERPL CALC-SCNC: 14 MMOL/L (ref 0–18)
ANION GAP SERPL CALC-SCNC: 16 MMOL/L (ref 0–18)
ANION GAP SERPL CALC-SCNC: 20 MMOL/L (ref 0–18)
ANION GAP SERPL CALC-SCNC: 6 MMOL/L (ref 0–18)
ANION GAP SERPL CALC-SCNC: 7 MMOL/L (ref 0–18)
ANION GAP SERPL CALC-SCNC: 7 MMOL/L (ref 0–18)
ANION GAP SERPL CALC-SCNC: 8 MMOL/L (ref 0–18)
ANION GAP SERPL CALC-SCNC: 9 MMOL/L (ref 0–18)
ANTIBODY SCREEN: NEGATIVE
APAP SERPL-MCNC: <2 UG/ML (ref 10–30)
APTT PPP: 22.9 SECONDS (ref 25.4–36.1)
APTT PPP: 23.5 SECONDS (ref 26.1–34.6)
APTT PPP: 28.8 SECONDS (ref 25.4–36.1)
ARTERIAL BLD GAS O2 SATURATION: 88 % (ref 92–100)
ARTERIAL BLD GAS O2 SATURATION: 88 % (ref 92–100)
ARTERIAL BLD GAS O2 SATURATION: 94 % (ref 92–100)
ARTERIAL BLD GAS O2 SATURATION: 95 % (ref 92–100)
ARTERIAL BLD GAS O2 SATURATION: 96 % (ref 92–100)
ARTERIAL BLD GAS O2 SATURATION: 97 % (ref 92–100)
ARTERIAL BLD GAS O2 SATURATION: 97 % (ref 92–100)
ARTERIAL BLD GAS O2 SATURATION: 98 % (ref 92–100)
ARTERIAL BLD GAS O2 SATURATION: 98 % (ref 92–100)
ARTERIAL BLOOD GAS BASE EXCESS: -11.6 MMOL/L (ref ?–2)
ARTERIAL BLOOD GAS BASE EXCESS: -11.6 MMOL/L (ref ?–2)
ARTERIAL BLOOD GAS BASE EXCESS: -12 MMOL/L (ref ?–2)
ARTERIAL BLOOD GAS BASE EXCESS: -12.6 MMOL/L (ref ?–2)
ARTERIAL BLOOD GAS BASE EXCESS: -3 MMOL/L (ref ?–2)
ARTERIAL BLOOD GAS BASE EXCESS: -9.4 MMOL/L (ref ?–2)
ARTERIAL BLOOD GAS BASE EXCESS: 0.1 MMOL/L (ref ?–2)
ARTERIAL BLOOD GAS BASE EXCESS: 0.2 MMOL/L (ref ?–2)
ARTERIAL BLOOD GAS BASE EXCESS: 1.7 MMOL/L (ref ?–2)
ARTERIAL BLOOD GAS HCO3: 13.4 MEQ/L (ref 22–26)
ARTERIAL BLOOD GAS HCO3: 13.4 MEQ/L (ref 22–26)
ARTERIAL BLOOD GAS HCO3: 13.5 MEQ/L (ref 22–26)
ARTERIAL BLOOD GAS HCO3: 13.7 MEQ/L (ref 22–26)
ARTERIAL BLOOD GAS HCO3: 15.6 MEQ/L (ref 22–26)
ARTERIAL BLOOD GAS HCO3: 21.1 MEQ/L (ref 22–26)
ARTERIAL BLOOD GAS HCO3: 25.4 MEQ/L (ref 22–26)
ARTERIAL BLOOD GAS HCO3: 28.6 MEQ/L (ref 22–26)
ARTERIAL BLOOD GAS HCO3: 29 MEQ/L (ref 22–26)
ARTERIAL BLOOD GAS PCO2: 29 MM HG (ref 35–45)
ARTERIAL BLOOD GAS PCO2: 30 MM HG (ref 35–45)
ARTERIAL BLOOD GAS PCO2: 33 MM HG (ref 35–45)
ARTERIAL BLOOD GAS PCO2: 34 MM HG (ref 35–45)
ARTERIAL BLOOD GAS PCO2: 47 MM HG (ref 35–45)
ARTERIAL BLOOD GAS PCO2: 57 MM HG (ref 35–45)
ARTERIAL BLOOD GAS PCO2: 64 MM HG (ref 35–45)
ARTERIAL BLOOD GAS PH: 7.24 (ref 7.35–7.45)
ARTERIAL BLOOD GAS PH: 7.27 (ref 7.35–7.45)
ARTERIAL BLOOD GAS PH: 7.28 (ref 7.35–7.45)
ARTERIAL BLOOD GAS PH: 7.29 (ref 7.35–7.45)
ARTERIAL BLOOD GAS PH: 7.29 (ref 7.35–7.45)
ARTERIAL BLOOD GAS PH: 7.32 (ref 7.35–7.45)
ARTERIAL BLOOD GAS PH: 7.33 (ref 7.35–7.45)
ARTERIAL BLOOD GAS PH: 7.36 (ref 7.35–7.45)
ARTERIAL BLOOD GAS PH: 7.4 (ref 7.35–7.45)
ARTERIAL BLOOD GAS PO2: 124 MM HG (ref 80–105)
ARTERIAL BLOOD GAS PO2: 140 MM HG (ref 80–105)
ARTERIAL BLOOD GAS PO2: 142 MM HG (ref 80–105)
ARTERIAL BLOOD GAS PO2: 175 MM HG (ref 80–105)
ARTERIAL BLOOD GAS PO2: 481 MM HG (ref 80–105)
ARTERIAL BLOOD GAS PO2: 69 MM HG (ref 80–105)
ARTERIAL BLOOD GAS PO2: 69 MM HG (ref 80–105)
ARTERIAL BLOOD GAS PO2: 81 MM HG (ref 80–105)
ARTERIAL BLOOD GAS PO2: 82 MM HG (ref 80–105)
AST SERPL-CCNC: 15 U/L (ref 15–41)
AST SERPL-CCNC: 150 U/L (ref 15–37)
AST SERPL-CCNC: 16 U/L (ref 15–41)
AST SERPL-CCNC: 200 U/L (ref 15–37)
AST SERPL-CCNC: 21 U/L (ref 15–37)
AST SERPL-CCNC: 246 U/L (ref 15–37)
AST SERPL-CCNC: 250 U/L (ref 15–37)
AST SERPL-CCNC: 63 U/L (ref 15–37)
AST SERPL-CCNC: 63 U/L (ref 15–41)
AST SERPL-CCNC: 68 U/L (ref 15–37)
AST SERPL-CCNC: 95 U/L (ref 15–37)
AST SERPL-CCNC: 96 U/L (ref 15–37)
ATRIAL RATE: 104 BPM
ATRIAL RATE: 147 BPM
ATRIAL RATE: 64 BPM
ATRIAL RATE: 97 BPM
BARBITURATES UR QL SCN: NEGATIVE
BASOPHIL % MANUAL: 0 %
BASOPHIL ABSOLUTE MANUAL: 0 X10(3) UL (ref 0–0.1)
BASOPHILS # BLD AUTO: 0.01 X10(3) UL (ref 0–0.1)
BASOPHILS # BLD AUTO: 0.01 X10(3) UL (ref 0–0.1)
BASOPHILS # BLD AUTO: 0.01 X10(3) UL (ref 0–0.2)
BASOPHILS # BLD AUTO: 0.02 X10(3) UL (ref 0–0.1)
BASOPHILS # BLD AUTO: 0.02 X10(3) UL (ref 0–0.2)
BASOPHILS # BLD AUTO: 0.03 X10(3) UL (ref 0–0.2)
BASOPHILS # BLD AUTO: 0.04 X10(3) UL (ref 0–0.2)
BASOPHILS # BLD AUTO: 0.04 X10(3) UL (ref 0–0.2)
BASOPHILS # BLD AUTO: 0.05 X10(3) UL (ref 0–0.2)
BASOPHILS # BLD AUTO: 0.06 X10(3) UL (ref 0–0.2)
BASOPHILS # BLD: 0 X10(3) UL (ref 0–0.2)
BASOPHILS NFR BLD AUTO: 0.1 %
BASOPHILS NFR BLD AUTO: 0.2 %
BASOPHILS NFR BLD AUTO: 0.3 %
BASOPHILS NFR BLD AUTO: 0.4 %
BASOPHILS NFR BLD AUTO: 0.4 %
BASOPHILS NFR BLD AUTO: 0.5 %
BASOPHILS NFR BLD: 0 %
BENZODIAZ UR QL SCN: NEGATIVE
BETA-HYDROXYBUTYRIC ACID: 9.2 MG/DL
BILIRUB SERPL-MCNC: 0.2 MG/DL (ref 0.1–2)
BILIRUB SERPL-MCNC: 0.2 MG/DL (ref 0.1–2)
BILIRUB SERPL-MCNC: 0.3 MG/DL (ref 0.1–2)
BILIRUB SERPL-MCNC: 0.3 MG/DL (ref 0.1–2)
BILIRUB SERPL-MCNC: 0.4 MG/DL (ref 0.1–2)
BILIRUB SERPL-MCNC: 0.5 MG/DL (ref 0.1–2)
BILIRUB SERPL-MCNC: 0.5 MG/DL (ref 0.1–2)
BILIRUB SERPL-MCNC: 0.6 MG/DL (ref 0.1–2)
BILIRUB SERPL-MCNC: 0.6 MG/DL (ref 0.1–2)
BILIRUB SERPL-MCNC: 0.8 MG/DL (ref 0.1–2)
BILIRUB SERPL-MCNC: 0.8 MG/DL (ref 0.1–2)
BILIRUB SERPL-MCNC: <0.1 MG/DL (ref 0.1–2)
BILIRUB UR QL STRIP.AUTO: NEGATIVE
BLOOD TYPE BARCODE: 7300
BUN BLD-MCNC: 11 MG/DL (ref 7–18)
BUN BLD-MCNC: 13 MG/DL (ref 8–20)
BUN BLD-MCNC: 17 MG/DL (ref 7–18)
BUN BLD-MCNC: 3 MG/DL (ref 8–20)
BUN BLD-MCNC: 5 MG/DL (ref 7–18)
BUN BLD-MCNC: 6 MG/DL (ref 7–18)
BUN BLD-MCNC: 6 MG/DL (ref 8–20)
BUN BLD-MCNC: 8 MG/DL (ref 7–18)
BUN BLD-MCNC: 8 MG/DL (ref 8–20)
BUN/CREAT SERPL: 10 (ref 10–20)
BUN/CREAT SERPL: 10.5 (ref 10–20)
BUN/CREAT SERPL: 10.5 (ref 10–20)
BUN/CREAT SERPL: 11.3 (ref 10–20)
BUN/CREAT SERPL: 13.8 (ref 10–20)
BUN/CREAT SERPL: 16.2 (ref 10–20)
BUN/CREAT SERPL: 16.7 (ref 10–20)
BUN/CREAT SERPL: 3.7 (ref 10–20)
BUN/CREAT SERPL: 4.5 (ref 10–20)
BUN/CREAT SERPL: 5.6 (ref 10–20)
BUN/CREAT SERPL: 6.3 (ref 10–20)
BUN/CREAT SERPL: 7 (ref 10–20)
BUN/CREAT SERPL: 7.9 (ref 10–20)
BUN/CREAT SERPL: 8.1 (ref 10–20)
CALCIUM BLD-MCNC: 6.4 MG/DL (ref 8.5–10.1)
CALCIUM BLD-MCNC: 6.6 MG/DL (ref 8.5–10.1)
CALCIUM BLD-MCNC: 7.3 MG/DL (ref 8.5–10.1)
CALCIUM BLD-MCNC: 7.9 MG/DL (ref 8.3–10.3)
CALCIUM BLD-MCNC: 8 MG/DL (ref 8.3–10.3)
CALCIUM BLD-MCNC: 8 MG/DL (ref 8.5–10.1)
CALCIUM BLD-MCNC: 8.2 MG/DL (ref 8.3–10.3)
CALCIUM BLD-MCNC: 8.4 MG/DL (ref 8.5–10.1)
CALCIUM BLD-MCNC: 8.5 MG/DL (ref 8.3–10.3)
CALCIUM BLD-MCNC: 8.5 MG/DL (ref 8.5–10.1)
CALCIUM BLD-MCNC: 8.9 MG/DL (ref 8.5–10.1)
CALCIUM BLD-MCNC: 9 MG/DL (ref 8.5–10.1)
CALCIUM BLD-MCNC: 9.1 MG/DL (ref 8.5–10.1)
CALCIUM BLD-MCNC: 9.6 MG/DL (ref 8.5–10.1)
CALCIUM BLD-MCNC: 9.9 MG/DL (ref 8.5–10.1)
CALCIUM BLD-MCNC: <5 MG/DL (ref 8.5–10.1)
CALCULATED O2 SATURATION: 100 % (ref 92–100)
CALCULATED O2 SATURATION: 100 % (ref 92–100)
CALCULATED O2 SATURATION: 89 % (ref 92–100)
CALCULATED O2 SATURATION: 89 % (ref 92–100)
CALCULATED O2 SATURATION: 93 % (ref 92–100)
CALCULATED O2 SATURATION: 94 % (ref 92–100)
CALCULATED O2 SATURATION: 98 % (ref 92–100)
CALCULATED O2 SATURATION: 99 % (ref 92–100)
CALCULATED O2 SATURATION: 99 % (ref 92–100)
CANNABINOIDS UR QL SCN: NEGATIVE
CARBOXYHEMOGLOBIN: 1.2 % SAT (ref 0–3)
CARBOXYHEMOGLOBIN: 1.3 % SAT (ref 0–3)
CARBOXYHEMOGLOBIN: 1.4 % SAT (ref 0–3)
CHLORIDE SERPL-SCNC: 101 MMOL/L (ref 101–111)
CHLORIDE SERPL-SCNC: 102 MMOL/L (ref 98–112)
CHLORIDE SERPL-SCNC: 103 MMOL/L (ref 101–111)
CHLORIDE SERPL-SCNC: 103 MMOL/L (ref 98–112)
CHLORIDE SERPL-SCNC: 104 MMOL/L (ref 98–112)
CHLORIDE SERPL-SCNC: 107 MMOL/L (ref 98–107)
CHLORIDE SERPL-SCNC: 107 MMOL/L (ref 98–112)
CHLORIDE SERPL-SCNC: 109 MMOL/L (ref 98–107)
CHLORIDE SERPL-SCNC: 111 MMOL/L (ref 98–112)
CHLORIDE SERPL-SCNC: 117 MMOL/L (ref 98–112)
CHLORIDE SERPL-SCNC: 118 MMOL/L (ref 98–112)
CHLORIDE SERPL-SCNC: 120 MMOL/L (ref 98–112)
CHLORIDE SERPL-SCNC: 90 MMOL/L (ref 98–112)
CHLORIDE SERPL-SCNC: 92 MMOL/L (ref 98–107)
CHLORIDE SERPL-SCNC: 98 MMOL/L (ref 101–111)
CHLORIDE SERPL-SCNC: 99 MMOL/L (ref 101–111)
CHOLEST SMN-MCNC: 119 MG/DL (ref ?–200)
CK SERPL-CCNC: ABNORMAL U/L (ref 39–308)
CLARITY UR REFRACT.AUTO: CLEAR
CO2 SERPL-SCNC: 14 MMOL/L (ref 21–32)
CO2 SERPL-SCNC: 16 MMOL/L (ref 21–32)
CO2 SERPL-SCNC: 18 MMOL/L (ref 21–32)
CO2 SERPL-SCNC: 19 MMOL/L (ref 21–32)
CO2 SERPL-SCNC: 22 MMOL/L (ref 21–32)
CO2 SERPL-SCNC: 23 MMOL/L (ref 21–32)
CO2 SERPL-SCNC: 24 MMOL/L (ref 21–32)
CO2 SERPL-SCNC: 24 MMOL/L (ref 21–32)
CO2 SERPL-SCNC: 24 MMOL/L (ref 22–32)
CO2 SERPL-SCNC: 24 MMOL/L (ref 22–32)
CO2 SERPL-SCNC: 25 MMOL/L (ref 21–32)
CO2 SERPL-SCNC: 25 MMOL/L (ref 22–32)
CO2 SERPL-SCNC: 26 MMOL/L (ref 21–32)
CO2 SERPL-SCNC: 27 MMOL/L (ref 22–32)
CO2 SERPL-SCNC: 28 MMOL/L (ref 21–32)
CO2 SERPL-SCNC: 30 MMOL/L (ref 21–32)
COCAINE UR QL: NEGATIVE
COLOR UR AUTO: YELLOW
CORTIS SERPL-MCNC: 55.4 UG/DL
CREAT BLD-MCNC: 0.48 MG/DL (ref 0.7–1.3)
CREAT BLD-MCNC: 0.58 MG/DL (ref 0.7–1.3)
CREAT BLD-MCNC: 0.6 MG/DL (ref 0.7–1.3)
CREAT BLD-MCNC: 0.74 MG/DL (ref 0.7–1.3)
CREAT BLD-MCNC: 0.76 MG/DL (ref 0.7–1.3)
CREAT BLD-MCNC: 0.76 MG/DL (ref 0.7–1.3)
CREAT BLD-MCNC: 0.82 MG/DL (ref 0.7–1.3)
CREAT BLD-MCNC: 0.86 MG/DL (ref 0.7–1.3)
CREAT BLD-MCNC: 0.96 MG/DL (ref 0.7–1.3)
CREAT BLD-MCNC: 1.05 MG/DL (ref 0.7–1.3)
CREAT BLD-MCNC: 1.07 MG/DL (ref 0.7–1.3)
CREAT BLD-MCNC: 1.1 MG/DL (ref 0.7–1.3)
CREAT BLD-MCNC: 1.14 MG/DL (ref 0.7–1.3)
CREAT BLD-MCNC: 1.14 MG/DL (ref 0.7–1.3)
CREAT BLD-MCNC: 1.15 MG/DL (ref 0.7–1.3)
CREAT BLD-MCNC: 1.39 MG/DL (ref 0.7–1.3)
CREAT UR-SCNC: 104 MG/DL
CREAT UR-SCNC: 279 MG/DL
CREAT UR-SCNC: 33.7 MG/DL
CREAT UR-SCNC: 38.8 MG/DL
DEPRECATED HBV CORE AB SER IA-ACNC: 17.3 NG/ML (ref 30–490)
DEPRECATED RDW RBC AUTO: 51 FL (ref 35.1–46.3)
DEPRECATED RDW RBC AUTO: 52.9 FL (ref 35.1–46.3)
DEPRECATED RDW RBC AUTO: 55.6 FL (ref 35.1–46.3)
DEPRECATED RDW RBC AUTO: 56.1 FL (ref 35.1–46.3)
DEPRECATED RDW RBC AUTO: 57.8 FL (ref 35.1–46.3)
DEPRECATED RDW RBC AUTO: 61.2 FL (ref 35.1–46.3)
DEPRECATED RDW RBC AUTO: 64.5 FL (ref 35.1–46.3)
DEPRECATED RDW RBC AUTO: 68.8 FL (ref 35.1–46.3)
DEPRECATED RDW RBC AUTO: 70 FL (ref 35.1–46.3)
DEPRECATED RDW RBC AUTO: 70.4 FL (ref 35.1–46.3)
DEPRECATED RDW RBC AUTO: 73.9 FL (ref 35.1–46.3)
DEPRECATED RDW RBC AUTO: 76.4 FL (ref 35.1–46.3)
EOSINOPHIL # BLD AUTO: 0 X10(3) UL (ref 0–0.7)
EOSINOPHIL # BLD AUTO: 0.01 X10(3) UL (ref 0–0.7)
EOSINOPHIL # BLD AUTO: 0.04 X10(3) UL (ref 0–0.7)
EOSINOPHIL # BLD AUTO: 0.05 X10(3) UL (ref 0–0.3)
EOSINOPHIL # BLD AUTO: 0.05 X10(3) UL (ref 0–0.7)
EOSINOPHIL # BLD AUTO: 0.06 X10(3) UL (ref 0–0.3)
EOSINOPHIL # BLD AUTO: 0.08 X10(3) UL (ref 0–0.3)
EOSINOPHIL # BLD AUTO: 0.08 X10(3) UL (ref 0–0.7)
EOSINOPHIL # BLD AUTO: 0.09 X10(3) UL (ref 0–0.7)
EOSINOPHIL # BLD: 0 X10(3) UL (ref 0–0.7)
EOSINOPHIL % MANUAL: 0 %
EOSINOPHIL ABSOLUTE MANUAL: 0 X10(3) UL (ref 0–0.3)
EOSINOPHIL NFR BLD AUTO: 0 %
EOSINOPHIL NFR BLD AUTO: 0.1 %
EOSINOPHIL NFR BLD AUTO: 0.3 %
EOSINOPHIL NFR BLD AUTO: 0.4 %
EOSINOPHIL NFR BLD AUTO: 0.6 %
EOSINOPHIL NFR BLD AUTO: 0.8 %
EOSINOPHIL NFR BLD AUTO: 0.9 %
EOSINOPHIL NFR BLD: 0 %
ERYTHROCYTE [DISTWIDTH] IN BLOOD BY AUTOMATED COUNT: 18.2 % (ref 11–15)
ERYTHROCYTE [DISTWIDTH] IN BLOOD BY AUTOMATED COUNT: 18.6 % (ref 11–15)
ERYTHROCYTE [DISTWIDTH] IN BLOOD BY AUTOMATED COUNT: 18.9 % (ref 11–15)
ERYTHROCYTE [DISTWIDTH] IN BLOOD BY AUTOMATED COUNT: 19 % (ref 11.5–16)
ERYTHROCYTE [DISTWIDTH] IN BLOOD BY AUTOMATED COUNT: 19 % (ref 11–15)
ERYTHROCYTE [DISTWIDTH] IN BLOOD BY AUTOMATED COUNT: 19.1 % (ref 11.5–16)
ERYTHROCYTE [DISTWIDTH] IN BLOOD BY AUTOMATED COUNT: 19.4 % (ref 11.5–16)
ERYTHROCYTE [DISTWIDTH] IN BLOOD BY AUTOMATED COUNT: 19.4 % (ref 11.5–16)
ERYTHROCYTE [DISTWIDTH] IN BLOOD BY AUTOMATED COUNT: 19.8 % (ref 11–15)
ERYTHROCYTE [DISTWIDTH] IN BLOOD BY AUTOMATED COUNT: 20.1 % (ref 11–15)
ERYTHROCYTE [DISTWIDTH] IN BLOOD BY AUTOMATED COUNT: 21.3 % (ref 11–15)
ERYTHROCYTE [DISTWIDTH] IN BLOOD BY AUTOMATED COUNT: 22 % (ref 11–15)
ERYTHROCYTE [DISTWIDTH] IN BLOOD BY AUTOMATED COUNT: 22.4 % (ref 11–15)
ERYTHROCYTE [DISTWIDTH] IN BLOOD BY AUTOMATED COUNT: 23 % (ref 11–15)
ERYTHROCYTE [DISTWIDTH] IN BLOOD BY AUTOMATED COUNT: 23.2 % (ref 11–15)
ERYTHROCYTE [DISTWIDTH] IN BLOOD BY AUTOMATED COUNT: 23.3 % (ref 11–15)
EST. AVERAGE GLUCOSE BLD GHB EST-MCNC: 189 MG/DL (ref 68–126)
EST. AVERAGE GLUCOSE BLD GHB EST-MCNC: 220 MG/DL (ref 68–126)
EST. AVERAGE GLUCOSE BLD GHB EST-MCNC: 249 MG/DL (ref 68–126)
EST. AVERAGE GLUCOSE BLD GHB EST-MCNC: 278 MG/DL (ref 68–126)
ETHANOL SERPL-MCNC: 106 MG/DL (ref ?–3)
ETHANOL SERPL-MCNC: 154 MG/DL (ref ?–3)
ETHANOL SERPL-MCNC: 226 MG/DL (ref ?–3)
ETHANOL SERPL-MCNC: <3 MG/DL (ref ?–3)
ETHANOL UR-MCNC: NEGATIVE MG/DL
ETHANOL UR-MCNC: NEGATIVE MG/DL
ETHANOL UR-MCNC: POSITIVE MG/DL
ETHYL ALCOHOL: 464 MG/DL (ref ?–3)
FIO2: 100 %
FIO2: 100 %
FIO2: 30 %
FIO2: 40 %
FIO2: 50 %
FIO2: 50 %
GLOBULIN PLAS-MCNC: 3.4 G/DL (ref 2.8–4.4)
GLOBULIN PLAS-MCNC: 3.4 G/DL (ref 2.8–4.4)
GLOBULIN PLAS-MCNC: 3.8 G/DL (ref 2.8–4.4)
GLOBULIN PLAS-MCNC: 3.9 G/DL (ref 2.8–4.4)
GLOBULIN PLAS-MCNC: 4 G/DL (ref 2.8–4.4)
GLOBULIN PLAS-MCNC: 4.2 G/DL (ref 2.8–4.4)
GLOBULIN PLAS-MCNC: 4.2 G/DL (ref 2.8–4.4)
GLOBULIN PLAS-MCNC: 4.5 G/DL (ref 2.8–4.4)
GLOBULIN PLAS-MCNC: 4.6 G/DL (ref 2.8–4.4)
GLOBULIN PLAS-MCNC: 4.8 G/DL (ref 2.8–4.4)
GLOBULIN PLAS-MCNC: 5.5 G/DL (ref 2.8–4.4)
GLOBULIN PLAS-MCNC: 6.2 G/DL (ref 2.8–4.4)
GLUCOSE BLD-MCNC: 100 MG/DL (ref 65–99)
GLUCOSE BLD-MCNC: 100 MG/DL (ref 70–99)
GLUCOSE BLD-MCNC: 101 MG/DL (ref 70–99)
GLUCOSE BLD-MCNC: 103 MG/DL (ref 70–99)
GLUCOSE BLD-MCNC: 105 MG/DL (ref 70–99)
GLUCOSE BLD-MCNC: 105 MG/DL (ref 70–99)
GLUCOSE BLD-MCNC: 106 MG/DL (ref 65–99)
GLUCOSE BLD-MCNC: 106 MG/DL (ref 70–99)
GLUCOSE BLD-MCNC: 107 MG/DL (ref 70–99)
GLUCOSE BLD-MCNC: 113 MG/DL (ref 65–99)
GLUCOSE BLD-MCNC: 114 MG/DL (ref 70–99)
GLUCOSE BLD-MCNC: 114 MG/DL (ref 70–99)
GLUCOSE BLD-MCNC: 115 MG/DL (ref 70–99)
GLUCOSE BLD-MCNC: 120 MG/DL (ref 70–99)
GLUCOSE BLD-MCNC: 121 MG/DL (ref 70–99)
GLUCOSE BLD-MCNC: 122 MG/DL (ref 70–99)
GLUCOSE BLD-MCNC: 122 MG/DL (ref 70–99)
GLUCOSE BLD-MCNC: 123 MG/DL (ref 70–99)
GLUCOSE BLD-MCNC: 124 MG/DL (ref 70–99)
GLUCOSE BLD-MCNC: 126 MG/DL (ref 65–99)
GLUCOSE BLD-MCNC: 127 MG/DL (ref 70–99)
GLUCOSE BLD-MCNC: 128 MG/DL (ref 70–99)
GLUCOSE BLD-MCNC: 129 MG/DL (ref 70–99)
GLUCOSE BLD-MCNC: 130 MG/DL (ref 65–99)
GLUCOSE BLD-MCNC: 132 MG/DL (ref 70–99)
GLUCOSE BLD-MCNC: 133 MG/DL (ref 70–99)
GLUCOSE BLD-MCNC: 134 MG/DL (ref 70–99)
GLUCOSE BLD-MCNC: 134 MG/DL (ref 70–99)
GLUCOSE BLD-MCNC: 135 MG/DL (ref 70–99)
GLUCOSE BLD-MCNC: 137 MG/DL (ref 70–99)
GLUCOSE BLD-MCNC: 138 MG/DL (ref 65–99)
GLUCOSE BLD-MCNC: 141 MG/DL (ref 70–99)
GLUCOSE BLD-MCNC: 142 MG/DL (ref 70–99)
GLUCOSE BLD-MCNC: 144 MG/DL (ref 65–99)
GLUCOSE BLD-MCNC: 144 MG/DL (ref 70–99)
GLUCOSE BLD-MCNC: 145 MG/DL (ref 65–99)
GLUCOSE BLD-MCNC: 145 MG/DL (ref 70–99)
GLUCOSE BLD-MCNC: 145 MG/DL (ref 70–99)
GLUCOSE BLD-MCNC: 147 MG/DL (ref 70–99)
GLUCOSE BLD-MCNC: 148 MG/DL (ref 70–99)
GLUCOSE BLD-MCNC: 149 MG/DL (ref 70–99)
GLUCOSE BLD-MCNC: 150 MG/DL (ref 70–99)
GLUCOSE BLD-MCNC: 151 MG/DL (ref 70–99)
GLUCOSE BLD-MCNC: 153 MG/DL (ref 65–99)
GLUCOSE BLD-MCNC: 153 MG/DL (ref 70–99)
GLUCOSE BLD-MCNC: 154 MG/DL (ref 65–99)
GLUCOSE BLD-MCNC: 155 MG/DL (ref 70–99)
GLUCOSE BLD-MCNC: 157 MG/DL (ref 65–99)
GLUCOSE BLD-MCNC: 157 MG/DL (ref 70–99)
GLUCOSE BLD-MCNC: 16 MG/DL (ref 70–99)
GLUCOSE BLD-MCNC: 16 MG/DL (ref 70–99)
GLUCOSE BLD-MCNC: 161 MG/DL (ref 65–99)
GLUCOSE BLD-MCNC: 162 MG/DL (ref 70–99)
GLUCOSE BLD-MCNC: 162 MG/DL (ref 70–99)
GLUCOSE BLD-MCNC: 166 MG/DL (ref 70–99)
GLUCOSE BLD-MCNC: 169 MG/DL (ref 70–99)
GLUCOSE BLD-MCNC: 171 MG/DL (ref 70–99)
GLUCOSE BLD-MCNC: 176 MG/DL (ref 70–99)
GLUCOSE BLD-MCNC: 179 MG/DL (ref 65–99)
GLUCOSE BLD-MCNC: 180 MG/DL (ref 70–99)
GLUCOSE BLD-MCNC: 181 MG/DL (ref 65–99)
GLUCOSE BLD-MCNC: 182 MG/DL (ref 70–99)
GLUCOSE BLD-MCNC: 182 MG/DL (ref 70–99)
GLUCOSE BLD-MCNC: 189 MG/DL (ref 65–99)
GLUCOSE BLD-MCNC: 190 MG/DL (ref 65–99)
GLUCOSE BLD-MCNC: 191 MG/DL (ref 65–99)
GLUCOSE BLD-MCNC: 193 MG/DL (ref 70–99)
GLUCOSE BLD-MCNC: 197 MG/DL (ref 70–99)
GLUCOSE BLD-MCNC: 201 MG/DL (ref 70–99)
GLUCOSE BLD-MCNC: 204 MG/DL (ref 65–99)
GLUCOSE BLD-MCNC: 206 MG/DL (ref 70–99)
GLUCOSE BLD-MCNC: 211 MG/DL (ref 65–99)
GLUCOSE BLD-MCNC: 212 MG/DL (ref 70–99)
GLUCOSE BLD-MCNC: 219 MG/DL (ref 70–99)
GLUCOSE BLD-MCNC: 222 MG/DL (ref 70–99)
GLUCOSE BLD-MCNC: 226 MG/DL (ref 70–99)
GLUCOSE BLD-MCNC: 226 MG/DL (ref 70–99)
GLUCOSE BLD-MCNC: 230 MG/DL (ref 65–99)
GLUCOSE BLD-MCNC: 240 MG/DL (ref 70–99)
GLUCOSE BLD-MCNC: 241 MG/DL (ref 65–99)
GLUCOSE BLD-MCNC: 241 MG/DL (ref 70–99)
GLUCOSE BLD-MCNC: 25 MG/DL (ref 70–99)
GLUCOSE BLD-MCNC: 254 MG/DL (ref 70–99)
GLUCOSE BLD-MCNC: 255 MG/DL (ref 65–99)
GLUCOSE BLD-MCNC: 271 MG/DL (ref 65–99)
GLUCOSE BLD-MCNC: 286 MG/DL (ref 70–99)
GLUCOSE BLD-MCNC: 292 MG/DL (ref 65–99)
GLUCOSE BLD-MCNC: 304 MG/DL (ref 70–99)
GLUCOSE BLD-MCNC: 338 MG/DL (ref 70–99)
GLUCOSE BLD-MCNC: 37 MG/DL (ref 70–99)
GLUCOSE BLD-MCNC: 371 MG/DL (ref 70–99)
GLUCOSE BLD-MCNC: 373 MG/DL (ref 70–99)
GLUCOSE BLD-MCNC: 459 MG/DL (ref 70–99)
GLUCOSE BLD-MCNC: 49 MG/DL (ref 70–99)
GLUCOSE BLD-MCNC: 492 MG/DL (ref 70–99)
GLUCOSE BLD-MCNC: 50 MG/DL (ref 70–99)
GLUCOSE BLD-MCNC: 50 MG/DL (ref 70–99)
GLUCOSE BLD-MCNC: 55 MG/DL (ref 70–99)
GLUCOSE BLD-MCNC: 55 MG/DL (ref 70–99)
GLUCOSE BLD-MCNC: 58 MG/DL (ref 70–99)
GLUCOSE BLD-MCNC: 59 MG/DL (ref 70–99)
GLUCOSE BLD-MCNC: 62 MG/DL (ref 65–99)
GLUCOSE BLD-MCNC: 649 MG/DL (ref 70–99)
GLUCOSE BLD-MCNC: 73 MG/DL (ref 65–99)
GLUCOSE BLD-MCNC: 73 MG/DL (ref 70–99)
GLUCOSE BLD-MCNC: 75 MG/DL (ref 65–99)
GLUCOSE BLD-MCNC: 75 MG/DL (ref 70–99)
GLUCOSE BLD-MCNC: 78 MG/DL (ref 70–99)
GLUCOSE BLD-MCNC: 79 MG/DL (ref 70–99)
GLUCOSE BLD-MCNC: 80 MG/DL (ref 70–99)
GLUCOSE BLD-MCNC: 81 MG/DL (ref 70–99)
GLUCOSE BLD-MCNC: 82 MG/DL (ref 70–99)
GLUCOSE BLD-MCNC: 82 MG/DL (ref 70–99)
GLUCOSE BLD-MCNC: 83 MG/DL (ref 70–99)
GLUCOSE BLD-MCNC: 85 MG/DL (ref 70–99)
GLUCOSE BLD-MCNC: 86 MG/DL (ref 70–99)
GLUCOSE BLD-MCNC: 87 MG/DL (ref 70–99)
GLUCOSE BLD-MCNC: 89 MG/DL (ref 70–99)
GLUCOSE BLD-MCNC: 90 MG/DL (ref 70–99)
GLUCOSE BLD-MCNC: 91 MG/DL (ref 65–99)
GLUCOSE BLD-MCNC: 91 MG/DL (ref 70–99)
GLUCOSE BLD-MCNC: 94 MG/DL (ref 70–99)
GLUCOSE BLD-MCNC: 94 MG/DL (ref 70–99)
GLUCOSE BLD-MCNC: 95 MG/DL (ref 70–99)
GLUCOSE BLD-MCNC: 96 MG/DL (ref 70–99)
GLUCOSE BLD-MCNC: 97 MG/DL (ref 70–99)
GLUCOSE BLD-MCNC: 97 MG/DL (ref 70–99)
GLUCOSE BLD-MCNC: 98 MG/DL (ref 65–99)
GLUCOSE BLD-MCNC: 98 MG/DL (ref 70–99)
GLUCOSE BLD-MCNC: 98 MG/DL (ref 70–99)
GLUCOSE BLD-MCNC: >600 MG/DL (ref 70–99)
GLUCOSE UR STRIP.AUTO-MCNC: 150 MG/DL
GLUCOSE UR STRIP.AUTO-MCNC: >=500 MG/DL
GLUCOSE UR STRIP.AUTO-MCNC: >=500 MG/DL
GLUCOSE UR STRIP.AUTO-MCNC: NEGATIVE MG/DL
HAV IGM SER QL: 0.8 MG/DL (ref 1.6–2.6)
HAV IGM SER QL: 1.2 MG/DL (ref 1.6–2.6)
HAV IGM SER QL: 1.2 MG/DL (ref 1.8–2.5)
HAV IGM SER QL: 1.4 MG/DL (ref 1.6–2.6)
HAV IGM SER QL: 1.6 MG/DL (ref 1.6–2.6)
HAV IGM SER QL: 1.7 MG/DL (ref 1.6–2.6)
HAV IGM SER QL: 1.7 MG/DL (ref 1.6–2.6)
HAV IGM SER QL: 1.8 MG/DL (ref 1.6–2.6)
HAV IGM SER QL: 1.8 MG/DL (ref 1.6–2.6)
HAV IGM SER QL: 2.1 MG/DL (ref 1.8–2.5)
HAV IGM SER QL: 2.6 MG/DL (ref 1.6–2.6)
HAV IGM SER QL: 2.6 MG/DL (ref 1.6–2.6)
HBA1C MFR BLD HPLC: 10.3 % (ref ?–5.7)
HBA1C MFR BLD HPLC: 11.3 % (ref ?–5.7)
HBA1C MFR BLD HPLC: 8.2 % (ref ?–5.7)
HBA1C MFR BLD HPLC: 9.3 % (ref ?–5.7)
HCT VFR BLD AUTO: 21.1 % (ref 39–53)
HCT VFR BLD AUTO: 22.9 % (ref 39–53)
HCT VFR BLD AUTO: 26.3 % (ref 37–53)
HCT VFR BLD AUTO: 27.1 % (ref 37–53)
HCT VFR BLD AUTO: 27.5 % (ref 37–53)
HCT VFR BLD AUTO: 28.6 % (ref 39–53)
HCT VFR BLD AUTO: 30.5 % (ref 39–53)
HCT VFR BLD AUTO: 31.9 % (ref 39–53)
HCT VFR BLD AUTO: 32 % (ref 39–53)
HCT VFR BLD AUTO: 32.1 % (ref 39–53)
HCT VFR BLD AUTO: 32.8 % (ref 39–53)
HCT VFR BLD AUTO: 33.6 % (ref 39–53)
HCT VFR BLD AUTO: 36.5 % (ref 37–53)
HCT VFR BLD AUTO: 37.4 % (ref 39–53)
HCT VFR BLD AUTO: 38.6 % (ref 39–53)
HCT VFR BLD AUTO: 39.9 % (ref 39–53)
HDLC SERPL-MCNC: 8 MG/DL (ref 40–59)
HGB BLD-MCNC: 10.1 G/DL (ref 13–17.5)
HGB BLD-MCNC: 11.4 G/DL (ref 13–17.5)
HGB BLD-MCNC: 11.6 G/DL (ref 13–17)
HGB BLD-MCNC: 12.3 G/DL (ref 13–17.5)
HGB BLD-MCNC: 12.3 G/DL (ref 13–17.5)
HGB BLD-MCNC: 12.6 G/DL (ref 13–17.5)
HGB BLD-MCNC: 6.3 G/DL (ref 13–17.5)
HGB BLD-MCNC: 7.3 G/DL (ref 13–17.5)
HGB BLD-MCNC: 8.2 G/DL (ref 13–17)
HGB BLD-MCNC: 8.2 G/DL (ref 13–17)
HGB BLD-MCNC: 8.7 G/DL (ref 13–17)
HGB BLD-MCNC: 8.9 G/DL (ref 13–17.5)
HGB BLD-MCNC: 9 G/DL (ref 13–17.5)
HGB BLD-MCNC: 9.3 G/DL (ref 13–17.5)
HGB BLD-MCNC: 9.8 G/DL (ref 13–17.5)
HGB BLD-MCNC: 9.9 G/DL (ref 13–17.5)
HGB BLD-MCNC: 9.9 G/DL (ref 13–17.5)
HGB RETIC QN AUTO: 22.8 PG (ref 28.2–36.6)
HYALINE CASTS #/AREA URNS AUTO: PRESENT /LPF
HYALINE CASTS #/AREA URNS AUTO: PRESENT /LPF
IMM GRANULOCYTES # BLD AUTO: 0.01 X10(3) UL (ref 0–1)
IMM GRANULOCYTES # BLD AUTO: 0.03 X10(3) UL (ref 0–1)
IMM GRANULOCYTES # BLD AUTO: 0.04 X10(3) UL (ref 0–1)
IMM GRANULOCYTES # BLD AUTO: 0.3 X10(3) UL (ref 0–1)
IMM GRANULOCYTES NFR BLD: 0.1 %
IMM GRANULOCYTES NFR BLD: 0.3 %
IMM GRANULOCYTES NFR BLD: 0.4 %
IMM GRANULOCYTES NFR BLD: 0.4 %
IMM GRANULOCYTES NFR BLD: 1.4 %
IMM RETICS NFR: 0.27 RATIO (ref 0.1–0.3)
IMMATURE GRANULOCYTE COUNT: 0.05 X10(3) UL (ref 0–1)
IMMATURE GRANULOCYTE COUNT: 0.07 X10(3) UL (ref 0–1)
IMMATURE GRANULOCYTE COUNT: 0.1 X10(3) UL (ref 0–1)
IMMATURE GRANULOCYTE RATIO %: 0.5 %
IMMATURE GRANULOCYTE RATIO %: 0.5 %
IMMATURE GRANULOCYTE RATIO %: 0.6 %
INR BLD: 0.98 (ref 0.9–1.1)
INR BLD: 1 (ref 0.9–1.1)
INR BLD: 1.04 (ref 0.9–1.1)
INR BLD: 1.07 (ref 0.9–1.1)
INR BLD: 1.12 (ref 0.9–1.1)
IONIZED CALCIUM: 1.05 MMOL/L (ref 1.12–1.32)
IONIZED CALCIUM: 1.09 MMOL/L (ref 1.12–1.32)
IONIZED CALCIUM: 1.09 MMOL/L (ref 1.12–1.32)
IONIZED CALCIUM: 1.13 MMOL/L (ref 1.12–1.32)
IONIZED CALCIUM: 1.14 MMOL/L (ref 1.12–1.32)
IONIZED CALCIUM: 1.15 MMOL/L (ref 1.12–1.32)
IONIZED CALCIUM: 1.16 MMOL/L (ref 1.12–1.32)
IRON SATURATION: 3 % (ref 20–50)
IRON SERPL-MCNC: 13 UG/DL (ref 65–175)
KETONES UR STRIP.AUTO-MCNC: 80 MG/DL
KETONES UR STRIP.AUTO-MCNC: NEGATIVE MG/DL
LACTATE SERPL-SCNC: 4.5 MMOL/L (ref 0.4–2)
LACTATE SERPL-SCNC: 4.7 MMOL/L (ref 0.4–2)
LACTIC ACID ARTERIAL: 4.3 MMOL/L (ref 0.5–2)
LACTIC ACID ARTERIAL: 4.5 MMOL/L (ref 0.5–2)
LACTIC ACID ARTERIAL: 4.9 MMOL/L (ref 0.5–2)
LACTIC ACID ARTERIAL: 5.5 MMOL/L (ref 0.5–2)
LACTIC ACID ARTERIAL: 5.8 MMOL/L (ref 0.5–2)
LACTIC ACID ARTERIAL: 6.7 MMOL/L (ref 0.5–2)
LACTIC ACID ARTERIAL: 8.7 MMOL/L (ref 0.5–2)
LDLC SERPL DIRECT ASSAY-MCNC: 54 MG/DL (ref ?–100)
LEUKOCYTE ESTERASE UR QL STRIP.AUTO: NEGATIVE
LIPASE SERPL-CCNC: 146 U/L (ref 73–393)
LIPASE SERPL-CCNC: 158 U/L (ref 73–393)
LIPASE SERPL-CCNC: 269 U/L (ref 73–393)
LIPASE: 572 U/L (ref 73–393)
LYMPHOCYTE % MANUAL: 50 %
LYMPHOCYTE ABSOLUTE MANUAL: 6 X10(3) UL (ref 0.9–4)
LYMPHOCYTES # BLD AUTO: 1.03 X10(3) UL (ref 1–4)
LYMPHOCYTES # BLD AUTO: 1.12 X10(3) UL (ref 1–4)
LYMPHOCYTES # BLD AUTO: 1.44 X10(3) UL (ref 1–4)
LYMPHOCYTES # BLD AUTO: 2 X10(3) UL (ref 0.9–4)
LYMPHOCYTES # BLD AUTO: 2.11 X10(3) UL (ref 1–4)
LYMPHOCYTES # BLD AUTO: 2.4 X10(3) UL (ref 0.9–4)
LYMPHOCYTES # BLD AUTO: 2.92 X10(3) UL (ref 0.9–4)
LYMPHOCYTES # BLD AUTO: 3.89 X10(3) UL (ref 1–4)
LYMPHOCYTES # BLD AUTO: 4.1 X10(3) UL (ref 1–4)
LYMPHOCYTES # BLD AUTO: 4.13 X10(3) UL (ref 1–4)
LYMPHOCYTES # BLD AUTO: 4.61 X10(3) UL (ref 1–4)
LYMPHOCYTES # BLD AUTO: 4.64 X10(3) UL (ref 1–4)
LYMPHOCYTES NFR BLD AUTO: 12.6 %
LYMPHOCYTES NFR BLD AUTO: 17.6 %
LYMPHOCYTES NFR BLD AUTO: 17.6 %
LYMPHOCYTES NFR BLD AUTO: 21.5 %
LYMPHOCYTES NFR BLD AUTO: 30.1 %
LYMPHOCYTES NFR BLD AUTO: 31.3 %
LYMPHOCYTES NFR BLD AUTO: 39.4 %
LYMPHOCYTES NFR BLD AUTO: 42.4 %
LYMPHOCYTES NFR BLD AUTO: 47.6 %
LYMPHOCYTES NFR BLD AUTO: 48 %
LYMPHOCYTES NFR BLD AUTO: 5.1 %
LYMPHOCYTES NFR BLD AUTO: 8.5 %
LYMPHOCYTES NFR BLD: 1.31 X10(3) UL (ref 1–4)
LYMPHOCYTES NFR BLD: 1.97 X10(3) UL (ref 1–4)
LYMPHOCYTES NFR BLD: 11 %
LYMPHOCYTES NFR BLD: 13 %
LYMPHOCYTES NFR BLD: 17 %
LYMPHOCYTES NFR BLD: 3.32 X10(3) UL (ref 1–4)
LYMPHOCYTES NFR BLD: 3.49 X10(3) UL (ref 1–4)
LYMPHOCYTES NFR BLD: 6 %
M PROTEIN MFR SERPL ELPH: 5.3 G/DL (ref 6.4–8.2)
M PROTEIN MFR SERPL ELPH: 5.4 G/DL (ref 6.4–8.2)
M PROTEIN MFR SERPL ELPH: 5.8 G/DL (ref 6.4–8.2)
M PROTEIN MFR SERPL ELPH: 6.1 G/DL (ref 6.4–8.2)
M PROTEIN MFR SERPL ELPH: 6.4 G/DL (ref 6.4–8.2)
M PROTEIN MFR SERPL ELPH: 6.5 G/DL (ref 6.4–8.2)
M PROTEIN MFR SERPL ELPH: 6.8 G/DL (ref 6.4–8.2)
M PROTEIN MFR SERPL ELPH: 7.5 G/DL (ref 6.4–8.2)
M PROTEIN MFR SERPL ELPH: 8.2 G/DL (ref 6.4–8.2)
M PROTEIN MFR SERPL ELPH: 8.6 G/DL (ref 6.4–8.2)
M PROTEIN MFR SERPL ELPH: 9 G/DL (ref 6.4–8.2)
M PROTEIN MFR SERPL ELPH: 9.6 G/DL (ref 6.4–8.2)
MCH RBC QN AUTO: 24.1 PG (ref 26–34)
MCH RBC QN AUTO: 24.6 PG (ref 27–33.2)
MCH RBC QN AUTO: 25.1 PG (ref 26–34)
MCH RBC QN AUTO: 25.2 PG (ref 27–33.2)
MCH RBC QN AUTO: 25.4 PG (ref 27–33.2)
MCH RBC QN AUTO: 25.4 PG (ref 27–33.2)
MCH RBC QN AUTO: 25.8 PG (ref 26–34)
MCH RBC QN AUTO: 25.8 PG (ref 26–34)
MCH RBC QN AUTO: 26.1 PG (ref 26–34)
MCH RBC QN AUTO: 26.3 PG (ref 26–34)
MCH RBC QN AUTO: 26.7 PG (ref 26–34)
MCH RBC QN AUTO: 26.8 PG (ref 26–34)
MCH RBC QN AUTO: 27.2 PG (ref 26–34)
MCH RBC QN AUTO: 27.4 PG (ref 26–34)
MCH RBC QN AUTO: 27.4 PG (ref 26–34)
MCH RBC QN AUTO: 28.9 PG (ref 26–34)
MCHC RBC AUTO-ENTMCNC: 29.9 G/DL (ref 31–37)
MCHC RBC AUTO-ENTMCNC: 29.9 G/DL (ref 31–37)
MCHC RBC AUTO-ENTMCNC: 30.3 G/DL (ref 31–37)
MCHC RBC AUTO-ENTMCNC: 30.5 G/DL (ref 31–37)
MCHC RBC AUTO-ENTMCNC: 30.8 G/DL (ref 31–37)
MCHC RBC AUTO-ENTMCNC: 30.8 G/DL (ref 31–37)
MCHC RBC AUTO-ENTMCNC: 30.9 G/DL (ref 31–37)
MCHC RBC AUTO-ENTMCNC: 31.2 G/DL (ref 31–37)
MCHC RBC AUTO-ENTMCNC: 31.5 G/DL (ref 31–37)
MCHC RBC AUTO-ENTMCNC: 31.6 G/DL (ref 31–37)
MCHC RBC AUTO-ENTMCNC: 31.7 G/DL (ref 31–37)
MCHC RBC AUTO-ENTMCNC: 31.8 G/DL (ref 31–37)
MCHC RBC AUTO-ENTMCNC: 31.9 G/DL (ref 31–37)
MCHC RBC AUTO-ENTMCNC: 32.6 G/DL (ref 31–37)
MCHC RBC AUTO-ENTMCNC: 32.9 G/DL (ref 31–37)
MCHC RBC AUTO-ENTMCNC: 33.9 G/DL (ref 31–37)
MCV RBC AUTO: 77.3 FL (ref 80–99)
MCV RBC AUTO: 78.7 FL (ref 80–100)
MCV RBC AUTO: 79.9 FL (ref 80–100)
MCV RBC AUTO: 80.4 FL (ref 80–99)
MCV RBC AUTO: 80.7 FL (ref 80–99)
MCV RBC AUTO: 80.8 FL (ref 80–100)
MCV RBC AUTO: 80.9 FL (ref 80–100)
MCV RBC AUTO: 83.3 FL (ref 80–100)
MCV RBC AUTO: 83.8 FL (ref 80–100)
MCV RBC AUTO: 83.9 FL (ref 80–99)
MCV RBC AUTO: 85.4 FL (ref 80–100)
MCV RBC AUTO: 85.7 FL (ref 80–100)
MCV RBC AUTO: 86 FL (ref 80–100)
MCV RBC AUTO: 86.5 FL (ref 80–100)
MCV RBC AUTO: 88.5 FL (ref 80–100)
MCV RBC AUTO: 91.6 FL (ref 80–100)
METAMYELOCYTES # BLD: 0.26 X10(3) UL
METAMYELOCYTES NFR BLD: 1 %
METHEMOGLOBIN: 0.5 % SAT (ref 0.4–1.5)
METHEMOGLOBIN: 0.6 % SAT (ref 0.4–1.5)
METHEMOGLOBIN: 0.7 % SAT (ref 0.4–1.5)
METHEMOGLOBIN: 0.7 % SAT (ref 0.4–1.5)
METHEMOGLOBIN: 0.8 % SAT (ref 0.4–1.5)
METHEMOGLOBIN: 0.9 % SAT (ref 0.4–1.5)
MONOCYTE % MANUAL: 5 %
MONOCYTE ABSOLUTE MANUAL: 0.6 X10(3) UL (ref 0.1–1)
MONOCYTES # BLD AUTO: 0.72 X10(3) UL (ref 0.1–1)
MONOCYTES # BLD AUTO: 0.82 X10(3) UL (ref 0.1–1)
MONOCYTES # BLD AUTO: 0.98 X10(3) UL (ref 0.1–1)
MONOCYTES # BLD AUTO: 1.03 X10(3) UL (ref 0.1–1)
MONOCYTES # BLD AUTO: 1.09 X10(3) UL (ref 0.1–1)
MONOCYTES # BLD AUTO: 1.12 X10(3) UL (ref 0.1–1)
MONOCYTES # BLD AUTO: 1.14 X10(3) UL (ref 0.1–1)
MONOCYTES # BLD AUTO: 1.34 X10(3) UL (ref 0.1–1)
MONOCYTES # BLD AUTO: 1.4 X10(3) UL (ref 0.1–1)
MONOCYTES # BLD AUTO: 1.5 X10(3) UL (ref 0.1–1)
MONOCYTES # BLD AUTO: 1.52 X10(3) UL (ref 0.1–1)
MONOCYTES # BLD AUTO: 1.59 X10(3) UL (ref 0.1–1)
MONOCYTES # BLD: 0.21 X10(3) UL (ref 0.1–1)
MONOCYTES # BLD: 0.26 X10(3) UL (ref 0.1–1)
MONOCYTES # BLD: 0.65 X10(3) UL (ref 0.1–1)
MONOCYTES # BLD: 0.9 X10(3) UL (ref 0.1–1)
MONOCYTES NFR BLD AUTO: 10.2 %
MONOCYTES NFR BLD AUTO: 10.5 %
MONOCYTES NFR BLD AUTO: 11.2 %
MONOCYTES NFR BLD AUTO: 11.4 %
MONOCYTES NFR BLD AUTO: 13 %
MONOCYTES NFR BLD AUTO: 16.4 %
MONOCYTES NFR BLD AUTO: 6.4 %
MONOCYTES NFR BLD AUTO: 8.2 %
MONOCYTES NFR BLD AUTO: 8.8 %
MONOCYTES NFR BLD AUTO: 8.9 %
MONOCYTES NFR BLD AUTO: 9.4 %
MONOCYTES NFR BLD AUTO: 9.5 %
MONOCYTES NFR BLD: 1 %
MONOCYTES NFR BLD: 1 %
MONOCYTES NFR BLD: 3 %
MONOCYTES NFR BLD: 5 %
MYELOCYTES # BLD: 0.26 X10(3) UL
MYELOCYTES NFR BLD: 1 %
NEUTROPHIL ABS PRELIM: 12.18 X10 (3) UL (ref 1.3–6.7)
NEUTROPHIL ABS PRELIM: 4.88 X10 (3) UL (ref 1.3–6.7)
NEUTROPHIL ABS PRELIM: 5.04 X10 (3) UL (ref 1.3–6.7)
NEUTROPHIL ABS PRELIM: 9.97 X10 (3) UL (ref 1.3–6.7)
NEUTROPHIL ABSOLUTE MANUAL: 5.4 X10(3) UL (ref 1.3–6.7)
NEUTROPHILS # BLD AUTO: 12.18 X10(3) UL (ref 1.3–6.7)
NEUTROPHILS # BLD AUTO: 13.55 X10 (3) UL (ref 1.5–7.7)
NEUTROPHILS # BLD AUTO: 16.27 X10 (3) UL (ref 1.5–7.7)
NEUTROPHILS # BLD AUTO: 18.97 X10 (3) UL (ref 1.5–7.7)
NEUTROPHILS # BLD AUTO: 18.97 X10(3) UL (ref 1.5–7.7)
NEUTROPHILS # BLD AUTO: 21.28 X10 (3) UL (ref 1.5–7.7)
NEUTROPHILS # BLD AUTO: 3.44 X10 (3) UL (ref 1.5–7.7)
NEUTROPHILS # BLD AUTO: 3.44 X10(3) UL (ref 1.5–7.7)
NEUTROPHILS # BLD AUTO: 3.87 X10 (3) UL (ref 1.5–7.7)
NEUTROPHILS # BLD AUTO: 3.87 X10(3) UL (ref 1.5–7.7)
NEUTROPHILS # BLD AUTO: 4.37 X10 (3) UL (ref 1.5–7.7)
NEUTROPHILS # BLD AUTO: 4.37 X10(3) UL (ref 1.5–7.7)
NEUTROPHILS # BLD AUTO: 5.04 X10(3) UL (ref 1.3–6.7)
NEUTROPHILS # BLD AUTO: 5.45 X10 (3) UL (ref 1.5–7.7)
NEUTROPHILS # BLD AUTO: 5.45 X10(3) UL (ref 1.5–7.7)
NEUTROPHILS # BLD AUTO: 5.96 X10 (3) UL (ref 1.5–7.7)
NEUTROPHILS # BLD AUTO: 5.96 X10(3) UL (ref 1.5–7.7)
NEUTROPHILS # BLD AUTO: 6.58 X10 (3) UL (ref 1.5–7.7)
NEUTROPHILS # BLD AUTO: 6.58 X10(3) UL (ref 1.5–7.7)
NEUTROPHILS # BLD AUTO: 7.47 X10 (3) UL (ref 1.5–7.7)
NEUTROPHILS # BLD AUTO: 7.47 X10(3) UL (ref 1.5–7.7)
NEUTROPHILS # BLD AUTO: 9.86 X10 (3) UL (ref 1.5–7.7)
NEUTROPHILS # BLD AUTO: 9.86 X10(3) UL (ref 1.5–7.7)
NEUTROPHILS # BLD AUTO: 9.97 X10(3) UL (ref 1.3–6.7)
NEUTROPHILS % MANUAL: 45 %
NEUTROPHILS NFR BLD AUTO: 39.7 %
NEUTROPHILS NFR BLD AUTO: 40.1 %
NEUTROPHILS NFR BLD AUTO: 46.6 %
NEUTROPHILS NFR BLD AUTO: 47.8 %
NEUTROPHILS NFR BLD AUTO: 52 %
NEUTROPHILS NFR BLD AUTO: 57.1 %
NEUTROPHILS NFR BLD AUTO: 67.2 %
NEUTROPHILS NFR BLD AUTO: 72.8 %
NEUTROPHILS NFR BLD AUTO: 73.2 %
NEUTROPHILS NFR BLD AUTO: 76.9 %
NEUTROPHILS NFR BLD AUTO: 81.7 %
NEUTROPHILS NFR BLD AUTO: 86.9 %
NEUTROPHILS NFR BLD: 65 %
NEUTROPHILS NFR BLD: 70 %
NEUTROPHILS NFR BLD: 70 %
NEUTROPHILS NFR BLD: 73 %
NEUTS BAND NFR BLD: 11 %
NEUTS BAND NFR BLD: 14 %
NEUTS BAND NFR BLD: 17 %
NEUTS BAND NFR BLD: 21 %
NEUTS HYPERSEG # BLD: 15.04 X10(3) UL (ref 1.5–7.7)
NEUTS HYPERSEG # BLD: 16.81 X10(3) UL (ref 1.5–7.7)
NEUTS HYPERSEG # BLD: 19.84 X10(3) UL (ref 1.5–7.7)
NEUTS HYPERSEG # BLD: 21.42 X10(3) UL (ref 1.5–7.7)
NITRITE UR QL STRIP.AUTO: NEGATIVE
NONHDLC SERPL-MCNC: 111 MG/DL (ref ?–130)
NRBC BLD MANUAL-RTO: 7 %
OPIATE URINE: NEGATIVE
OPIATES UR QL SCN: NEGATIVE
OSMOLALITY SERPL CALC.SUM OF ELEC: 276 MOSM/KG (ref 275–295)
OSMOLALITY SERPL CALC.SUM OF ELEC: 279 MOSM/KG (ref 275–295)
OSMOLALITY SERPL CALC.SUM OF ELEC: 280 MOSM/KG (ref 275–295)
OSMOLALITY SERPL CALC.SUM OF ELEC: 280 MOSM/KG (ref 275–295)
OSMOLALITY SERPL CALC.SUM OF ELEC: 282 MOSM/KG (ref 275–295)
OSMOLALITY SERPL CALC.SUM OF ELEC: 287 MOSM/KG (ref 275–295)
OSMOLALITY SERPL CALC.SUM OF ELEC: 289 MOSM/KG (ref 275–295)
OSMOLALITY SERPL CALC.SUM OF ELEC: 291 MOSM/KG (ref 275–295)
OSMOLALITY SERPL CALC.SUM OF ELEC: 291 MOSM/KG (ref 275–295)
OSMOLALITY SERPL CALC.SUM OF ELEC: 292 MOSM/KG (ref 275–295)
OSMOLALITY SERPL CALC.SUM OF ELEC: 296 MOSM/KG (ref 275–295)
OSMOLALITY SERPL CALC.SUM OF ELEC: 296 MOSM/KG (ref 275–295)
OSMOLALITY SERPL CALC.SUM OF ELEC: 302 MOSM/KG (ref 275–295)
OSMOLALITY SERPL CALC.SUM OF ELEC: 305 MOSM/KG (ref 275–295)
OSMOLALITY SERPL CALC.SUM OF ELEC: 308 MOSM/KG (ref 275–295)
OSMOLALITY SERPL CALC.SUM OF ELEC: 310 MOSM/KG (ref 275–295)
P AXIS: 47 DEGREES
P AXIS: 54 DEGREES
P AXIS: 67 DEGREES
P-R INTERVAL: 138 MS
P-R INTERVAL: 146 MS
P-R INTERVAL: 148 MS
P/F RATIO: 124.1 MMHG
P/F RATIO: 213.2 MMHG
P/F RATIO: 213.2 MMHG
P/F RATIO: 267.5 MMHG
P/F RATIO: 360.6 MMHG
P/F RATIO: 385.1 MMHG
P/F RATIO: 391.7 MMHG
P/F RATIO: 481.1 MMHG
PATIENT TEMPERATURE: 100.6 F
PATIENT TEMPERATURE: 100.6 F
PATIENT TEMPERATURE: 101 F
PATIENT TEMPERATURE: 97.3 F
PATIENT TEMPERATURE: 98.6 F
PCP UR QL SCN: NEGATIVE
PCP URINE: NEGATIVE
PEEP: 5 CM H2O
PH UR STRIP.AUTO: 5 [PH] (ref 4.5–8)
PH UR STRIP.AUTO: 6 [PH] (ref 4.5–8)
PH UR STRIP.AUTO: 6 [PH] (ref 4.5–8)
PH UR STRIP.AUTO: 8 [PH] (ref 4.5–8)
PHOSPHATE SERPL-MCNC: 1.7 MG/DL (ref 2.5–4.9)
PHOSPHATE SERPL-MCNC: 2 MG/DL (ref 2.5–4.9)
PHOSPHATE SERPL-MCNC: 2.5 MG/DL (ref 2.5–4.9)
PHOSPHATE SERPL-MCNC: 2.9 MG/DL (ref 2.5–4.9)
PHOSPHATE SERPL-MCNC: 4.2 MG/DL (ref 2.5–4.9)
PLATELET # BLD AUTO: 117 10(3)UL (ref 150–450)
PLATELET # BLD AUTO: 123 10(3)UL (ref 150–450)
PLATELET # BLD AUTO: 137 10(3)UL (ref 150–450)
PLATELET # BLD AUTO: 169 10(3)UL (ref 150–450)
PLATELET # BLD AUTO: 182 10(3)UL (ref 150–450)
PLATELET # BLD AUTO: 190 10(3)UL (ref 150–450)
PLATELET # BLD AUTO: 208 10(3)UL (ref 150–450)
PLATELET # BLD AUTO: 214 10(3)UL (ref 150–450)
PLATELET # BLD AUTO: 232 10(3)UL (ref 150–450)
PLATELET # BLD AUTO: 239 10(3)UL (ref 150–450)
PLATELET # BLD AUTO: 289 10(3)UL (ref 150–450)
PLATELET # BLD AUTO: 300 10(3)UL (ref 150–450)
PLATELET # BLD AUTO: 346 10(3)UL (ref 150–450)
PLATELET # BLD AUTO: 550 10(3)UL (ref 150–450)
PLATELET # BLD AUTO: 568 10(3)UL (ref 150–450)
PLATELET # BLD AUTO: 585 10(3)UL (ref 150–450)
PLATELET MORPHOLOGY: NORMAL
POTASSIUM BLOOD GAS: 2.6 MMOL/L (ref 3.6–5.1)
POTASSIUM BLOOD GAS: 2.7 MMOL/L (ref 3.6–5.1)
POTASSIUM BLOOD GAS: 3.3 MMOL/L (ref 3.6–5.1)
POTASSIUM BLOOD GAS: 3.4 MMOL/L (ref 3.6–5.1)
POTASSIUM BLOOD GAS: 3.5 MMOL/L (ref 3.6–5.1)
POTASSIUM BLOOD GAS: 4.6 MMOL/L (ref 3.6–5.1)
POTASSIUM SERPL-SCNC: 2.9 MMOL/L (ref 3.5–5.1)
POTASSIUM SERPL-SCNC: 3.1 MMOL/L (ref 3.5–5.1)
POTASSIUM SERPL-SCNC: 3.1 MMOL/L (ref 3.6–5.1)
POTASSIUM SERPL-SCNC: 3.4 MMOL/L (ref 3.5–5.1)
POTASSIUM SERPL-SCNC: 3.5 MMOL/L (ref 3.5–5.1)
POTASSIUM SERPL-SCNC: 3.5 MMOL/L (ref 3.6–5.1)
POTASSIUM SERPL-SCNC: 3.6 MMOL/L (ref 3.5–5.1)
POTASSIUM SERPL-SCNC: 3.8 MMOL/L (ref 3.5–5.1)
POTASSIUM SERPL-SCNC: 3.8 MMOL/L (ref 3.6–5.1)
POTASSIUM SERPL-SCNC: 3.8 MMOL/L (ref 3.6–5.1)
POTASSIUM SERPL-SCNC: 3.9 MMOL/L (ref 3.5–5.1)
POTASSIUM SERPL-SCNC: 4 MMOL/L (ref 3.5–5.1)
POTASSIUM SERPL-SCNC: 4.1 MMOL/L (ref 3.5–5.1)
POTASSIUM SERPL-SCNC: 4.1 MMOL/L (ref 3.5–5.1)
POTASSIUM SERPL-SCNC: 4.1 MMOL/L (ref 3.6–5.1)
POTASSIUM SERPL-SCNC: 4.2 MMOL/L (ref 3.5–5.1)
POTASSIUM SERPL-SCNC: 4.6 MMOL/L (ref 3.5–5.1)
POTASSIUM SERPL-SCNC: 4.7 MMOL/L (ref 3.5–5.1)
POTASSIUM SERPL-SCNC: 4.8 MMOL/L (ref 3.5–5.1)
PROCALCITONIN SERPL-MCNC: 0.92 NG/ML
PROT UR STRIP.AUTO-MCNC: 100 MG/DL
PROT UR STRIP.AUTO-MCNC: 100 MG/DL
PROT UR STRIP.AUTO-MCNC: NEGATIVE MG/DL
PROT UR STRIP.AUTO-MCNC: NEGATIVE MG/DL
PSA SERPL DL<=0.01 NG/ML-MCNC: 13.4 SECONDS (ref 12.5–14.7)
PSA SERPL DL<=0.01 NG/ML-MCNC: 13.6 SECONDS (ref 12.5–14.7)
PSA SERPL DL<=0.01 NG/ML-MCNC: 14 SECONDS (ref 12.5–14.7)
PSA SERPL DL<=0.01 NG/ML-MCNC: 14.4 SECONDS (ref 12.5–14.7)
PSA SERPL DL<=0.01 NG/ML-MCNC: 14.9 SECONDS (ref 12.5–14.7)
Q-T INTERVAL: 260 MS
Q-T INTERVAL: 354 MS
Q-T INTERVAL: 426 MS
Q-T INTERVAL: 474 MS
QRS DURATION: 130 MS
QRS DURATION: 72 MS
QRS DURATION: 84 MS
QRS DURATION: 84 MS
QTC CALCULATION (BEZET): 406 MS
QTC CALCULATION (BEZET): 449 MS
QTC CALCULATION (BEZET): 560 MS
QTC CALCULATION (BEZET): 673 MS
R AXIS: -3 DEGREES
R AXIS: 34 DEGREES
R AXIS: 57 DEGREES
R AXIS: 7 DEGREES
RBC # BLD AUTO: 2.61 X10(6)UL (ref 4.3–5.7)
RBC # BLD AUTO: 2.83 X10(6)UL (ref 4.3–5.7)
RBC # BLD AUTO: 3.23 X10(6)UL (ref 4.3–5.7)
RBC # BLD AUTO: 3.26 X10(6)UL (ref 4.3–5.7)
RBC # BLD AUTO: 3.42 X10(6)UL (ref 4.3–5.7)
RBC # BLD AUTO: 3.56 X10(6)UL (ref 4.3–5.7)
RBC # BLD AUTO: 3.58 X10(6)UL (ref 4.3–5.7)
RBC # BLD AUTO: 3.58 X10(6)UL (ref 4.3–5.7)
RBC # BLD AUTO: 3.7 X10(6)UL (ref 4.3–5.7)
RBC # BLD AUTO: 3.71 X10(6)UL (ref 4.3–5.7)
RBC # BLD AUTO: 3.83 X10(6)UL (ref 4.3–5.7)
RBC # BLD AUTO: 4.27 X10(6)UL (ref 4.3–5.7)
RBC # BLD AUTO: 4.36 X10(6)UL (ref 4.3–5.7)
RBC # BLD AUTO: 4.49 X10(6)UL (ref 4.3–5.7)
RBC # BLD AUTO: 4.67 X10(6)UL (ref 4.3–5.7)
RBC # BLD AUTO: 4.72 X10(6)UL (ref 4.3–5.7)
RBC UR QL AUTO: NEGATIVE
RBC UR QL AUTO: NEGATIVE
RED CELL DISTRIBUTION WIDTH-SD: 52.6 FL (ref 35.1–46.3)
RED CELL DISTRIBUTION WIDTH-SD: 54.5 FL (ref 35.1–46.3)
RED CELL DISTRIBUTION WIDTH-SD: 56.2 FL (ref 35.1–46.3)
RED CELL DISTRIBUTION WIDTH-SD: 59.3 FL (ref 35.1–46.3)
RETICS # AUTO: 89.6 X10(3) UL (ref 22.5–147.5)
RETICS/RBC NFR AUTO: 2.5 % (ref 0.5–2.5)
RH BLOOD TYPE: POSITIVE
SALICYLATES SERPL-MCNC: <1.7 MG/DL (ref 2.8–20)
SODIUM BLOOD GAS: 140 MMOL/L (ref 136–144)
SODIUM BLOOD GAS: 144 MMOL/L (ref 136–144)
SODIUM BLOOD GAS: 145 MMOL/L (ref 136–144)
SODIUM BLOOD GAS: 146 MMOL/L (ref 136–144)
SODIUM BLOOD GAS: 146 MMOL/L (ref 136–144)
SODIUM BLOOD GAS: 147 MMOL/L (ref 136–144)
SODIUM SERPL-SCNC: 125 MMOL/L (ref 136–145)
SODIUM SERPL-SCNC: 132 MMOL/L (ref 136–144)
SODIUM SERPL-SCNC: 135 MMOL/L (ref 136–144)
SODIUM SERPL-SCNC: 135 MMOL/L (ref 136–145)
SODIUM SERPL-SCNC: 136 MMOL/L (ref 136–144)
SODIUM SERPL-SCNC: 136 MMOL/L (ref 136–145)
SODIUM SERPL-SCNC: 137 MMOL/L (ref 136–144)
SODIUM SERPL-SCNC: 137 MMOL/L (ref 136–145)
SODIUM SERPL-SCNC: 138 MMOL/L (ref 136–145)
SODIUM SERPL-SCNC: 141 MMOL/L (ref 136–145)
SODIUM SERPL-SCNC: 146 MMOL/L (ref 136–145)
SODIUM SERPL-SCNC: 148 MMOL/L (ref 136–145)
SODIUM SERPL-SCNC: 148 MMOL/L (ref 136–145)
SODIUM SERPL-SCNC: 150 MMOL/L (ref 136–145)
SP GR UR STRIP.AUTO: 1.01 (ref 1–1.03)
SP GR UR STRIP.AUTO: 1.01 (ref 1–1.03)
SP GR UR STRIP.AUTO: 1.03 (ref 1–1.03)
SP GR UR STRIP.AUTO: 1.04 (ref 1–1.03)
T AXIS: 18 DEGREES
T AXIS: 233 DEGREES
T AXIS: 261 DEGREES
T AXIS: 30 DEGREES
TIDAL VOLUME: 460 ML
TIDAL VOLUME: 460 ML
TIDAL VOLUME: 500 ML
TIDAL VOLUME: 550 ML
TOTAL CELLS COUNTED: 100
TOTAL HEMOGLOBIN: 10.1 G/DL (ref 13.2–17.3)
TOTAL HEMOGLOBIN: 10.3 G/DL (ref 13.2–17.3)
TOTAL HEMOGLOBIN: 12.6 G/DL (ref 13.2–17.3)
TOTAL HEMOGLOBIN: 12.6 G/DL (ref 13.2–17.3)
TOTAL HEMOGLOBIN: 12.8 G/DL (ref 13.2–17.3)
TOTAL HEMOGLOBIN: 9.6 G/DL (ref 13.2–17.3)
TOTAL HEMOGLOBIN: 9.6 G/DL (ref 13.2–17.3)
TOTAL IRON BINDING CAPACITY: 457 UG/DL (ref 240–450)
TRANSFERRIN SERPL-MCNC: 307 MG/DL (ref 200–360)
TRIGL SERPL-MCNC: 112 MG/DL (ref 30–149)
TRIGL SERPL-MCNC: 1747 MG/DL (ref 30–149)
TROPONIN I SERPL-MCNC: 1.72 NG/ML (ref ?–0.04)
TROPONIN I SERPL-MCNC: <0.045 NG/ML (ref ?–0.04)
TROPONIN I SERPL-MCNC: <0.045 NG/ML (ref ?–0.04)
TSI SER-ACNC: 1.06 MIU/ML (ref 0.36–3.74)
UROBILINOGEN UR STRIP.AUTO-MCNC: <2 MG/DL
VALPROATE SERPL-MCNC: 103.5 UG/ML (ref 50–100)
VALPROATE SERPL-MCNC: 31.7 UG/ML (ref 50–100)
VALPROATE SERPL-MCNC: 33.4 UG/ML (ref 50–100)
VALPROATE SERPL-MCNC: 37.6 UG/ML (ref 50–100)
VALPROATE SERPL-MCNC: 5.1 UG/ML (ref 50–100)
VALPROATE SERPL-MCNC: 55.5 UG/ML (ref 50–100)
VALPROATE SERPL-MCNC: 72.2 UG/ML (ref 50–100)
VALPROATE SERPL-MCNC: 89.5 UG/ML (ref 50–100)
VALPROATE SERPL-MCNC: 95.8 UG/ML (ref 50–100)
VENOUS BASE EXCESS: 0.3
VENOUS BASE EXCESS: 2.2
VENOUS BLOOD GAS HCO3: 26 MEQ/L (ref 23–27)
VENOUS BLOOD GAS HCO3: 28.7 MEQ/L (ref 23–27)
VENOUS O2 SAT CALC: 35 % (ref 72–78)
VENOUS O2 SAT CALC: 99 % (ref 72–78)
VENOUS O2 SATURATION: 23 % (ref 72–78)
VENOUS O2 SATURATION: 95 % (ref 72–78)
VENOUS PCO2: 49 MM HG (ref 38–50)
VENOUS PCO2: 53 MM HG (ref 38–50)
VENOUS PH: 7.35 (ref 7.33–7.43)
VENOUS PH: 7.36 (ref 7.33–7.43)
VENOUS PO2: 140 MM HG (ref 30–50)
VENOUS PO2: 23 MM HG (ref 30–50)
VENT RATE: 18 /MIN
VENT RATE: 22 /MIN
VENT RATE: 24 /MIN
VENTRICULAR RATE: 104 BPM
VENTRICULAR RATE: 121 BPM
VENTRICULAR RATE: 147 BPM
VENTRICULAR RATE: 97 BPM
WBC # BLD AUTO: 11.7 X10(3) UL (ref 4–11)
WBC # BLD AUTO: 12 X10(3) UL (ref 4–13)
WBC # BLD AUTO: 12.1 X10(3) UL (ref 4–11)
WBC # BLD AUTO: 13.1 X10(3) UL (ref 4–11)
WBC # BLD AUTO: 13.6 X10(3) UL (ref 4–13)
WBC # BLD AUTO: 15.8 X10(3) UL (ref 4–13)
WBC # BLD AUTO: 17.9 X10(3) UL (ref 4–11)
WBC # BLD AUTO: 20.5 X10(3) UL (ref 4–11)
WBC # BLD AUTO: 21.8 X10(3) UL (ref 4–11)
WBC # BLD AUTO: 25.5 X10(3) UL (ref 4–11)
WBC # BLD AUTO: 8.2 X10(3) UL (ref 4–11)
WBC # BLD AUTO: 8.6 X10(3) UL (ref 4–11)
WBC # BLD AUTO: 9.2 X10(3) UL (ref 4–11)
WBC # BLD AUTO: 9.7 X10(3) UL (ref 4–13)
WBC # BLD AUTO: 9.8 X10(3) UL (ref 4–11)
WBC # BLD AUTO: 9.8 X10(3) UL (ref 4–11)

## 2019-01-01 PROCEDURE — 0D9670Z DRAINAGE OF STOMACH WITH DRAINAGE DEVICE, VIA NATURAL OR ARTIFICIAL OPENING: ICD-10-PCS | Performed by: HOSPITALIST

## 2019-01-01 PROCEDURE — 99223 1ST HOSP IP/OBS HIGH 75: CPT | Performed by: HOSPITALIST

## 2019-01-01 PROCEDURE — 93306 TTE W/DOPPLER COMPLETE: CPT | Performed by: INTERNAL MEDICINE

## 2019-01-01 PROCEDURE — 99233 SBSQ HOSP IP/OBS HIGH 50: CPT | Performed by: HOSPITALIST

## 2019-01-01 PROCEDURE — 99232 SBSQ HOSP IP/OBS MODERATE 35: CPT | Performed by: HOSPITALIST

## 2019-01-01 PROCEDURE — 70450 CT HEAD/BRAIN W/O DYE: CPT | Performed by: EMERGENCY MEDICINE

## 2019-01-01 PROCEDURE — 99285 EMERGENCY DEPT VISIT HI MDM: CPT

## 2019-01-01 PROCEDURE — 99215 OFFICE O/P EST HI 40 MIN: CPT | Performed by: FAMILY MEDICINE

## 2019-01-01 PROCEDURE — 99244 OFF/OP CNSLTJ NEW/EST MOD 40: CPT | Performed by: SURGERY

## 2019-01-01 PROCEDURE — 80053 COMPREHEN METABOLIC PANEL: CPT | Performed by: EMERGENCY MEDICINE

## 2019-01-01 PROCEDURE — 5A12012 PERFORMANCE OF CARDIAC OUTPUT, SINGLE, MANUAL: ICD-10-PCS | Performed by: NURSE PRACTITIONER

## 2019-01-01 PROCEDURE — 93010 ELECTROCARDIOGRAM REPORT: CPT

## 2019-01-01 PROCEDURE — 99239 HOSP IP/OBS DSCHRG MGMT >30: CPT | Performed by: INTERNAL MEDICINE

## 2019-01-01 PROCEDURE — 99233 SBSQ HOSP IP/OBS HIGH 50: CPT | Performed by: OTHER

## 2019-01-01 PROCEDURE — 03HY32Z INSERTION OF MONITORING DEVICE INTO UPPER ARTERY, PERCUTANEOUS APPROACH: ICD-10-PCS | Performed by: ANESTHESIOLOGY

## 2019-01-01 PROCEDURE — 74018 RADEX ABDOMEN 1 VIEW: CPT | Performed by: INTERNAL MEDICINE

## 2019-01-01 PROCEDURE — 84132 ASSAY OF SERUM POTASSIUM: CPT | Performed by: EMERGENCY MEDICINE

## 2019-01-01 PROCEDURE — 99239 HOSP IP/OBS DSCHRG MGMT >30: CPT | Performed by: HOSPITALIST

## 2019-01-01 PROCEDURE — 99292 CRITICAL CARE ADDL 30 MIN: CPT | Performed by: INTERNAL MEDICINE

## 2019-01-01 PROCEDURE — 95953 EEG MONITORING/COMPUTER: CPT | Performed by: OTHER

## 2019-01-01 PROCEDURE — 99291 CRITICAL CARE FIRST HOUR: CPT | Performed by: INTERNAL MEDICINE

## 2019-01-01 PROCEDURE — 85025 COMPLETE CBC W/AUTO DIFF WBC: CPT | Performed by: EMERGENCY MEDICINE

## 2019-01-01 PROCEDURE — 30233N1 TRANSFUSION OF NONAUTOLOGOUS RED BLOOD CELLS INTO PERIPHERAL VEIN, PERCUTANEOUS APPROACH: ICD-10-PCS | Performed by: HOSPITALIST

## 2019-01-01 PROCEDURE — 96365 THER/PROPH/DIAG IV INF INIT: CPT

## 2019-01-01 PROCEDURE — B548ZZA ULTRASONOGRAPHY OF SUPERIOR VENA CAVA, GUIDANCE: ICD-10-PCS | Performed by: HOSPITALIST

## 2019-01-01 PROCEDURE — 95813 EEG EXTND MNTR 61-119 MIN: CPT | Performed by: OTHER

## 2019-01-01 PROCEDURE — 82962 GLUCOSE BLOOD TEST: CPT

## 2019-01-01 PROCEDURE — 1111F DSCHRG MED/CURRENT MED MERGE: CPT

## 2019-01-01 PROCEDURE — 1111F DSCHRG MED/CURRENT MED MERGE: CPT | Performed by: FAMILY MEDICINE

## 2019-01-01 PROCEDURE — 96375 TX/PRO/DX INJ NEW DRUG ADDON: CPT

## 2019-01-01 PROCEDURE — 90792 PSYCH DIAG EVAL W/MED SRVCS: CPT | Performed by: OTHER

## 2019-01-01 PROCEDURE — 99291 CRITICAL CARE FIRST HOUR: CPT | Performed by: NURSE PRACTITIONER

## 2019-01-01 PROCEDURE — 99232 SBSQ HOSP IP/OBS MODERATE 35: CPT | Performed by: INTERNAL MEDICINE

## 2019-01-01 PROCEDURE — 99291 CRITICAL CARE FIRST HOUR: CPT | Performed by: HOSPITALIST

## 2019-01-01 PROCEDURE — 71046 X-RAY EXAM CHEST 2 VIEWS: CPT | Performed by: PHYSICIAN ASSISTANT

## 2019-01-01 PROCEDURE — 71045 X-RAY EXAM CHEST 1 VIEW: CPT | Performed by: NURSE PRACTITIONER

## 2019-01-01 PROCEDURE — 74177 CT ABD & PELVIS W/CONTRAST: CPT | Performed by: EMERGENCY MEDICINE

## 2019-01-01 PROCEDURE — 0DJ08ZZ INSPECTION OF UPPER INTESTINAL TRACT, VIA NATURAL OR ARTIFICIAL OPENING ENDOSCOPIC: ICD-10-PCS | Performed by: INTERNAL MEDICINE

## 2019-01-01 PROCEDURE — 74176 CT ABD & PELVIS W/O CONTRAST: CPT | Performed by: HOSPITALIST

## 2019-01-01 PROCEDURE — 84450 TRANSFERASE (AST) (SGOT): CPT | Performed by: EMERGENCY MEDICINE

## 2019-01-01 PROCEDURE — 83690 ASSAY OF LIPASE: CPT | Performed by: EMERGENCY MEDICINE

## 2019-01-01 PROCEDURE — 71045 X-RAY EXAM CHEST 1 VIEW: CPT | Performed by: EMERGENCY MEDICINE

## 2019-01-01 PROCEDURE — 84484 ASSAY OF TROPONIN QUANT: CPT | Performed by: EMERGENCY MEDICINE

## 2019-01-01 PROCEDURE — 95951 EEG MONITORING/VIDEORECORD: CPT | Performed by: OTHER

## 2019-01-01 PROCEDURE — 02HV33Z INSERTION OF INFUSION DEVICE INTO SUPERIOR VENA CAVA, PERCUTANEOUS APPROACH: ICD-10-PCS | Performed by: HOSPITALIST

## 2019-01-01 PROCEDURE — 93005 ELECTROCARDIOGRAM TRACING: CPT

## 2019-01-01 PROCEDURE — 80164 ASSAY DIPROPYLACETIC ACD TOT: CPT | Performed by: EMERGENCY MEDICINE

## 2019-01-01 PROCEDURE — 96366 THER/PROPH/DIAG IV INF ADDON: CPT

## 2019-01-01 PROCEDURE — 80320 DRUG SCREEN QUANTALCOHOLS: CPT | Performed by: EMERGENCY MEDICINE

## 2019-01-01 PROCEDURE — 5A1945Z RESPIRATORY VENTILATION, 24-96 CONSECUTIVE HOURS: ICD-10-PCS | Performed by: NURSE PRACTITIONER

## 2019-01-01 PROCEDURE — 99223 1ST HOSP IP/OBS HIGH 75: CPT | Performed by: OTHER

## 2019-01-01 PROCEDURE — 0BH18EZ INSERTION OF ENDOTRACHEAL AIRWAY INTO TRACHEA, VIA NATURAL OR ARTIFICIAL OPENING ENDOSCOPIC: ICD-10-PCS | Performed by: NURSE PRACTITIONER

## 2019-01-01 PROCEDURE — 96361 HYDRATE IV INFUSION ADD-ON: CPT

## 2019-01-01 RX ORDER — LORAZEPAM 2 MG/ML
3 INJECTION INTRAMUSCULAR
Status: DISCONTINUED | OUTPATIENT
Start: 2019-01-01 | End: 2019-01-01

## 2019-01-01 RX ORDER — LORAZEPAM 1 MG/1
2 TABLET ORAL
Status: DISCONTINUED | OUTPATIENT
Start: 2019-01-01 | End: 2019-01-01

## 2019-01-01 RX ORDER — MAGNESIUM SULFATE HEPTAHYDRATE 40 MG/ML
2 INJECTION, SOLUTION INTRAVENOUS ONCE
Status: COMPLETED | OUTPATIENT
Start: 2019-01-01 | End: 2019-01-01

## 2019-01-01 RX ORDER — SERTRALINE HYDROCHLORIDE 100 MG/1
100 TABLET, FILM COATED ORAL DAILY
Qty: 30 TABLET | Refills: 1 | Status: SHIPPED | OUTPATIENT
Start: 2019-01-01 | End: 2019-01-01 | Stop reason: SDUPTHER

## 2019-01-01 RX ORDER — TRAMADOL HYDROCHLORIDE 50 MG/1
TABLET ORAL
Qty: 30 TABLET | Refills: 1 | Status: ON HOLD | OUTPATIENT
Start: 2019-01-01 | End: 2019-01-01

## 2019-01-01 RX ORDER — MELATONIN
100 DAILY
Status: DISCONTINUED | OUTPATIENT
Start: 2019-01-01 | End: 2019-01-01

## 2019-01-01 RX ORDER — TRAMADOL HYDROCHLORIDE 50 MG/1
50 TABLET ORAL DAILY PRN
Status: ON HOLD | COMMUNITY
End: 2019-01-01

## 2019-01-01 RX ORDER — DEXTROSE AND SODIUM CHLORIDE 5; .45 G/100ML; G/100ML
INJECTION, SOLUTION INTRAVENOUS ONCE
Status: COMPLETED | OUTPATIENT
Start: 2019-01-01 | End: 2019-01-01

## 2019-01-01 RX ORDER — ENOXAPARIN SODIUM 100 MG/ML
40 INJECTION SUBCUTANEOUS DAILY
Status: DISCONTINUED | OUTPATIENT
Start: 2019-01-01 | End: 2019-01-01

## 2019-01-01 RX ORDER — ONDANSETRON 4 MG/1
TABLET, FILM COATED ORAL
Qty: 60 TABLET | Refills: 0 | OUTPATIENT
Start: 2019-01-01

## 2019-01-01 RX ORDER — CHLORDIAZEPOXIDE HYDROCHLORIDE 25 MG/1
25 CAPSULE, GELATIN COATED ORAL 3 TIMES DAILY
Status: DISCONTINUED | OUTPATIENT
Start: 2019-01-01 | End: 2019-01-01

## 2019-01-01 RX ORDER — VALPROIC ACID 250 MG/1
CAPSULE, LIQUID FILLED ORAL 4 TIMES DAILY
COMMUNITY

## 2019-01-01 RX ORDER — QUETIAPINE 100 MG/1
TABLET, FILM COATED ORAL
Qty: 15 TABLET | Refills: 0 | Status: SHIPPED | OUTPATIENT
Start: 2019-01-01 | End: 2019-01-01

## 2019-01-01 RX ORDER — LABETALOL HYDROCHLORIDE 5 MG/ML
10 INJECTION, SOLUTION INTRAVENOUS EVERY 4 HOURS PRN
Status: DISCONTINUED | OUTPATIENT
Start: 2019-01-01 | End: 2019-01-01

## 2019-01-01 RX ORDER — PANTOPRAZOLE SODIUM 40 MG/1
40 TABLET, DELAYED RELEASE ORAL
Status: DISCONTINUED | OUTPATIENT
Start: 2019-01-01 | End: 2019-01-01

## 2019-01-01 RX ORDER — SODIUM PHOSPHATE, DIBASIC AND SODIUM PHOSPHATE, MONOBASIC 7; 19 G/133ML; G/133ML
1 ENEMA RECTAL ONCE AS NEEDED
Status: DISCONTINUED | OUTPATIENT
Start: 2019-01-01 | End: 2019-01-01

## 2019-01-01 RX ORDER — POLYETHYLENE GLYCOL 3350 17 G/17G
17 POWDER, FOR SOLUTION ORAL DAILY PRN
Status: DISCONTINUED | OUTPATIENT
Start: 2019-01-01 | End: 2019-01-01

## 2019-01-01 RX ORDER — AMLODIPINE BESYLATE 5 MG/1
10 TABLET ORAL DAILY
Status: DISCONTINUED | OUTPATIENT
Start: 2019-01-01 | End: 2019-01-01

## 2019-01-01 RX ORDER — ACETAMINOPHEN 325 MG/1
650 TABLET ORAL EVERY 6 HOURS PRN
Status: DISCONTINUED | OUTPATIENT
Start: 2019-01-01 | End: 2019-01-01

## 2019-01-01 RX ORDER — ACETAMINOPHEN 160 MG/5ML
650 SOLUTION ORAL EVERY 6 HOURS PRN
Status: DISCONTINUED | OUTPATIENT
Start: 2019-01-01 | End: 2019-01-01

## 2019-01-01 RX ORDER — DEXTROSE AND SODIUM CHLORIDE 5; .9 G/100ML; G/100ML
INJECTION, SOLUTION INTRAVENOUS CONTINUOUS PRN
Status: DISCONTINUED | OUTPATIENT
Start: 2019-01-01 | End: 2019-01-01

## 2019-01-01 RX ORDER — DIVALPROEX SODIUM 500 MG/1
500 TABLET, EXTENDED RELEASE ORAL 2 TIMES DAILY
Status: DISCONTINUED | OUTPATIENT
Start: 2019-01-01 | End: 2019-01-01

## 2019-01-01 RX ORDER — LORAZEPAM 2 MG/ML
2 INJECTION INTRAMUSCULAR ONCE
Status: COMPLETED | OUTPATIENT
Start: 2019-01-01 | End: 2019-01-01

## 2019-01-01 RX ORDER — HYDROXYZINE HYDROCHLORIDE 25 MG/1
50 TABLET, FILM COATED ORAL 3 TIMES DAILY PRN
Status: DISCONTINUED | OUTPATIENT
Start: 2019-01-01 | End: 2019-01-01

## 2019-01-01 RX ORDER — LORAZEPAM 2 MG/ML
INJECTION INTRAMUSCULAR
Status: COMPLETED
Start: 2019-01-01 | End: 2019-01-01

## 2019-01-01 RX ORDER — ACETAMINOPHEN 500 MG
1000 TABLET ORAL 2 TIMES DAILY
COMMUNITY

## 2019-01-01 RX ORDER — FAMOTIDINE 10 MG/ML
20 INJECTION, SOLUTION INTRAVENOUS 2 TIMES DAILY
Status: DISCONTINUED | OUTPATIENT
Start: 2019-01-01 | End: 2019-01-01

## 2019-01-01 RX ORDER — LORAZEPAM 2 MG/ML
4 INJECTION INTRAMUSCULAR EVERY 10 MIN PRN
Status: DISCONTINUED | OUTPATIENT
Start: 2019-01-01 | End: 2019-01-01

## 2019-01-01 RX ORDER — DEXMEDETOMIDINE HYDROCHLORIDE 4 UG/ML
INJECTION, SOLUTION INTRAVENOUS CONTINUOUS PRN
Status: DISCONTINUED | OUTPATIENT
Start: 2019-01-01 | End: 2019-01-01

## 2019-01-01 RX ORDER — PHENYLEPHRINE HCL IN 0.9% NACL 50MG/250ML
PLASTIC BAG, INJECTION (ML) INTRAVENOUS CONTINUOUS
Status: DISCONTINUED | OUTPATIENT
Start: 2019-01-01 | End: 2019-01-01

## 2019-01-01 RX ORDER — QUETIAPINE 100 MG/1
100 TABLET, FILM COATED ORAL NIGHTLY
Qty: 30 TABLET | Refills: 0 | Status: SHIPPED | OUTPATIENT
Start: 2019-01-01 | End: 2019-01-01

## 2019-01-01 RX ORDER — LORAZEPAM 2 MG/ML
1 INJECTION INTRAMUSCULAR EVERY 30 MIN PRN
Status: DISCONTINUED | OUTPATIENT
Start: 2019-01-01 | End: 2019-01-01

## 2019-01-01 RX ORDER — CLOTRIMAZOLE 1 %
1 CREAM (GRAM) TOPICAL 2 TIMES DAILY
Qty: 1 TUBE | Refills: 0 | Status: SHIPPED | OUTPATIENT
Start: 2019-01-01 | End: 2019-01-01

## 2019-01-01 RX ORDER — TRAMADOL HYDROCHLORIDE 50 MG/1
TABLET ORAL
Qty: 30 TABLET | Refills: 0 | OUTPATIENT
Start: 2019-01-01

## 2019-01-01 RX ORDER — ENALAPRIL MALEATE 10 MG/1
20 TABLET ORAL DAILY
Status: DISCONTINUED | OUTPATIENT
Start: 2019-01-01 | End: 2019-01-01

## 2019-01-01 RX ORDER — DEXTROSE MONOHYDRATE 25 G/50ML
50 INJECTION, SOLUTION INTRAVENOUS
Status: DISCONTINUED | OUTPATIENT
Start: 2019-01-01 | End: 2019-01-01

## 2019-01-01 RX ORDER — QUETIAPINE 100 MG/1
100 TABLET, FILM COATED ORAL NIGHTLY
Qty: 30 TABLET | Refills: 1 | Status: SHIPPED | OUTPATIENT
Start: 2019-01-01

## 2019-01-01 RX ORDER — MAGNESIUM OXIDE 400 MG (241.3 MG MAGNESIUM) TABLET
400 TABLET ONCE
Status: COMPLETED | OUTPATIENT
Start: 2019-01-01 | End: 2019-01-01

## 2019-01-01 RX ORDER — CHLORHEXIDINE GLUCONATE 0.12 MG/ML
15 RINSE ORAL
Status: DISCONTINUED | OUTPATIENT
Start: 2019-01-01 | End: 2019-01-01

## 2019-01-01 RX ORDER — SODIUM CHLORIDE 9 MG/ML
INJECTION, SOLUTION INTRAVENOUS CONTINUOUS
Status: ACTIVE | OUTPATIENT
Start: 2019-01-01 | End: 2019-01-01

## 2019-01-01 RX ORDER — FERROUS SULFATE 325(65) MG
325 TABLET ORAL
Qty: 90 TABLET | Refills: 1 | Status: SHIPPED | OUTPATIENT
Start: 2019-01-01 | End: 2019-01-01

## 2019-01-01 RX ORDER — POTASSIUM CHLORIDE 20 MEQ/1
40 TABLET, EXTENDED RELEASE ORAL EVERY 4 HOURS
Status: COMPLETED | OUTPATIENT
Start: 2019-01-01 | End: 2019-01-01

## 2019-01-01 RX ORDER — POTASSIUM CHLORIDE 20 MEQ/1
40 TABLET, EXTENDED RELEASE ORAL ONCE
Status: COMPLETED | OUTPATIENT
Start: 2019-01-01 | End: 2019-01-01

## 2019-01-01 RX ORDER — MULTIPLE VITAMINS W/ MINERALS TAB 9MG-400MCG
1 TAB ORAL DAILY
Status: DISCONTINUED | OUTPATIENT
Start: 2019-01-01 | End: 2019-01-01

## 2019-01-01 RX ORDER — LORAZEPAM 0.5 MG/1
TABLET ORAL
Qty: 30 TABLET | Refills: 0 | OUTPATIENT
Start: 2019-01-01

## 2019-01-01 RX ORDER — DEXTROSE MONOHYDRATE 25 G/50ML
50 INJECTION, SOLUTION INTRAVENOUS ONCE
Status: COMPLETED | OUTPATIENT
Start: 2019-01-01 | End: 2019-01-01

## 2019-01-01 RX ORDER — DEXTROSE, SODIUM CHLORIDE, AND POTASSIUM CHLORIDE 5; .9; .15 G/100ML; G/100ML; G/100ML
INJECTION INTRAVENOUS CONTINUOUS
Status: DISCONTINUED | OUTPATIENT
Start: 2019-01-01 | End: 2019-01-01

## 2019-01-01 RX ORDER — FOLIC ACID 1 MG/1
1 TABLET ORAL DAILY
Status: DISCONTINUED | OUTPATIENT
Start: 2019-01-01 | End: 2019-01-01

## 2019-01-01 RX ORDER — INSULIN LISPRO 100 [IU]/ML
6 INJECTION, SOLUTION INTRAVENOUS; SUBCUTANEOUS 3 TIMES DAILY
Status: SHIPPED | COMMUNITY
Start: 2019-01-01

## 2019-01-01 RX ORDER — SODIUM CHLORIDE 9 MG/ML
INJECTION, SOLUTION INTRAVENOUS ONCE
Status: COMPLETED | OUTPATIENT
Start: 2019-01-01 | End: 2019-01-01

## 2019-01-01 RX ORDER — TRAMADOL HYDROCHLORIDE 50 MG/1
50 TABLET ORAL 2 TIMES DAILY
Qty: 60 TABLET | Refills: 0 | Status: SHIPPED | OUTPATIENT
Start: 2019-01-01 | End: 2019-01-01 | Stop reason: CLARIF

## 2019-01-01 RX ORDER — PHENYLEPHRINE HCL IN 0.9% NACL 50MG/250ML
PLASTIC BAG, INJECTION (ML) INTRAVENOUS
Status: DISCONTINUED
Start: 2019-01-01 | End: 2019-01-01 | Stop reason: SDUPTHER

## 2019-01-01 RX ORDER — DEXTROSE MONOHYDRATE 25 G/50ML
INJECTION, SOLUTION INTRAVENOUS
Status: DISPENSED
Start: 2019-01-01 | End: 2019-01-01

## 2019-01-01 RX ORDER — SODIUM CHLORIDE 9 MG/ML
INJECTION, SOLUTION INTRAVENOUS CONTINUOUS
Status: DISCONTINUED | OUTPATIENT
Start: 2019-01-01 | End: 2019-01-01

## 2019-01-01 RX ORDER — LORAZEPAM 2 MG/ML
INJECTION INTRAMUSCULAR
Status: DISCONTINUED
Start: 2019-01-01 | End: 2019-01-01

## 2019-01-01 RX ORDER — METOCLOPRAMIDE HYDROCHLORIDE 5 MG/ML
10 INJECTION INTRAMUSCULAR; INTRAVENOUS EVERY 6 HOURS PRN
Status: DISCONTINUED | OUTPATIENT
Start: 2019-01-01 | End: 2019-01-01

## 2019-01-01 RX ORDER — ONDANSETRON 2 MG/ML
8 INJECTION INTRAMUSCULAR; INTRAVENOUS EVERY 6 HOURS PRN
Status: DISCONTINUED | OUTPATIENT
Start: 2019-01-01 | End: 2019-01-01

## 2019-01-01 RX ORDER — TRAMADOL HYDROCHLORIDE 50 MG/1
50 TABLET ORAL EVERY 6 HOURS PRN
Status: DISCONTINUED | OUTPATIENT
Start: 2019-01-01 | End: 2019-01-01

## 2019-01-01 RX ORDER — LORAZEPAM 2 MG/ML
2 INJECTION INTRAMUSCULAR
Status: DISCONTINUED | OUTPATIENT
Start: 2019-01-01 | End: 2019-01-01

## 2019-01-01 RX ORDER — MAGNESIUM OXIDE 400 MG (241.3 MG MAGNESIUM) TABLET
3 TABLET NIGHTLY PRN
Status: DISCONTINUED | OUTPATIENT
Start: 2019-01-01 | End: 2019-01-01

## 2019-01-01 RX ORDER — CHLORDIAZEPOXIDE HYDROCHLORIDE 10 MG/1
10 CAPSULE, GELATIN COATED ORAL 4 TIMES DAILY PRN
Status: DISCONTINUED | OUTPATIENT
Start: 2019-01-01 | End: 2019-01-01

## 2019-01-01 RX ORDER — LORAZEPAM 2 MG/ML
4 INJECTION INTRAMUSCULAR
Status: DISCONTINUED | OUTPATIENT
Start: 2019-01-01 | End: 2019-01-01

## 2019-01-01 RX ORDER — HYDROMORPHONE HYDROCHLORIDE 1 MG/ML
0.2 INJECTION, SOLUTION INTRAMUSCULAR; INTRAVENOUS; SUBCUTANEOUS EVERY 2 HOUR PRN
Status: DISCONTINUED | OUTPATIENT
Start: 2019-01-01 | End: 2019-01-01

## 2019-01-01 RX ORDER — QUETIAPINE 100 MG/1
100 TABLET, FILM COATED ORAL NIGHTLY
Status: DISCONTINUED | OUTPATIENT
Start: 2019-01-01 | End: 2019-01-01

## 2019-01-01 RX ORDER — LORAZEPAM 0.5 MG/1
0.5 TABLET ORAL 2 TIMES DAILY PRN
Qty: 15 TABLET | Refills: 0 | Status: SHIPPED | OUTPATIENT
Start: 2019-01-01 | End: 2019-01-01

## 2019-01-01 RX ORDER — DEXTROSE MONOHYDRATE 25 G/50ML
INJECTION, SOLUTION INTRAVENOUS
Status: COMPLETED
Start: 2019-01-01 | End: 2019-01-01

## 2019-01-01 RX ORDER — QUETIAPINE 100 MG/1
TABLET, FILM COATED ORAL
Qty: 15 TABLET | Refills: 0 | Status: SHIPPED | OUTPATIENT
Start: 2019-01-01

## 2019-01-01 RX ORDER — PEN NEEDLE, DIABETIC 31 GX3/16"
NEEDLE, DISPOSABLE MISCELLANEOUS
Qty: 1 BOX | Refills: 0 | Status: SHIPPED | OUTPATIENT
Start: 2019-01-01

## 2019-01-01 RX ORDER — NALOXONE HYDROCHLORIDE 0.4 MG/ML
80 INJECTION, SOLUTION INTRAMUSCULAR; INTRAVENOUS; SUBCUTANEOUS AS NEEDED
Status: DISCONTINUED | OUTPATIENT
Start: 2019-01-01 | End: 2019-01-01 | Stop reason: HOSPADM

## 2019-01-01 RX ORDER — LABETALOL HYDROCHLORIDE 5 MG/ML
20 INJECTION, SOLUTION INTRAVENOUS ONCE
Status: COMPLETED | OUTPATIENT
Start: 2019-01-01 | End: 2019-01-01

## 2019-01-01 RX ORDER — TRAMADOL HYDROCHLORIDE 50 MG/1
TABLET ORAL
Qty: 20 TABLET | Refills: 0 | Status: SHIPPED | OUTPATIENT
Start: 2019-01-01 | End: 2019-01-01

## 2019-01-01 RX ORDER — HYDROMORPHONE HYDROCHLORIDE 1 MG/ML
1 INJECTION, SOLUTION INTRAMUSCULAR; INTRAVENOUS; SUBCUTANEOUS EVERY 2 HOUR PRN
Status: DISCONTINUED | OUTPATIENT
Start: 2019-01-01 | End: 2019-01-01

## 2019-01-01 RX ORDER — FERROUS SULFATE 325(65) MG
325 TABLET ORAL
Qty: 90 TABLET | Refills: 1 | Status: SHIPPED | OUTPATIENT
Start: 2019-01-01

## 2019-01-01 RX ORDER — HYDRALAZINE HYDROCHLORIDE 20 MG/ML
INJECTION INTRAMUSCULAR; INTRAVENOUS
Status: DISPENSED
Start: 2019-01-01 | End: 2019-01-01

## 2019-01-01 RX ORDER — HYDROMORPHONE HYDROCHLORIDE 1 MG/ML
1 INJECTION, SOLUTION INTRAMUSCULAR; INTRAVENOUS; SUBCUTANEOUS EVERY 30 MIN PRN
Status: DISCONTINUED | OUTPATIENT
Start: 2019-01-01 | End: 2019-01-01 | Stop reason: HOSPADM

## 2019-01-01 RX ORDER — ALBUMIN, HUMAN INJ 5% 5 %
250 SOLUTION INTRAVENOUS ONCE
Status: COMPLETED | OUTPATIENT
Start: 2019-01-01 | End: 2019-01-01

## 2019-01-01 RX ORDER — SERTRALINE HYDROCHLORIDE 100 MG/1
TABLET, FILM COATED ORAL
Qty: 30 TABLET | Refills: 0 | OUTPATIENT
Start: 2019-01-01

## 2019-01-01 RX ORDER — LORAZEPAM 2 MG/ML
2 INJECTION INTRAMUSCULAR EVERY 30 MIN PRN
Status: DISCONTINUED | OUTPATIENT
Start: 2019-01-01 | End: 2019-01-01

## 2019-01-01 RX ORDER — TRAZODONE HYDROCHLORIDE 100 MG/1
50 TABLET ORAL NIGHTLY PRN
Status: DISCONTINUED | OUTPATIENT
Start: 2019-01-01 | End: 2019-01-01

## 2019-01-01 RX ORDER — SODIUM CHLORIDE 0.9 % (FLUSH) 0.9 %
10 SYRINGE (ML) INJECTION AS NEEDED
Status: DISCONTINUED | OUTPATIENT
Start: 2019-01-01 | End: 2019-01-01

## 2019-01-01 RX ORDER — ONDANSETRON 2 MG/ML
4 INJECTION INTRAMUSCULAR; INTRAVENOUS EVERY 6 HOURS PRN
Status: DISCONTINUED | OUTPATIENT
Start: 2019-01-01 | End: 2019-01-01

## 2019-01-01 RX ORDER — AMLODIPINE BESYLATE 10 MG/1
10 TABLET ORAL DAILY
Qty: 30 TABLET | Refills: 1 | Status: SHIPPED | OUTPATIENT
Start: 2019-01-01

## 2019-01-01 RX ORDER — CALCIUM CHLORIDE 100 MG/ML
INJECTION INTRAVENOUS; INTRAVENTRICULAR
Status: DISCONTINUED
Start: 2019-01-01 | End: 2019-01-01

## 2019-01-01 RX ORDER — ENOXAPARIN SODIUM 100 MG/ML
40 INJECTION SUBCUTANEOUS NIGHTLY
Status: DISCONTINUED | OUTPATIENT
Start: 2019-01-01 | End: 2019-01-01

## 2019-01-01 RX ORDER — FAMOTIDINE 10 MG/ML
20 INJECTION, SOLUTION INTRAVENOUS ONCE
Status: COMPLETED | OUTPATIENT
Start: 2019-01-01 | End: 2019-01-01

## 2019-01-01 RX ORDER — ACETAMINOPHEN 325 MG/1
325 TABLET ORAL EVERY 6 HOURS PRN
Status: DISCONTINUED | OUTPATIENT
Start: 2019-01-01 | End: 2019-01-01

## 2019-01-01 RX ORDER — LORAZEPAM 2 MG/ML
0.5 INJECTION INTRAMUSCULAR ONCE
Status: COMPLETED | OUTPATIENT
Start: 2019-01-01 | End: 2019-01-01

## 2019-01-01 RX ORDER — HYDRALAZINE HYDROCHLORIDE 20 MG/ML
10 INJECTION INTRAMUSCULAR; INTRAVENOUS EVERY 6 HOURS PRN
Status: DISCONTINUED | OUTPATIENT
Start: 2019-01-01 | End: 2019-01-01

## 2019-01-01 RX ORDER — SODIUM CHLORIDE, SODIUM LACTATE, POTASSIUM CHLORIDE, CALCIUM CHLORIDE 600; 310; 30; 20 MG/100ML; MG/100ML; MG/100ML; MG/100ML
INJECTION, SOLUTION INTRAVENOUS CONTINUOUS
Status: DISCONTINUED | OUTPATIENT
Start: 2019-01-01 | End: 2019-01-01

## 2019-01-01 RX ORDER — ENALAPRIL MALEATE 20 MG/1
20 TABLET ORAL DAILY
Qty: 30 TABLET | Refills: 0 | OUTPATIENT
Start: 2019-01-01

## 2019-01-01 RX ORDER — INSULIN DETEMIR 100 [IU]/ML
INJECTION, SOLUTION SUBCUTANEOUS
Status: SHIPPED | COMMUNITY
Start: 2019-01-01

## 2019-01-01 RX ORDER — ONDANSETRON 2 MG/ML
8 INJECTION INTRAMUSCULAR; INTRAVENOUS EVERY 4 HOURS PRN
Status: DISCONTINUED | OUTPATIENT
Start: 2019-01-01 | End: 2019-01-01

## 2019-01-01 RX ORDER — BISACODYL 10 MG
10 SUPPOSITORY, RECTAL RECTAL
Status: DISCONTINUED | OUTPATIENT
Start: 2019-01-01 | End: 2019-01-01

## 2019-01-01 RX ORDER — LORAZEPAM 2 MG/ML
1 INJECTION INTRAMUSCULAR ONCE
Status: COMPLETED | OUTPATIENT
Start: 2019-01-01 | End: 2019-01-01

## 2019-01-01 RX ORDER — LORAZEPAM 1 MG/1
1 TABLET ORAL
Status: DISCONTINUED | OUTPATIENT
Start: 2019-01-01 | End: 2019-01-01

## 2019-01-01 RX ORDER — FOLIC ACID 1 MG/1
1 TABLET ORAL DAILY
Qty: 90 TABLET | Refills: 1 | Status: SHIPPED | OUTPATIENT
Start: 2019-01-01

## 2019-01-01 RX ORDER — SODIUM CHLORIDE, SODIUM LACTATE, POTASSIUM CHLORIDE, CALCIUM CHLORIDE 600; 310; 30; 20 MG/100ML; MG/100ML; MG/100ML; MG/100ML
INJECTION, SOLUTION INTRAVENOUS CONTINUOUS
Status: ACTIVE | OUTPATIENT
Start: 2019-01-01 | End: 2019-01-01

## 2019-01-01 RX ORDER — MIDAZOLAM HYDROCHLORIDE 1 MG/ML
4 INJECTION INTRAMUSCULAR; INTRAVENOUS ONCE
Status: COMPLETED | OUTPATIENT
Start: 2019-01-01 | End: 2019-01-01

## 2019-01-01 RX ORDER — LEVETIRACETAM 750 MG/1
TABLET ORAL
Qty: 60 TABLET | Refills: 0 | OUTPATIENT
Start: 2019-01-01

## 2019-01-01 RX ORDER — PHENYLEPHRINE HCL IN 0.9% NACL 50MG/250ML
PLASTIC BAG, INJECTION (ML) INTRAVENOUS
Status: DISPENSED
Start: 2019-01-01 | End: 2019-01-01

## 2019-01-01 RX ORDER — METRONIDAZOLE 7.5 MG/G
GEL TOPICAL 2 TIMES DAILY
Status: DISCONTINUED | OUTPATIENT
Start: 2019-01-01 | End: 2019-01-01

## 2019-01-01 RX ORDER — MELATONIN
100 DAILY
Status: CANCELLED | OUTPATIENT
Start: 2019-01-01

## 2019-01-01 RX ORDER — ETOMIDATE 2 MG/ML
INJECTION INTRAVENOUS
Status: COMPLETED | OUTPATIENT
Start: 2019-01-01 | End: 2019-01-01

## 2019-01-01 RX ORDER — METOCLOPRAMIDE HYDROCHLORIDE 5 MG/ML
10 INJECTION INTRAMUSCULAR; INTRAVENOUS EVERY 8 HOURS PRN
Status: DISCONTINUED | OUTPATIENT
Start: 2019-01-01 | End: 2019-01-01

## 2019-01-01 RX ORDER — HYDRALAZINE HYDROCHLORIDE 20 MG/ML
10 INJECTION INTRAMUSCULAR; INTRAVENOUS
Status: DISCONTINUED | OUTPATIENT
Start: 2019-01-01 | End: 2019-01-01

## 2019-01-01 RX ORDER — SERTRALINE HYDROCHLORIDE 100 MG/1
TABLET, FILM COATED ORAL
Qty: 30 TABLET | Refills: 0 | Status: SHIPPED | OUTPATIENT
Start: 2019-01-01

## 2019-01-01 RX ORDER — ONDANSETRON 2 MG/ML
4 INJECTION INTRAMUSCULAR; INTRAVENOUS ONCE
Status: COMPLETED | OUTPATIENT
Start: 2019-01-01 | End: 2019-01-01

## 2019-01-01 RX ORDER — DEXTROSE MONOHYDRATE 25 G/50ML
50 INJECTION, SOLUTION INTRAVENOUS
Status: DISCONTINUED | OUTPATIENT
Start: 2019-01-01 | End: 2019-01-01 | Stop reason: HOSPADM

## 2019-01-01 RX ORDER — HYDROMORPHONE HYDROCHLORIDE 1 MG/ML
0.4 INJECTION, SOLUTION INTRAMUSCULAR; INTRAVENOUS; SUBCUTANEOUS EVERY 2 HOUR PRN
Status: DISCONTINUED | OUTPATIENT
Start: 2019-01-01 | End: 2019-01-01

## 2019-01-01 RX ORDER — HYDROMORPHONE HYDROCHLORIDE 1 MG/ML
0.8 INJECTION, SOLUTION INTRAMUSCULAR; INTRAVENOUS; SUBCUTANEOUS EVERY 2 HOUR PRN
Status: DISCONTINUED | OUTPATIENT
Start: 2019-01-01 | End: 2019-01-01

## 2019-01-01 RX ORDER — LORAZEPAM 2 MG/ML
3 INJECTION INTRAMUSCULAR EVERY 30 MIN PRN
Status: DISCONTINUED | OUTPATIENT
Start: 2019-01-01 | End: 2019-01-01

## 2019-01-01 RX ORDER — CLONIDINE HYDROCHLORIDE 0.2 MG/1
0.1 TABLET ORAL ONCE
Status: COMPLETED | OUTPATIENT
Start: 2019-01-01 | End: 2019-01-01

## 2019-01-01 RX ORDER — CALCIUM CHLORIDE 100 MG/ML
1 INJECTION INTRAVENOUS; INTRAVENTRICULAR ONCE
Status: COMPLETED | OUTPATIENT
Start: 2019-01-01 | End: 2019-01-01

## 2019-01-01 RX ORDER — HYDROMORPHONE HYDROCHLORIDE 1 MG/ML
0.5 INJECTION, SOLUTION INTRAMUSCULAR; INTRAVENOUS; SUBCUTANEOUS
Status: DISCONTINUED | OUTPATIENT
Start: 2019-01-01 | End: 2019-01-01

## 2019-01-01 RX ORDER — SODIUM CHLORIDE 9 MG/ML
INJECTION, SOLUTION INTRAVENOUS ONCE
Status: DISCONTINUED | OUTPATIENT
Start: 2019-01-01 | End: 2019-01-01

## 2019-01-01 RX ORDER — TRAMADOL HYDROCHLORIDE 50 MG/1
50 TABLET ORAL DAILY PRN
Status: DISCONTINUED | OUTPATIENT
Start: 2019-01-01 | End: 2019-01-01

## 2019-01-01 RX ORDER — LORAZEPAM 2 MG/ML
1 INJECTION INTRAMUSCULAR
Status: DISCONTINUED | OUTPATIENT
Start: 2019-01-01 | End: 2019-01-01

## 2019-01-01 RX ORDER — QUETIAPINE FUMARATE 100 MG/1
TABLET, FILM COATED ORAL
Qty: 30 TABLET | Refills: 0 | Status: CANCELLED | OUTPATIENT
Start: 2019-01-01

## 2019-01-01 RX ORDER — TRAMADOL HYDROCHLORIDE 50 MG/1
50 TABLET ORAL EVERY 8 HOURS PRN
Qty: 30 TABLET | Refills: 0 | Status: SHIPPED | OUTPATIENT
Start: 2019-01-01 | End: 2019-01-01

## 2019-01-01 RX ORDER — DEXTROSE MONOHYDRATE 25 G/50ML
INJECTION, SOLUTION INTRAVENOUS
Status: DISCONTINUED
Start: 2019-01-01 | End: 2019-01-01

## 2019-01-01 RX ORDER — MIDAZOLAM HYDROCHLORIDE 1 MG/ML
INJECTION INTRAMUSCULAR; INTRAVENOUS
Status: COMPLETED
Start: 2019-01-01 | End: 2019-01-01

## 2019-01-01 RX ORDER — LORAZEPAM 0.5 MG/1
0.5 TABLET ORAL 2 TIMES DAILY PRN
Qty: 30 TABLET | Refills: 1 | Status: ON HOLD | OUTPATIENT
Start: 2019-01-01 | End: 2019-01-01

## 2019-01-01 RX ORDER — ACETAMINOPHEN 650 MG/1
650 SUPPOSITORY RECTAL EVERY 6 HOURS PRN
Status: DISCONTINUED | OUTPATIENT
Start: 2019-01-01 | End: 2019-01-01

## 2019-01-01 RX ORDER — LANCETS 33 GAUGE
EACH MISCELLANEOUS
Qty: 1 BOX | Refills: 0 | Status: SHIPPED | OUTPATIENT
Start: 2019-01-01

## 2019-01-01 RX ORDER — SODIUM CHLORIDE, SODIUM LACTATE, POTASSIUM CHLORIDE, CALCIUM CHLORIDE 600; 310; 30; 20 MG/100ML; MG/100ML; MG/100ML; MG/100ML
INJECTION, SOLUTION INTRAVENOUS CONTINUOUS
Status: CANCELLED | OUTPATIENT
Start: 2019-01-01

## 2019-01-01 RX ORDER — PEN NEEDLE, DIABETIC 31 GX3/16"
NEEDLE, DISPOSABLE MISCELLANEOUS
Qty: 100 EACH | Refills: 0 | Status: SHIPPED | OUTPATIENT
Start: 2019-01-01 | End: 2019-01-01 | Stop reason: CLARIF

## 2019-01-01 RX ORDER — CLOTRIMAZOLE 1 %
CREAM (GRAM) TOPICAL 2 TIMES DAILY
Status: DISCONTINUED | OUTPATIENT
Start: 2019-01-01 | End: 2019-01-01

## 2019-01-01 RX ORDER — HYDRALAZINE HYDROCHLORIDE 20 MG/ML
10 INJECTION INTRAMUSCULAR; INTRAVENOUS EVERY 4 HOURS PRN
Status: DISCONTINUED | OUTPATIENT
Start: 2019-01-01 | End: 2019-01-01

## 2019-01-01 RX ORDER — POTASSIUM CHLORIDE 14.9 MG/ML
20 INJECTION INTRAVENOUS ONCE
Status: COMPLETED | OUTPATIENT
Start: 2019-01-01 | End: 2019-01-01

## 2019-01-01 RX ORDER — DIPHENHYDRAMINE HCL 25 MG
25 CAPSULE ORAL ONCE
Status: COMPLETED | OUTPATIENT
Start: 2019-01-01 | End: 2019-01-01

## 2019-01-01 RX ORDER — SERTRALINE HYDROCHLORIDE 100 MG/1
TABLET, FILM COATED ORAL
Qty: 30 TABLET | Refills: 0 | Status: SHIPPED | OUTPATIENT
Start: 2019-01-01 | End: 2019-01-01

## 2019-01-01 RX ORDER — LORAZEPAM 2 MG/ML
4 INJECTION INTRAMUSCULAR ONCE
Status: COMPLETED | OUTPATIENT
Start: 2019-01-01 | End: 2019-01-01

## 2019-01-01 RX ORDER — ACETAMINOPHEN 10 MG/ML
1000 INJECTION, SOLUTION INTRAVENOUS EVERY 6 HOURS PRN
Status: DISCONTINUED | OUTPATIENT
Start: 2019-01-01 | End: 2019-01-01

## 2019-01-07 NOTE — ED NOTES
Wife at bedside to transport pt home, pt a&ox4, ambulatory with steady gait, no complaints at this time
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
## 2019-01-07 NOTE — ED NOTES
Per [place] police department pt is not on police hold, no charges were filed this evening. Pt is able to return home at any time.

## 2019-01-12 PROBLEM — R56.9 SEIZURE (HCC): Status: ACTIVE | Noted: 2019-01-01

## 2019-01-12 PROBLEM — F10.929 ALCOHOLIC INTOXICATION WITH COMPLICATION (HCC): Status: ACTIVE | Noted: 2019-01-01

## 2019-01-12 NOTE — PLAN OF CARE
Assumed care of patient at 0700. Patient AOx4. Neuro checks Q4, initial examination uneventful aside from R eye continuous constriction/dialation during pupil examination. Neurology APN made aware. CIWA's Q2 for EtOH withdrawal, on the even hour.   Compl

## 2019-01-12 NOTE — PROGRESS NOTES
NURSING ADMISSION NOTE      Patient admitted via Cart  Oriented to room. Safety precautions initiated. Bed in low position. Call light in reach. Pt is still intoxicated, but aox4 and tearful - regrets drinking again and letting down his family.   IV

## 2019-01-12 NOTE — ED PROVIDER NOTES
Patient Seen in: [facility] Emergency Department    History   Patient presents with:  Seizure Disorder (neurologic)    Stated Complaint: SEIZURE    HPI    Patient is a 19-year-old male with previous history of seizure disorder as well as alcoholism and vital signs reviewed. All other systems reviewed and negative except as noted above.     Physical Exam     ED Triage Vitals [01/12/19 0133]   BP (!) 158/104   Pulse 116   Resp 18   Temp 98 °F (36.7 °C)   Temp src Temporal   SpO2 95 %   O2 Device No 5.1 (*)     All other components within normal limits   MANUAL DIFFERENTIAL - Abnormal; Notable for the following components:    Lymphocyte Absolute Manual 6.00 (*)     RBC Morphology See morphology below (*)     Target Cells Moderate (*)     All other com follow-up provider specified.       Medications Prescribed:  Current Discharge Medication List        Present on Admission  Date Reviewed: 12/17/2018          ICD-10-CM Noted POA    * (Principal) Seizure ([facility] [address] R56.9 1/12/2019 Unknown    Alcoholic intoxicati

## 2019-01-12 NOTE — CONSULTS
[id number] [facility] Neurology Initial Consultation    Frances Cecilia Patient Status:  Inpatient    1970 MRN [id number]   [facility] 3NE-A Attending Rolo Acosta MD   Hosp Day # 0 PCP Reggie Leach DO     REASON FOR EVALUATION: gastrointestinal or genitourinary symptoms    PAST MEDICAL HISTORY:  Diagnosis Date   • Back problem    • Depression    • Diabetes ([facility]    • ETOH abuse    • Pancreatitis, alcoholic, acute    • Scoliosis    • Seizure disorder ([facility]    • Unspecified essenti Fumarate (SEROQUEL) tab 100 mg 100 mg Oral Nightly   Sertraline HCl (ZOLOFT) tab 100 mg 100 mg Oral Daily   TraZODone HCl (DESYREL) tab 50 mg 50 mg Oral Nightly PRN   LORazepam (ATIVAN) tab 1 mg 1 mg Oral Q1H PRN   Or      LORazepam (ATIVAN) injection 1 mg repetition and comprehension intact  Memory: normal  Attention/concentration: normal    CN: PERRL, EOMI without nystagmus, VFF, smile symmetric, sensation intact, tongue and palate midline, SCM intact; otherwise, 2-12 intact  Motor: 5/5 strength throughout significantly subtherapeutic Depakote level and admits to binge drinking and medication non compliance - at risk for withdrawal seizures     PLAN:  Neuro checks q 4  Maintain seizure precautions  Currently on Depakote 500 mg BID  CIWA for withdrawal manage

## 2019-01-12 NOTE — PROGRESS NOTES
IM addendum to Dr. Moore Mins H&P:    Pt seen and examined, agree with above. No acute events, c/o nausea this morning.   CIWA scores around 5  Did not want to talk to psych liason    BP (!) 163/114   Pulse 74   Temp 98.6 °F (37 °C) (Oral)   Resp 16   Ht 5

## 2019-01-12 NOTE — H&P
DEVENDRA HOSPITALIST  History and Physical     [place] Patient Status:  Emergency    1970 MRN [id number]   Location [address] [facility] Attending Ann Stafford MD   Hosp Day # 0 PCP Caroline Uribe DO     Chief Complain Itching, dizziness  Phenytoin               ITCHING    Comment:irriatibility    Medications:    No current facility-administered medications on file prior to encounter.    Current Outpatient Medications on File Prior to Encounter:  QUEtiapine Fumarate 100 M if blood sugar  >400. Max 45 units daily Disp: 15 mL Rfl: 3   Needles & Syringes Does not apply Misc Pt needs needles for basaglarPlease dispense per insurance Disp: 1 Box Rfl: 0   Glucose Blood (ONETOUCH ULTRA BLUE) In Vitro Strip Test Blood Sugar 4 to 5 t or lesions. Psychiatric: Appropriate mood and affect.       Diagnostic Data:      Labs:  Recent Labs   Lab  01/12/19 0139   WBC  12.0   HGB  11.6*   MCV  77.3*   PLT  346.0       Recent Labs   Lab  01/12/19 0139   GLU  304*   BUN  13   CREATSERUM  1.1

## 2019-01-12 NOTE — ED INITIAL ASSESSMENT (HPI)
Wife said he's been drinking- unknown amount and hasn't stopped drinking for about 1 week. History of ETOH abuse. Had seizure today.

## 2019-01-12 NOTE — PROGRESS NOTES
[id number] [facility] Neurology Preliminary Note    Viry Royal Patient Status:  Inpatient    1970 MRN [id number]   [facility] 3NE-A Attending Carmen Richardson MD   Hosp Day # 0 PCP Deysi Nance DO     REASON FOR EVALUATION:  Rodolfo Baxter genitourinary symptoms    PAST MEDICAL HISTORY:  Diagnosis Date   • Back problem    • Depression    • Diabetes ([facility] [address]    • ETOH abuse    • Pancreatitis, alcoholic, acute    • Scoliosis    • Seizure disorder [facility])    • Unspecified essential hypertension tab 100 mg 100 mg Oral Nightly   Sertraline HCl (ZOLOFT) tab 100 mg 100 mg Oral Daily   TraZODone HCl (DESYREL) tab 50 mg 50 mg Oral Nightly PRN   LORazepam (ATIVAN) tab 1 mg 1 mg Oral Q1H PRN   Or      LORazepam (ATIVAN) injection 1 mg 1 mg Intravenous Q1 quarters in a dollar, slower to compute 12 quarters in 3 dollars. Able to state the days of the week backwards. Pupils equally round and reactive to light. 4+ brisk bilaterally. EOMs intact. Visual fields are full.     Face is symmetrical.   Tongue is significantly elevated ETOH level as well a significantly subtherapeutic Depakote level and admits to binge drinking and medication non compliance. His exam is for the most part unremarkable aside from slower complex thinking.      PLAN:  Neuro checks q 4

## 2019-01-13 NOTE — CM/SW NOTE
Order to [facility] for PHQ-4, however order for Psych Liaison. MSW will defer to psych Liaison consult. Will remain available if other needs arise.

## 2019-01-13 NOTE — PROGRESS NOTES
[id number] Sandra [facility] Neurology Progress Note    Bruno Ferguson Patient Status:  Observation    1970 MRN [id number]   [facility] 3NE-A Attending Viridiana Mata MD   Hosp Day # 0 PCP Belinda Manjarrez DO     Subjective:  Noris Munoz seen independently, reviewed history, labs and imaging independent of NP and agree with above note with following additions:   S: no acute issues overnight, less tremulous today; no seizures; patient denies hallucinations or episodes of loss of awareness, 01/12/2019    K 4.0 12/05/2018    K 4.2 12/04/2018     Lab Results   Component Value Date    BUN 13 01/12/2019    BUN 4 (L) 12/05/2018    BUN 3 (L) 12/04/2018     Lab Results   Component Value Date    CREATSERUM 1.15 01/12/2019    CREATSERUM 0.78 12/05/201    PLAN:  Neuro checks q 4  Maintain seizure precautions  Currently on Depakote 500 mg BID - check level in AM   CIWA for withdrawal management  MVI, Folic acid, Thiamine replacement for underlying ETOH abuse  Currently on Zoloft and Seroquel for behavio

## 2019-01-13 NOTE — PROGRESS NOTES
DEVENDRA HOSPITALIST  Progress Note     Ori Barbshyanne Patient Status:  Observation    1970 MRN [id number]   [facility] 3NE-A Attending Bharat Barrios MD   Hosp Day # 0 PCP Dinorah Nickerson DO     Chief Complaint: Withdrawal    S: Patie Daily   • Insulin Aspart Pen  1-5 Units Subcutaneous TID CC and HS   • Insulin Aspart Pen  1-68 Units Subcutaneous TID CC   • Thiamine HCl  100 mg Oral Daily   • multivitamin/thiamine/folic acid infusion   Intravenous Daily   • AmLODIPine Besylate  10 mg O

## 2019-01-13 NOTE — PLAN OF CARE
ANXIETY    • Will report anxiety at manageable levels Progressing        DISCHARGE PLANNING    • Discharge to home or other facility with appropriate resources Progressing        DRUG ABUSE/DETOX    • Will have no detox symptoms and will verbalize plan for

## 2019-01-13 NOTE — BH LEVEL OF CARE ASSESSMENT
Level of Care Assessment Note    General Questions  Why are you here?: I have a drinking problem, I allow myself to indulge and party over the holidays. Precipitating Events: Holidays are a trigger, social gatherings with kids.    History of Present Illne dying by suicide: Frightening  Protective Factors: patient has a positive outlook about the future, kids and family  Past Suicidal Ideation: Ideation  Describe: going to closet and disappear, teased a lot   Family History or Personal Lived Experience of Lo insomnia)  Use of Sleep Aids: drinking alcohol, melatonin  Appetite Symptoms: Decreased  Unplanned Weight Loss: Yes (comment)(never a big eater, but lost a lot of weight after spleen surgery, hard to eat a whole meal)  Unplanned Weight Gain: No  History of Support for Recovery  Is your living environment a supportive place for recovery?: Yes  Describe: wife     Withdrawal Symptoms  History of Withdrawal Symptoms: Nausea; Visual hallucinations;Tremors;Runny nose;Seizures;Sweating;Diarrhea;El Volume: Ordinary  Clarity: Clear  Cognition  Concentration: Unimpaired  Memory: Recent memory impaired;Remote memory intact  Orientation Level: Oriented X4  Insight: Fair  Fair/poor insight as evidenced by: frequent intoxication  Judgment: Fair  Fair/poor Deferred  Personality Disorders: Deferred  Pertinent Non-psychiatric Diagnoses: cirrhosis    Sign-In  Expected Discharge Date: 01/15/19

## 2019-01-14 NOTE — PAYOR COMM NOTE
--------------  ADMISSION REVIEW     Payor: Obie Tinsley #:  [id number]  Authorization Number: [id number]    Admit date: 1/12/19  Admit time: [address]       Admitting Physician: Eddy Joy MD  Attending Physician: ENDOSCOPY   • EXPLORATORY LAPAROTOMY N/A 3/25/2018    Performed by Angle Huntley MD at [facility]    lengthened right leg   • SPLENECTOMY     • VASECTOMY  2002     Review of Systems  Positive for stated complaint: [facility] A/G Ratio 0.6 (*)     All other components within normal limits   VALPROIC ACID, (DEPAKENE) - Abnormal; Notable for the following components:    Valproic Acid 5.1 (*)     All other components within normal limits   MANUAL DIFFERENTIAL - Abnormal; Notabl Author:  Yumi Ngo MD Service:  Juanjo Garcia Author Type:  Physician    Filed:  1/12/2019  2:42 AM Date of Service:  1/12/2019  2:36 AM Status:  Addendum    :   Yumi Ngo MD (Physician)    Related Notes:  Original Note by Yumi Ngo MD ([facility] AFTERNOON AND EVENING Disp: 60 tablet Rfl: 2   TraMADol HCl 50 MG Oral Tab TAKE ONE TABLET BY MOUTH EVERY 12 HOURS AS NEEDED FOR PAIN Disp: 60 tablet Rfl: 0   Ondansetron HCl (ZOFRAN) 4 mg tablet TAKE 1 TABLET(4 MG) BY MOUTH EVERY 8 HOURS AS NEEDED FOR ERENDIRA 158/104   Pulse 116   Temp 98 °F (36.7 °C) (Temporal)   Resp 18   Ht 5' 7\" (1.702 m)   Wt 155 lb (70.3 kg)   SpO2 95%   BMI 24.28 kg/m²    General: slurrying words heavily and not very coherent at this time  HEENT: Normocephalic atraumatic.  Moist mucous m report some bilateral blurry vision today     PLAN:  Neuro checks q 4  Maintain seizure precautions  Currently on Depakote 500 mg BID  CIWA for withdrawal management  MVI, Folic acid, Thiamine replacement  Currently on Zoloft and Seroquel for behavior karol Subcutaneous Q12H   metoprolol Tartrate (LOPRESSOR) tab 50 mg 50 mg Oral 2x Daily(Beta Blocker)   Pantoprazole Sodium (PROTONIX) EC tab 40 mg 40 mg Oral QAM AC   [START ON 1/13/2019] QUEtiapine Fumarate (SEROQUEL) tab 100 mg 100 mg Oral Nightly   Sertralin

## 2019-01-14 NOTE — BH PROGRESS NOTE
Talked with the pts nurse to see why another liaison order was placed. The liaison seen this pt yesterday. She said the consult was for the psychiatrist.  She was asked to place an order for Dr. Joya Hobbs.

## 2019-01-14 NOTE — PROGRESS NOTES
[id number] [facility] Progress Note    Cassandra Field Patient Status:  Inpatient    1970 MRN [id number]   [facility] 3NE-A Attending Radha Beverly MD   Hosp Day # 2 PCP Sara Hackett DO         Neurology Attending note recent neuro studies    Labs:  Depakote levels:  · 5.1 on 1/12  · 31.7 on 1/13  · 55.5 today (1/14)  HgbA1c 10.3  Drug Screen neg   EtOH 464 in ED      Assessment:   Hx of seizure disorder, non-compliant with home Depakote  Hx of ETOH abuse  DM, uncontroll

## 2019-01-14 NOTE — CONSULTS
[facility]  Report of Psychiatric Consultation    [facility] Patient Status:  Inpatient    1970 MRN [id number]   [facility] 3NE-A Attending Manuel Martinez MD   [facility] Day # 2 PCP Rosario Rebolledo DO     Date of Admission:  for sleep, grandiose thoughts. Substance Use History:  1) Severe alcohol use disorder- he has been drinking since his 20's, but daily the past 10 yrs. He drinks 1 pint to 1/5 gallon of whiskey or rum daily.  His longest period sober the past 20 yrs was Comment:irriatibility  Morphine                RASH  Norco [Hydrocodone-*    ITCHING    Comment:Itching, dizziness             Itching, dizziness  Phenytoin               ITCHING    Comment:irriatibility    Medications:    Current Facility-Administered Me mg, 100 mg, Oral, Daily  •  INFUVITE ADULT (INFUVITE) 10 mL, Thiamine HCl 723 mg, folic acid (FOLVITE) 1 mg in dextrose 5 % 1,000 mL infusion, , Intravenous, Daily  •  acetaminophen (TYLENOL) tab 325 mg, 325 mg, Oral, Q6H PRN  •  ondansetron HCl (ZOFRAN) i

## 2019-01-14 NOTE — PROGRESS NOTES
[facility]  Progress Note     Jake Ford Patient Status:  Observation    1970 MRN [id number]   [facility] 3NE-A Attending Jerry Knight MD   Hosp Day # 2 PCP Rupa Paulino DO     Chief Complaint: Withdrawal    S: Patie Pen  1-5 Units Subcutaneous TID CC and HS   • Insulin Aspart Pen  1-68 Units Subcutaneous TID CC   • Thiamine HCl  100 mg Oral Daily   • multivitamin/thiamine/folic acid infusion   Intravenous Daily   • AmLODIPine Besylate  10 mg Oral Daily       ASSESSMEN

## 2019-01-15 NOTE — CONSULTS
[facility]                       Gastroenterology [address] [facility] Gastroenterology    [facility] Patient Status:  Inpatient    1970 MRN [id number]   [facility] 3NE-A Attending Latrice Hernandez MD   Hosp Day # 3 PCP Sergo pain.    PMHx:   Past Medical History:   Diagnosis Date   • Back problem    • Depression    • Diabetes ([facility] [address]    • ETOH abuse    • High blood pressure    • Pancreatitis, alcoholic, acute    • Scoliosis    • Seizure disorder (HCC)    • Unspecified essential h injection 2 mg 2 mg Intravenous Q1H PRN   glucose (DEX4) oral liquid 15 g 15 g Oral Q15 Min PRN   Or      Glucose-Vitamin C (DEX-4) 4-6 GM-MG chewable tab 4 tablet 4 tablet Oral Q15 Min PRN   Or      dextrose 50 % injection 50 mL 50 mL Intravenous Q15 Min + HTN            Respiratory: No shortness of breath, asthma, copd, recurrent pneumonia            Hematologic: The patient reports no easy bruising, frequent gum bleeding or nose bleeding;   The patient has no history of known chronic anemia            Jassi HCT  36.5*   MCV  77.3*   MCH  24.6*   MCHC  31.8   RDW  19.4*   NEPRELIM  4.88   WBC  12.0   PLT  346.0       Recent Labs   Lab  01/12/19   0139   ALT  32   AST  63*       Imaging:   PROCEDURE:  CT ABDOMEN+PELVIS (SEN=26915)     Exam limited withoutCOMP now measures 8 x 5 mm which is along the posterior aspect of the tail of pancreas. Persistent soft   tissue density mass along the mid body of the pancreas is stable measuring approximately 1.7 x 1.3 cm. SPLEEN:  There has been previous splenectomy .   Guillermo Calle vasectomy. BONES:  No acute osseous abnormality. LUNG BASES:  Interval decrease in the left pleural effusion. There is a tiny left effusion remaining. OTHER:  Negative.       =====  CONCLUSION:    1.   There is worsening colonic inflammation involving t [employer]) NRDR ([address]) which includes the Dose Index Registry. PATIENT STATED HISTORY:(As transcribed by Technologist)  Patient states she has right and left abdominal pain.       CONTRAST USED:  86cc of Omnipaque enlargement. AORTA/VASCULAR:  Mild to moderate atherosclerotic plaque identified throughout the abdominal aorta and iliac arteries. No evidence for aortic aneurysm.   RETROPERITONEUM:  There is a soft tissue density mass measuring 1.7 x 1.5 cm abutting pseudocyst cannot be excluded. Clinical correlation is necessary. Sampling or drainage of these may be amenable by a transgastric endoscopic route. 4.  New left pleural effusion and left basilar airspace disease which is likely related to atelectasis. Pain management per PCP recommendations   8. Antiemetics as needed  9. Would consider EUS under MAC once acute inflammation resolves to assess pancreatic mass    Thank you for the consultation, we will follow the patient with you.   Chiqui Booth, APRN  91

## 2019-01-15 NOTE — PROGRESS NOTES
[facility]  Progress Note     Ori Barbshyanne Patient Status:  Observation    1970 MRN [id number]   [facility] 3NE-A Attending Bharat Barrios MD   Hosp Day # 3 PCP Dinorah Nickerson DO     Chief Complaint: Withdrawal    S: Patie Nightly   • Sertraline HCl  100 mg Oral Daily   • Insulin Aspart Pen  1-5 Units Subcutaneous TID CC and HS   • Insulin Aspart Pen  1-68 Units Subcutaneous TID CC   • Thiamine HCl  100 mg Oral Daily   • multivitamin/thiamine/folic acid infusion   Intravenou

## 2019-01-15 NOTE — PROGRESS NOTES
Assumed care of patient at 14:00. Complaints of abdomen pain but denies need for pain medications. IV noted to be infiltrated. Attempted to restart IV by two RNs. Will pass on report to next shift. CIWA scores less than 7. Neuro checks q4.  Seizure precauti

## 2019-01-15 NOTE — PLAN OF CARE
Joe García  2/21/1970  Temp: 99.3 °F (37.4 °C)  Pulse: 86  Resp: 14  BP: 118/61    Pt alert and oriented x3. VSS. SR on tele. Tap water enema x 1 given as ordered by GI. IVF: LR at 150cc/hr. NPO except for ice chips.  Low grade fevers Reporting abdo

## 2019-01-15 NOTE — PAYOR COMM NOTE
--------------  CONTINUED STAY REVIEW    Payor: Obie Tinsley #:  [id number]  Authorization Number: [id number]    Admit date: 1/12/19  Admit time: [address]    Admitting Physician: Greg Everett MD  Attending Physician: anterior aspect of the mid body of the pancreas may reflect an enlarged lymph node or exophytic pancreatic mass. No significant change from previous. 6.  Status post splenectomy. Residual splenule noted incidentally.   7.  Geographic decreased attenuatio WITHDRAWAL AND FOUND TO HAVE ACUTE PANCREATITIS W/ NEW PANCREATIC MASS - THIS IS AN INPATIENT ADMISSION - PLEASE PROVIDE INPATIENT AUTHORIZATION. THANK YOU.

## 2019-01-16 NOTE — TELEPHONE ENCOUNTER
Latrice care coordinator [employer] [facility] advising that pt was admitted to [facility] 1/12. I said I believe hospital notifies you of patients being admitted but she said it is their protocol to let you know as well.      Pt has appt with you tomorro

## 2019-01-16 NOTE — PROGRESS NOTES
Gastroenterology Progress Note  Patient Name: Yvonne Jimenez  Chief Complaint: Abdominal pain, pancreatitis, constipation  S: The patient reports that he is feeling much better today. No n/v. His abdominal pain is much improved.   He had a good bowel His abdominal pain is much better today, and virtually gone.     Plan:   1) Full liquid diet - if tolerates, can advance to low residue for lunch  2) If tolerates low residue diet, can d/c home  3) Follow-up with Dr. Cheryl Hazel in 3-4 weeks

## 2019-01-16 NOTE — PROGRESS NOTES
[facility]  Progress Note     Viry Royal Patient Status:  Observation    1970 MRN [id number]   [facility] 3NE-A Attending Carmen Richardson MD   Hosp Day # 4 PCP Deysi Nance DO     Chief Complaint: Withdrawal    S: Patie Oral Q4H   • insulin detemir  5 Units [email or web address]   • divalproex Sodium ER  500 mg Oral BID   • Enalapril Maleate  20 mg Oral Daily   • folic acid  1 mg Oral Daily   • Metoprolol Tartrate  50 mg Oral 2x Daily(Beta Blocker)   • Pantoprazole S

## 2019-01-16 NOTE — CM/SW NOTE
Latrice ph#[phone], Care Coordinator through [facility] [address] called to advise that she can assist with any d/c needs.

## 2019-01-16 NOTE — DISCHARGE SUMMARY
[facility]  DISCHARGE SUMMARY     Caseypanda Enrique Patient Status:  Inpatient    1970 MRN [id number]   [facility] 3NE-A Attending Tony Villanueva MD   [facility] Day # 6 PCP Manish Song DO     Date of Admission: 2019  Date this  · additional instructions      Inject into skin subcutaneously 15 units twice daily   Quantity:  15 mL  Refills:  2        CONTINUE taking these medications      Instructions Prescription details   Dicyclomine HCl 10 MG Caps  Commonly known as:  BENT tablet  Refills:  1     ONETOUCH DELICA LANCETS 14N Misc      1 lancet by Finger stick route 5 x daily. Test Blood Glucose 4 to 5 times daily before meals, at bedtime and for signs or symptoms of hypoglycemia or as directed by physician.    Quantity:  1 Box Next 30 Days 1/18/2019 - 2/17/2019      Date Arrival Time Visit Type Length Department Provider     2/12/2019 10:50 AM  NEW/CONSULT [192] 10 min.  Luda Lane MD    Location Instructions:      [address]

## 2019-01-16 NOTE — PLAN OF CARE
Kal Gil  2/21/1970  Temp: 98.7 °F (37.1 °C)  Pulse: 81  Resp: 18  BP: 127/85    Pt alert and oriented x 3. VSS. Pt ate full liquid breakfast and after having cream of wheat pt felt nauseous and pain was worsened to left mid abdomen.  Similar to t

## 2019-01-17 NOTE — PLAN OF CARE
A/o x4. VSS. NSR on tele. Room air. Seizure precautions in place.  On Depakote  C/o abdominal pain; PRN pain meds administered per MAR  Full liquid dinner tolerated without complaints  Up ad fredi  QID accucheck; on insulin  IVF infusing  Plan: probably d/c

## 2019-01-17 NOTE — PROGRESS NOTES
[facility]  Progress Note     Lisa Bruno Patient Status:  Observation    1970 MRN [id number]   [facility] 3NE-A Attending Leo Romero MD   Hosp Day # 5 PCP Mary Kate Feliciano DO     Chief Complaint: Withdrawal    S: Patie hours. No results for input(s): TROP, CK in the last 168 hours. Imaging: Imaging data reviewed in Epic.     Medications:   • insulin detemir  5 Units Subcutaneous [email or web address]   • divalproex Sodium ER  500 mg Oral BID   • Enalapril Maleate  20 m

## 2019-01-17 NOTE — PROGRESS NOTES
Gastroenterology Progress Note  Patient Name: Marilin Stoddard  Chief Complaint: Abdominal pain  S: The patient reports continued abdominal pain managed with every 6-8 hrs narcotics alternating with tyl;enol. He reports that this controls his pain.

## 2019-01-17 NOTE — PLAN OF CARE
A/Ox4. Calm and cooperative. On RA. Tele-NSR. Seizure precautions maintained. C/o abd pain. Clear liquid diet only today, per GI. Dilaudid PRN  IVF infusing. Up ad fredi.   Staff will cont to monitor    ANXIETY    • Will report anxiety at manageable leve

## 2019-01-18 NOTE — PLAN OF CARE
A/Ox4. Calm and cooperative. On RA. Tele-NSR. Seizure precautions maintained. C/o abd pain. Diet advanced to lowfiber/soft. If tolerating diet, can be dc'd this afternoon. F/u with GI as outpt  Dilaudid PRN. Accucheck QID. IVF infusing. Up ad fredi.

## 2019-01-18 NOTE — PAYOR COMM NOTE
--------------  CONTINUED STAY REVIEW    Payor: Obie Tinsley #:  [id number]  Authorization Number: [id number]    Admit date: 1/12/19  Admit time: [address]    Admitting Physician: Lucía Rowe MD  Attending Physician: LAST 1 DAY:  HYDROmorphone HCl (DILAUDID) 1 MG/ML injection 0.5 mg     Date Action Dose Route User    1/18/2019 0527 Given 0.5 mg Intravenous Christianne Chua, RN    1/17/2019 1817 Given 0.5 mg Intravenous Porsche Duron, RN    1/17/2019 1233 Given 0.5 mg Intra

## 2019-01-18 NOTE — PROGRESS NOTES
Gastroenterology Progress Note  Patient Name: Binu Jones  Chief Complaint: Acute pancreatitis  S: The patient reports, \"I'm feeling much better today\". He continues to have abdominal pain, which is much improved.   He did not require narcotic catalina

## 2019-01-18 NOTE — PLAN OF CARE
Assumed care at  Doctor Rodrigo 91 oriented and awake  Wife at bedside  C/o 8/10 abdominal pain  Medication given as ordered  CLD  SZ prec maintained  Plan is to advance diet as tolerated  Discussed with patient and spouse  Call light in reach  Will monitor

## 2019-01-18 NOTE — PROGRESS NOTES
[facility]  Progress Note     Renita Julian Patient Status:  Observation    1970 MRN [id number]   [facility] 3NE-A Attending Lupillo Flor MD   Hosp Day # 6 PCP Cristel Vela DO     Chief Complaint: Withdrawal    S: Patie 105.2 mL/min (based on SCr of 0.82 mg/dL). No results for input(s): PTP, INR in the last 168 hours. No results for input(s): TROP, CK in the last 168 hours. Imaging: Imaging data reviewed in Epic.     Medications:   • insulin detemir  5 Units

## 2019-01-21 NOTE — PAYOR COMM NOTE
--------------  DISCHARGE REVIEW    Payor: Obie Tinsley #:  [id number]  Authorization Number: [id number]    Admit date: 1/12/19  Admit time:  [address]  Discharge Date: 1/18/2019  6:42 PM     Admitting Physician: Delores Alcala

## 2019-02-01 NOTE — PROGRESS NOTES
Multiple attempts to reach pt and messages left with no return call. Past 14 day timeframe. Encounter closing.

## 2019-02-06 PROBLEM — F10.930 ALCOHOL WITHDRAWAL SYNDROME WITHOUT COMPLICATION (HCC): Status: ACTIVE | Noted: 2019-01-01

## 2019-02-06 PROBLEM — E83.42 HYPOMAGNESEMIA: Status: ACTIVE | Noted: 2019-01-01

## 2019-02-06 PROBLEM — R73.9 HYPERGLYCEMIA: Status: ACTIVE | Noted: 2019-01-01

## 2019-02-06 PROBLEM — K85.20 ALCOHOL-INDUCED ACUTE PANCREATITIS WITHOUT INFECTION OR NECROSIS: Status: ACTIVE | Noted: 2019-01-01

## 2019-02-06 PROBLEM — F10.230 ALCOHOL WITHDRAWAL SYNDROME WITHOUT COMPLICATION (HCC): Status: ACTIVE | Noted: 2019-01-01

## 2019-02-06 NOTE — PROGRESS NOTES
Admission navigator completed  Patient will need seizure precautions initiated on floor.  Hx seizures  Last drink yesterday AM  Tremors noted during admission questioning

## 2019-02-06 NOTE — H&P
[facility]  History and Physical     Morris Lesser Patient Status:  Emergency    1970 MRN [id number]   Location [address] [facility] Attending Gene Rosa MD   Hosp Day # 0 PCP Cindy Gallego DO     Chief Complaint alcohol per week. He reports that he does not use drugs.     Family History:   Family History   Problem Relation Age of Onset   • Heart Attack Father 50   • Heart Disorder Father    • Thyroid Disorder Mother    • Heart Disorder Mother    • Diabetes Maternal 1 MG Oral Tab Take 1 tablet (1 mg total) by mouth daily. Disp: 30 tablet Rfl: 5   Metoprolol Tartrate 50 MG Oral Tab Take 1 tablet (50 mg total) by mouth 2 (two) times daily.  Disp: 60 tablet Rfl: 3   Insulin Lispro (ADMELOG SOLOSTAR) 100 UNIT/ML Subcutaneo Recent Labs   Lab  02/06/19   0439   GLU  355*   BUN  15   CREATSERUM  0.93   GFRAA  113   GFRNAA  98   CA  9.1   ALB  3.7   NA  131*   K  3.9   CL  94*   CO2  22.0   ALKPHO  159*   AST  26   ALT  29   BILT  0.5   TP  8.9*       Estimated Creatinine

## 2019-02-06 NOTE — ED INITIAL ASSESSMENT (HPI)
Pt presents to ed ambulatory with steady gait c/o medial chest pain at a 10/10 that began yesterday morning. Pt is a&ox4, moves all extremities well, resps easy.

## 2019-02-06 NOTE — ED PROVIDER NOTES
Patient Seen in: [facility] Emergency Department    History   Patient presents with:  Chest Pain Angina (cardiovascular)    Stated Complaint: chest pain    HPI    Patient is a 59-year-old male with a history of alcohol abuse, prior episodes of pancr Current:BP (!) 165/97   Pulse 107   Temp 97.4 °F (36.3 °C) (Temporal)   Resp 13   Ht 167.6 cm (5' 6\")   Wt 72.6 kg   SpO2 96%   BMI 25.82 kg/m²         Physical Exam   Constitutional: He is oriented to person, place, and time.  He appears well-develo HGB 10.8 (*)     HCT 33.2 (*)     .0 (*)     MCV 77.0 (*)     MCH 25.1 (*)     RDW 18.4 (*)     RDW-SD 50.8 (*)     Neutrophil Absolute Prelim 8.14 (*)     Neutrophil Absolute 8.14 (*)     All other components within normal limits   TROPONIN I - decreased attenuation in the lateral segment of the left lobe of the liver is a new finding which could reflect focal fatty change with other etiologies not excluded.   Limited without IV contrast.    Dictated by: Beryl Rosa MD on 1/14/2019 at 18:17 pancreas. Patient has had 2 doses of Dilaudid and states the pain is a little bit better although still significant. He does repeat her somewhat tremulous as well and may be having onset of alcohol withdrawal symptoms. His initial CIWA was a 5.   Will or

## 2019-02-06 NOTE — PROGRESS NOTES
NURSING ADMISSION NOTE      Patient admitted via Wheelchair  Oriented to room. Safety precautions initiated. Bed in low position. Call light in reach.     Pt admitted to unit- assumed care at 1600  VSS on room air  Medications given- sips with meds

## 2019-02-07 NOTE — PLAN OF CARE
NURSING DISCHARGE NOTE    Discharged Home via Ambulatory. Accompanied by RN  Belongings Taken by patient/family. Diet tolerated with no increase in pain or n/v. Reviewed d/c instructions and med rec with pt- verbalized understanding.  Removed PIV an

## 2019-02-07 NOTE — CONSULTS
[address]  Report of GI Consultation    Liam Armenta Patient Status:  Inpatient    1970 MRN [id number]   [facility] 3NE-A Attending Romulo Ibarra MD   Hosp Day # 0 PCP Rodolfo Suarez DO     Date of Admission:  2019  Rodríguez Father    • Thyroid Disorder Mother    • Heart Disorder Mother    • Diabetes Maternal Grandfather        Social History  Ongoing EtOH abuse  No illicits          Current Medications:    Current Facility-Administered Medications:   AmLODIPine Besylate Q12H   Insulin Aspart Pen (NOVOLOG) 100 UNIT/ML flexpen 2-10 Units 2-10 Units Subcutaneous TID CC and HS       Allergies    Dilantin                ITCHING    Comment:irriatibility  Norco [Hydrocodone-*    ITCHING    Comment:Itching, dizziness 26   ALKPHO  159*   BILT  0.5   HGB  10.8*   WBC  11.9*       Imaging:  Xr Chest Ap Portable  (cpt=71045)    Result Date: 2/6/2019  CONCLUSION:  No lobar pneumonia or overt congestive failure. Minimal atelectasis/scarring in the lower lungs.    A prelimina

## 2019-02-07 NOTE — PAYOR COMM NOTE
--------------  ADMISSION REVIEW     Payor: Obie Tinsley #:  [id number]  Authorization Number: [id number]    Admit date: 2/6/19  Admit time: 65       Admitting Physician: Marielos Pickett MD  Attending Physician:  Glenda Reeder Notable for the following components:    POC Glucose 318 (*)     All other components within normal limits   CBC W/ DIFFERENTIAL - Abnormal; Notable for the following components:    WBC 11.9 (*)     HGB 10.8 (*)     HCT 33.2 (*)     .0 (*)     MCV 7 ordered. Update at 8:10 AM.  Lipase elevated at 789, this is higher than it was during the recent admission 3 weeks ago. CT does demonstrate a slight decrease in the changes around his pancreas.   Patient has had 2 doses of Dilaudid and states the pain IV PRN  7. PAF  8. Hyponatremia, pseudohyponatremia given elevated BS        GI CONSULT     Reason for Consultation:   pancreatitis      Impression:   1. EtOH abuse (active)  2. Acute on chronic pancreatitis with mult pseudocyst  3.  Non-complaince     [place]

## 2019-02-07 NOTE — PROGRESS NOTES
IM quick note:    Pt seen and examined, c/o mild abdominal pain, no nausea, vomiting, CIWA scores low. /76 (BP Location: Left arm)   Pulse 76   Temp 98.6 °F (37 °C) (Oral)   Resp 16   Ht 5' 6\" (1.676 m)   Wt 160 lb (72.6 kg)   SpO2 100%   BMI 25.

## 2019-02-07 NOTE — BH PROGRESS NOTE
Went to see the pt and talked with him about his etoh history. The pt states he drank too much over the week-end of whiskey. He said, he wants to get treatment now because he is sick of feeling bad. He states his plan is to go to [facility] for [facility].

## 2019-02-08 NOTE — DISCHARGE SUMMARY
DEVENDRA HOSPITALIST  DISCHARGE SUMMARY     Casey Calendzeke Patient Status:  Inpatient    1970 MRN [id number]   [facility] 3NE-A Attending No att. providers found   Hosp Day # 1 PCP [place], DO     Date of Admission: 2019  Da hospital.    Procedures during hospitalization:   • none    Incidental or significant findings and recommendations (brief descriptions):  • As above    Lab/Test results pending at Discharge:   · none    Consultants:  • GI    Discharge Medication List: mL  Refills:  2     Insulin Lispro 100 UNIT/ML Sopn  Commonly known as:  ADMELOG SOLOSTAR      Inject 10 units into skin every meal. Give addl 1 unit every 50 pt >150. Call MD if blood sugar  >400. Max 45 units daily   Quantity:  15 mL  Refills:  3     [facility] 2/8/2019 - 3/10/2019      Date Arrival Time Visit Type Length Department Provider     2/12/2019 10:50 AM  NEW/CONSULT [192] 10 min.  Victorino Osorio MD    Location Instructions:      [address] [place] [address]

## 2019-02-11 NOTE — TELEPHONE ENCOUNTER
FYI:  [employer] calling to let Dr. Ramez Poole know that pt was admitted to hospital on 2/6/19 and released on 2/8/19.

## 2019-02-18 NOTE — TELEPHONE ENCOUNTER
Spoke with Patient regarding no show. Made aware of charges. Stated he will call us back to reschedule as he has to get a ride.

## 2019-03-07 PROBLEM — E11.10 DIABETIC KETOACIDOSIS WITHOUT COMA ASSOCIATED WITH TYPE 2 DIABETES MELLITUS (HCC): Status: ACTIVE | Noted: 2019-01-01

## 2019-03-07 NOTE — ED NOTES
Pt resting on stretcher see prior CIWA pt calm and cooperative. NAD resps easy nonlabored. Pt appears comfort no distress noted.

## 2019-03-07 NOTE — ED INITIAL ASSESSMENT (HPI)
Pt here stating he is going through alcohol withdrawal because he feels like it. Vomiting since midnight approximately 48 times + epigastric pain. Hx of pancreatitis.  Pt has been drinking [employer] comfort for the last 7d. 10-15 small bottles of whiskey a d

## 2019-03-07 NOTE — ED PROVIDER NOTES
Patient Seen in: [facility] Emergency Department    History   Patient presents with:  Abdomen/Flank Pain (GI/)  Eval-P (psychiatric)    Stated Complaint: abd pain, vomiting, tremors - withdrawal; hx of pancreatitis    HPI    Patient is a 52-year-o except as noted above.     Physical Exam     ED Triage Vitals [03/07/19 1255]   BP (!) 180/132   Pulse 102   Resp 15   Temp 97.8 °F (36.6 °C)   Temp src Temporal   SpO2 96 %   O2 Device None (Room air)       Current:BP (!) 181/118   Pulse 101   Temp 98.6 °F Globulin  4.8 (*)     A/G Ratio 0.8 (*)     All other components within normal limits   DRUG SCREEN 7 W/OUT CONFIRMATION, URINE - Abnormal; Notable for the following components:    Opiate Urine Presumed Positive (*)     All other components within joanie ------                     CBC W/ DIFFERENTIAL[[id number]]          Abnormal            Final result                 Please view results for these tests on the individual orders.    BETA HYDROXYBUTYRATE   DRUG SCREEN 7 W/CONFIRMATION, URINE   POTASSIUM   R Stable. Not enlarged. AORTA/VASCULAR:  Stable. Smooth tapering. Ectasia is present. No     infrarenal abdominal aortic aneurysm. RETROPERITONEUM:  Stable. No adenopathy. BOWEL/MESENTERY:  Stable. Normal bowel caliber.   No new colonic     inf 3/7/2019          ICD-10-CM Noted POA    * (Principal) Diabetic ketoacidosis without coma associated with type 2 diabetes mellitus ([facility] E11.10 3/7/2019 Yes    Alcohol dependence ([facility] F10.20 2/12/2015 Yes    Alcohol withdrawal syndrome without complicatio

## 2019-03-07 NOTE — ED NOTES
Patient is resting comfortably denies nausea resting comfortably with televison on . Refuses blankets . Will cont.  To monitor

## 2019-03-07 NOTE — ED NOTES
Pt c/o pain to [facility] . Attempted to gain blood return without success. 0.9ns 10cc flush given no redness or swelling noted pt c/o increased pain . IV removed.

## 2019-03-08 NOTE — PROGRESS NOTES
DEEVNDRA HOSPITALIST  Progress Note     [facility] Patient Status:  Inpatient    1970 MRN [id number]   [facility] 4SW-A Attending Rosemary Lucas MD   Hosp Day # 1 PCP Quang Cannon DO     Chief Complaint: Abdominal pain    S: Pratibha Ibanez (based on SCr of 0.76 mg/dL). Recent Labs   Lab  03/07/19   1311   PTP  14.4   INR  1.07       No results for input(s): TROP, CK in the last 168 hours. Imaging: Imaging data reviewed in Epic.     Medications:   • chlordiazePOXIDE HCl  25 mg Oral

## 2019-03-08 NOTE — ED NOTES
Pt to icu via stretcher . C/o generalized pain.  Stating he feels ok right now but is worried the pain will increase see prior ciwa

## 2019-03-08 NOTE — PLAN OF CARE
GASTROINTESTINAL - ADULT    • Minimal or absence of nausea and vomiting Not Progressing    • Maintains or returns to baseline bowel function Not Progressing        METABOLIC/FLUID AND ELECTROLYTES - ADULT    • Glucose maintained within prescribed range Not

## 2019-03-08 NOTE — CONSULTS
Pulmonary / Critical Care H&P/Consult       NAME: Yvonne Jimenez - ROOM: [address] [place] [address]-A - MRN: [id number] - Age: 54year old - :  1970    Date of Admission: 3/7/2019 12:36 PM  Admission Diagnosis: Alcohol withdrawal syndrome without complication ([facility] Needs      Financial resource strain: Not on file      Food insecurity:        Worry: Not on file        Inability: Not on file      Transportation needs:        Medical: Not on file        Non-medical: Not on file    Tobacco Use      Smoking status: Never Disorder Mother    • Diabetes Maternal Grandfather         Home Medications:    Outpatient Medications Marked as Taking for the 3/7/19 encounter [facility] Encounter):  insulin glargine 100 UNIT/ML Subcutaneous Solution Pen-injector Inject 10 Units into the Subcutaneous Nightly   • Insulin Aspart Pen  2-10 Units Subcutaneous TID CC and HS   • amLODIPine Besylate  10 mg Oral Daily   • divalproex Sodium ER  500 mg Oral BID   • Enalapril Maleate  20 mg Oral Daily   • folic acid  1 mg Oral Daily   • insulin detem trachea midline, no adenopathy;        thyroid:  No enlargement/tenderness/nodules; no carotid    bruit or JVD   Lungs:     Clear to auscultation bilaterally, respirations unlabored   Chest wall:    No tenderness or deformity   Heart:    Regular rate and r

## 2019-03-08 NOTE — PAYOR COMM NOTE
--------------  ADMISSION REVIEW     Payor: Obie Tinsley #:  [id number]  Authorization Number: [id number]    Admit date: 3/7/19  Admit time: 1857       Admitting Physician: Jerry Knight MD  Attending Physician:  Alec Maria Notable for the following components:    Magnesium 1.2 (*)     All other components within normal limits   PTT, ACTIVATED - Abnormal; Notable for the following components:    PTT 23.5 (*)    CBC W/ DIFFERENTIAL - Abnormal; Notable for the following compone and Physical     Chandaloraine Arce Patient Status:  Emergency    1970 MRN [id number]   Location [address] [facility] Attending Will, Wagner Velarde MD   Hosp Day # 0 PCP Monica Santiago DO     Chief Complaint: abdominal pain, nausea, trem ICU      ICU  Critical Care APRN Progress Note  History Of Present Illness:  Clari Drake is a 54year old male with PMHx significant for ETOH abuse, pancreatitis, DM, and HTN.   He has been drinking and he also has not been complaint with his medicat 2. Pancreatitis: findings are actually improved from prior on ct, lipase wnl.   - cld as tolerated. 3. Transaminitis: from etoh abuse. - improving. 4. Anemia / thrombocytopenia: both mild, likely from BM suppression from etoh. - monitor.  No indic

## 2019-03-08 NOTE — PROGRESS NOTES
ICU  Critical Care APRN Progress Note    NAME: Deysi Sandra - ROOM: 456/[facility]-A - MRN: [id number] - Age: 54year old - :1970    History Of Present Illness:  Deysi Sandra is a 54year old male with PMHx significant for ETOH abuse, pancreati HPI.    OBJECTIVE  Vitals:  BP (!) 155/117   Pulse 91   Temp 98.6 °F (37 °C) (Temporal)   Resp 20   Ht 167.6 cm (5' 6\")   Wt 161 lb (73 kg)   SpO2 94%   BMI 25.99 kg/m²              Physical Exam:    General Appearance: Alert, cooperative,abd pain, hand t medication  -Zofran    2. Hyperglycemia, noncompliant DM  -Improved on Insulin gtt and transitioned to SQ  -Q6h accu-checks    3. ETOH withdrawal  -CIWA  -Psych consult  -MVI, thiamine, folic acid    4. HTN - home BP meds  5.  Elevated liver enzymes  -Fatty

## 2019-03-08 NOTE — ED NOTES
Pt sitting up calm and cooperative stating generalized body aches. Pt playing on phone with TV on. Pt asking if he is spending the night. Pt stating once he detoxes he will go to an etoh rehab facility in [place] [id number]. Insurance does not cover [facility].  Pt would like

## 2019-03-09 NOTE — PROGRESS NOTES
Level of Care Assessment Note    General Questions  Why are you here?: Patient eating breakfast, stated he comes to the Hospital frequently because of his drinking. \"I binge drink, I was drinking for the past 7 days, my last drink was 2 days ago. \" [facility] to Means: N/A  Access to Firearm/Weapon: No  Do you have a firearm owner ID card?: No    Self Injury  History of Self Injurious Behaviors: No  Present Self-Injurious Behaviors: No    Mental Health Symptoms  Hallucination Type: Visual;Auditory  Describe Froy at first use?: 14  Route: Oral  Average amount used? : 10 - 15 small bottles of whisky  How long with this pattern of use?: \" Long time. \"  Last Use?: 2 days ago.   Is your current use the most/worst it has ever been? : No    Illicit and Prescription Drug movements: Tremors  Posture:  Other (comment)(lying on bed)  Rate of Movement: Slow  Mood and Affect  Mood or Feelings: Calm  Appropriateness of Affect: Congruent to mood  Range of Affect: Normal  Attitude toward staff: Co-operative  Speech  Rate of Speech:

## 2019-03-09 NOTE — PROGRESS NOTES
DEVENDRA HOSPITALIST  Progress Note     Viry Royal Patient Status:  Inpatient    1970 MRN [id number]   [facility] 4SW-A Attending Carmen Richardson MD   Hosp Day # 2 PCP Deysi Nance DO     Chief Complaint: Abdominal pain    S: Adelia King AST  246*   --   150*  96*   ALT  97*   --   72*  56   BILT  0.8   --   0.5  0.3   TP  8.6*   --   6.8  6.1*       Estimated Creatinine Clearance: 171.7 mL/min (A) (based on SCr of 0.48 mg/dL (L)).     Recent Labs   Lab  03/07/19   1311   PTP  14.4   INR

## 2019-03-09 NOTE — PLAN OF CARE
CARDIOVASCULAR - ADULT     • Maintains optimal cardiac output and hemodynamic stability Progressing           GASTROINTESTINAL - ADULT     • Minimal or absence of nausea and vomiting Progressing     • Maintains or returns to baseline bowel function Progres

## 2019-03-09 NOTE — PROGRESS NOTES
Received pt drowsy, oriented x 3. VSS. SR noted on monitor. CIWA protocol for ETOH withdrawal. Pt stated that he smelled popcorn burning.  Per wife, pt usually has seizure activity shortly after he starts smelling something burning but can avoid seizure if

## 2019-03-10 NOTE — PROGRESS NOTES
DEVENDRA HOSPITALIST  Progress Note     Sistabatha Carlisle Patient Status:  Inpatient    1970 MRN [id number]   [facility] 4SW-A Attending Christian Aguilar MD   Hosp Day # 3 PCP Ahmad Aase, DO     Chief Complaint: Abdominal pain    S: Dorothea Perkins ALKPHO  316*   --   248*  189*   --    AST  246*   --   150*  96*   --    ALT  97*   --   72*  56   --    BILT  0.8   --   0.5  0.3   --    TP  8.6*   --   6.8  6.1*   --     < > = values in this interval not displayed.        Estimated Creatinine Clearan

## 2019-03-10 NOTE — PLAN OF CARE
NURSING DISCHARGE NOTE    Discharged Home via Wheelchair. Accompanied by Support staff  Belongings Taken by patient/family. Spoke with Dr. Kavita Rangel about hypoglycemia that resolved. Plan is to still discharge patient.     Patient and spouse were

## 2019-03-11 NOTE — PAYOR COMM NOTE
--------------  CONTINUED STAY REVIEW    Payor: Obie Tinsley #:  [id number]  Authorization Number: [id number]    Admit date: 3/7/19  Admit time: 1857    Admitting Physician: Apolinar Fountain MD  Primary Care Physician

## 2019-03-12 NOTE — DISCHARGE SUMMARY
DEVENDRA HOSPITALIST  DISCHARGE SUMMARY     Ori Flanagan Patient Status:  Inpatient    1970 MRN [id number]   [facility] 3NE-A Attending No att. providers found   Hosp Day # 3 PCP [place], DO     Date of Admission: 3/7/2019  Da Discharge:   · none    Consultants:  • Pulm CC    Discharge Medication List:     Discharge Medications      START taking these medications      Instructions Prescription details   LORazepam 0.5 MG Tabs  Commonly known as:  ATIVAN      Take 1 tablet (0.5 mg as: ZOFRAN      Take 1 tablet (4 mg total) by mouth every 8 (eight) hours as needed for Nausea.    Quantity:  30 tablet  Refills:  1     Pantoprazole Sodium 40 MG Tbec  Commonly known as:  PROTONIX      Take 1 tablet (40 mg total) by mouth every morning be MD    Time spent:  > 30 minutes

## 2019-03-12 NOTE — TELEPHONE ENCOUNTER
NCM spoke with pt today for a condition update. Pt d/c'd from hospital on 3/10/19 dx DKA without coma r/t DM2 w/ hyperglycemia, accelerated HTN, anemia, alcohol withdrawal syndrome. Pt states he continues to have some pain on left side and chest area.   P

## 2019-03-12 NOTE — PROGRESS NOTES
Condition Update Post Discharge   Discharge Date: 3/10/19  Contact Date: 3/11/2019    Consent Verification:  Assessment Completed With: Patient  HIPAA Verified? Yes    General:   • How have you been since your discharge from the hospital/facility?  Pt st concerns with constipation?  No complaints    Discharge Instructions:    • TCM Nurse Care manager reviewed AVS with patient:  Yes  • When you were leaving the hospital were your discharge instructions explained to you? yes   • How well were your discharge i Oral Tab Take 1 tablet (50 mg total) by mouth 2 (two) times daily. Disp: 60 tablet Rfl: 3   Insulin Lispro (ADMELOG SOLOSTAR) 100 UNIT/ML Subcutaneous Solution Pen-injector Inject 10 units into skin every meal. Give addl 1 unit every 50 pt >150.  Call MD if NCM: AVS Summary reviewed with pt. Education provided to pt. NORMA sent a TE to PCP with a pt condition update and HFU appt request for pt. Provided support and encouragement to pt regarding his healthcare.       [x]  Discharge Summary, Discharge medicatio

## 2019-03-18 NOTE — PROGRESS NOTES
Yvonne Jimenez is a 54year old male. HPI:   Patient is here for hospital follow-up. Unfortunately he has been in the hospital again for detox. Patient complains of continued abdominal pain.   Patient states that the pain radiates to the left upper a daily. Disp:  Rfl:    traMADol HCl 50 MG Oral Tab Take 50 mg by mouth daily as needed for Pain. Disp:  Rfl:    divalproex Sodium  MG Oral Tablet 24 Hr Take 500 mg by mouth 2 (two) times daily.  Disp:  Rfl:    Enalapril Maleate 20 MG Oral Tab Take 20 m Use      Smoking status: Never Smoker      Smokeless tobacco: Never Used    Alcohol use:  Yes      Alcohol/week: 4.2 oz      Types: 7 Shots of liquor per week      Comment: drank last night-7 small bottles    Drug use: No       Results for orders placed or Negative mg/dl    Bilirubin Urine Negative Negative    Ketones Urine 80  (A) Negative mg/dL    Blood Urine Small (A) Negative    pH Urine 5.0 4.5 - 8.0    Protein Urine 100  (A) Negative mg/dl    Urobilinogen Urine <2.0 0.2 - 2.0 mg/dL    Nitrite Urine Neg Value Ref Range    Glucose 90 70 - 99 mg/dL    Sodium 137 136 - 145 mmol/L    Potassium 3.6 3.5 - 5.1 mmol/L    Chloride 109 (H) 98 - 107 mmol/L    CO2 19.0 (L) 21.0 - 32.0 mmol/L    Anion Gap 9 0 - 18 mmol/L    BUN 8 7 - 18 mg/dL    Creatinine 0.48 (L) 0. POCT GLUCOSE   Result Value Ref Range    POC Glucose 155 (H) 70 - 99 mg/dL   POCT GLUCOSE   Result Value Ref Range    POC Glucose 87 70 - 99 mg/dL   RAINBOW DRAW BLUE   Result Value Ref Range    Hold Blue Auto Resulted    RAINBOW DRAW LAVENDER   Result V Monocyte Absolute 0.98 0.10 - 1.00 x10(3) uL    Eosinophil Absolute 0.01 0.00 - 0.70 x10(3) uL    Basophil Absolute 0.03 0.00 - 0.20 x10(3) uL    Immature Granulocyte Absolute 0.01 0.00 - 1.00 x10(3) uL    Neutrophil % 40.1 %    Lymphocyte % 48.0 %    M SpO2 99%   BMI 25.28 kg/m²   GENERAL: well developed, well nourished,in no apparent distress but going through withdrawal -- lips and hands shaking  PSYCHE: normal mood and affect  SKIN: no rashes,no suspicious lesions  HEENT: atraumatic, normocephalic,ear alcohol  Alcoholism (hcc)  -- abstain for alcohol; lorazepam prn  Severe episode of recurrent major depressive disorder, without psychotic features ([facility])  -- restart quetiapine and sertraline; lorazepam prn ; spoke with Mehnaz today  Type 2 diabetes mellitu

## 2019-03-25 NOTE — TELEPHONE ENCOUNTER
Does patient seen pain specialist? If no- can have him see pain management for his chronic back pain

## 2019-03-25 NOTE — TELEPHONE ENCOUNTER
Letter of determination received from [facility], tramadol is denied.   Pt needs to have Long-term pain due to active cancer, must be eligible for hospice care, or perscriber must be a pain specialist.  Please advise

## 2019-04-09 NOTE — PROGRESS NOTES
Patient has diagnosis of diabetes. No diabetic eye exam on file. I have placed a referral for Dr. Sagrario Ferguson have patient follow-up with him regarding diabetic eye exam and remind patient the importance of annual diabetic eye exams.   Please remind

## 2019-04-15 NOTE — TELEPHONE ENCOUNTER
Medical Records Request received from [facility] for records from 01/01/2019 to present. Records printed and faxed to [phone] on 4/15/19. Release also sent to be scanned to patients chart.

## 2019-05-06 NOTE — TELEPHONE ENCOUNTER
Calling to speak to nurse regarding care coordination. They are required to call every 90 days to check in with PCP.

## 2019-06-03 NOTE — TELEPHONE ENCOUNTER
Lashanda Number from [facility]- ID # [id number]  [phone]  Ext [phone]    Asking for a call back regarding Patient.

## 2019-06-05 NOTE — TELEPHONE ENCOUNTER
Spoke to [employer] from [facility]-ID #[id number]. Phone [phone], ext . He asked questions about the patient.  Diagnosis, what kind of medications he takes, is he compliant with taking them, is he compliant with seeing MD, when last visit, when is next

## 2019-07-04 PROBLEM — E86.0 DEHYDRATION: Status: ACTIVE | Noted: 2019-01-01

## 2019-07-04 PROBLEM — E87.1 HYPONATREMIA: Status: ACTIVE | Noted: 2019-01-01

## 2019-07-04 PROBLEM — E11.65 UNCONTROLLED TYPE 2 DIABETES MELLITUS WITH HYPERGLYCEMIA (HCC): Status: ACTIVE | Noted: 2019-01-01

## 2019-07-04 PROBLEM — D64.9 ANEMIA, UNSPECIFIED TYPE: Status: ACTIVE | Noted: 2019-01-01

## 2019-07-05 NOTE — CONSULTS
Pulmonary / Critical Care H&P/Consult       NAME: Casey Enrique - ROOM: 457/457-A - MRN: [id number] - Age: 54year old - :  1970    Date of Admission: 2019  9:26 PM  Admission Diagnosis: Dehydration [E86.0]  Hyponatremia [E87.1]  Anemia, un Not on file      Highest education level: Not on file    Occupational History      Not on file    Social Needs      Financial resource strain: Not on file      Food insecurity:        Worry: Not on file        Inability: Not on file      Transportation nee Thyroid Disorder Mother    • Heart Disorder Mother    • Diabetes Maternal Grandfather         Home Medications:    Outpatient Medications Marked as Taking for the 7/4/19 encounter [facility] Encounter):  QUEtiapine Fumarate 100 MG Oral Tab Take 1 tablet (10 Blocker)   • Pantoprazole Sodium  40 mg Oral QAM AC   • QUEtiapine Fumarate  100 mg Oral Nightly   • Sertraline HCl  100 mg Oral Daily   • enoxaparin  40 mg Subcutaneous Daily   • Thiamine HCl  100 mg Oral Daily   • multivitamin with minerals  1 tablet Cornelia Dakins thyroid:  No enlargement/tenderness/nodules; no carotid    bruit or JVD   Back:     Symmetric, no curvature, ROM normal, no CVA tenderness   Lungs:     Clear to auscultation bilaterally, respirations unlabored   Chest wall:    No tenderness or deformity

## 2019-07-05 NOTE — DIETARY NOTE
[address]     Admitting diagnosis:  Dehydration [E86.0]  Hyponatremia [E87.1]  Anemia, unspecified type [D64.9]  Uncontrolled type 2 diabetes mellitus with hyperglycemia ([facility] [E11.65]    Ht: 167.6 cm (5' 6\

## 2019-07-05 NOTE — H&P
DEVENDRA HOSPITALIST  History and Physical     Marilin Arlyn Patient Status:  Emergency    1970 MRN [id number]   Location [address] Attending No att. providers found   Hosp Day # 0 PCP [place], DO     Chief Compl ITCHING    Comment:Itching, dizziness             Itching, dizziness  Phenytoin               ITCHING    Comment:irriatibility    Medications:    No current facility-administered medications on file prior to encounter.    Current Outpatient Medications on F Tartrate 50 MG Oral Tab Take 1 tablet (50 mg total) by mouth 2 (two) times daily.  Disp: 60 tablet Rfl: 3   Insulin Lispro (ADMELOG SOLOSTAR) 100 UNIT/ML Subcutaneous Solution Pen-injector Inject 10 units into skin every meal. Give addl 1 unit every 50 pt > <0.1*   TP 7.5       Estimated Creatinine Clearance: 72.3 mL/min (based on SCr of 1.14 mg/dL). No results for input(s): PTP, INR in the last 168 hours. Recent Labs   Lab 07/04/19  2145   TROP <0.045       Imaging: Imaging data reviewed in Epic.

## 2019-07-05 NOTE — PLAN OF CARE
Patient admitted to ICU on an insulin gtt. Endocrine consulted. Q1 hour blood sugar monitoring until able to transition patient over to subcutaneous in the morning. D5.9 infusing at 100cc/hr per order. Patient slept most of the night. He is oriented.  Ramona Ramon

## 2019-07-05 NOTE — CONSULTS
ENDOCRINOLOGY CONSULTATION    Attending physician:  Sejal Galan MD  Consulting physican:  Jessee Strong MD    Admission Date:  7/4/2019  Consultation Date:  7/5/2019      Reason for consultation: Mgmt of Type 1 DM    Chief Complaint:  Admitted Depression    • Diabetes ([facility]    • ETOH abuse    • High blood pressure    • Pancreatitis, alcoholic, acute    • Scoliosis    • Seizure disorder [facility])    • Unspecified essential hypertension          Past Surgical History:   Procedure Laterality Date   • C times daily. Disp: 60 tablet Rfl: 3   Insulin Lispro (ADMELOG SOLOSTAR) 100 UNIT/ML Subcutaneous Solution Pen-injector Inject 10 units into skin every meal. Give addl 1 unit every 50 pt >150. Call MD if blood sugar  >400. Max 45 units daily Disp: 15 mL Rfl: (ATIVAN) injection 4 mg 4 mg Intravenous Q10 Min PRN   PEG 3350 (MIRALAX) powder packet 17 g 17 g Oral Daily PRN   ondansetron HCl (ZOFRAN) injection 4 mg 4 mg Intravenous Q6H PRN   Metoclopramide HCl (REGLAN) injection 10 mg 10 mg Intravenous Q8H PRN   Th ITCHING    Comment:irriatibility  Norco [Hydrocodone-*    ITCHING    Comment:Itching, dizziness             Itching, dizziness  Phenytoin               ITCHING    Comment:irriatibility        Social History    Socioeconomic History      Marital status: Mar Latest Ref Rng & Units 4/18/2018   TSH      0.350 - 5.500 mIU/mL 0.634         Component      Latest Ref Rng & Units 7/4/2019   ETHYL ALCOHOL      <3 mg/dL 106 (H)       Component      Latest Ref Rng & Units 7/5/2019 7/4/2019   Glucose      70 - 99 mg/dL 1 outpatient. We will follow with you. Thank you for the consultation.     Shawna Williamson MD  Endocrinology, Diabetes and Metabolism  [facility]

## 2019-07-05 NOTE — PROGRESS NOTES
DEVENDRA HOSPITALIST  Progress Note     Frances Brooks Patient Status:  Inpatient    1970 MRN [id number]   [facility] 4SW-A Attending Justen Morrell MD   Hosp Day # 1 PCP Reggie Leach DO     Chief Complaint: hyperglycemia    S: Rafat Diaz multivitamin with minerals  1 tablet Oral Daily   • folic acid  1 mg Oral Daily   • sodium chloride   Intravenous Once   • Potassium Chloride ER  40 mEq Oral Q4H   • pantoprazole (PROTONIX) IV push  40 mg Intravenous Q24H   • insulin detemir  10 Units Subc

## 2019-07-05 NOTE — ED PROVIDER NOTES
Patient has a significant medical history of diabetes. In reviewing his medical records, I note a hospitalization about 3 months ago for alcohol withdrawal with tremors, nausea, and elevated blood sugar. He was admitted to the ICU.  He was complaining of hyperglycemia due to medication noncompliance. He is dehydrated and hyponatremic  Patient was treated with 1.5 L IV bolus. he then received multivitamin, thiamine, and folate and additional normal saline to correct the hyponatremia.     He has some epigastr

## 2019-07-05 NOTE — ED PROVIDER NOTES
Patient Seen in: [facility] Emergency Department    History   Patient presents with:  Urinary Symptoms (urologic)    Stated Complaint: URINARY FREQUENCY    HPI    CHIEF COMPLAINT: Urinary frequency    HISTORY OF PRESENT ILLNESS: Patient is a 55-year reviewed and is noncontributory to the presenting problem, except as indicated as above.     Past Medical History:   Diagnosis Date   • Back problem    • Depression    • Diabetes ([facility] [address]    • ETOH abuse    • High blood pressure    • Pancreatitis, alcoholic, a hypertrophy. no trismus or stridor. Uvula midline. No phonation changes, patient handling secretions well.  Mucous membranes moist.  Respiratory: there are no retractions, lungs are clear to auscultation  Cardiovascular: regular rate and rhythm, normal S1, components:    POC Glucose 492 (*)     All other components within normal limits   CBC W/ DIFFERENTIAL - Abnormal; Notable for the following components:    WBC 11.7 (*)     RBC 2.83 (*)     HGB 7.3 (*)     HCT 22.9 (*)     .0 (*)     MCH 25.8 (*) patient's history, physical and course while in the emergency department as well as laboratory work-up. I discussed the radiology and laboratory results with the patient. I discussed the diagnosis and admission for further evaluation and care.  Patient sta

## 2019-07-05 NOTE — CONSULTS
[facility]                       Gastroenterology [address] [facility]    [facility] Patient Status:  Inpatient    1970 MRN [id number]   [facility] 4SW-A Attending Memo Alexis MD   [facility])    • Unspecified essential hypertension        PSHx:   Past Surgical History:   Procedure Laterality Date   • COLONOSCOPY N/A 12/4/2018    Performed by Tasia Vega MD at [facility] ENDOSCOPY   • ESOPHAGOGASTRODUODENOSCOPY (EGD) N/A 12/4/20 Intravenous Q8H PRN   Thiamine HCl tab 100 mg 100 mg Oral Daily   multivitamin with minerals (ADULT) tab 1 tablet 1 tablet Oral Daily   folic acid (FOLVITE) tab 1 mg 1 mg Oral Daily   metroNIDAZOLE (METROGEL) 0.75 % gel  Topical BID   Insulin Regular Human chronic anemia            Dermatologic: The patient reports no recent rashes or chronic skin disorders            Rheumatologic: The patient reports no history of chronic arthritis, myalgias, arthralgias            Genitourinary:  The patient reports no hi PTT 28.8 07/04/2019    INR 0.98 07/04/2019    PTP 13.4 07/04/2019     07/04/2019    MG 1.6 07/05/2019     Recent Labs   Lab 07/04/19  2145 07/05/19  0431   * 153*   BUN 8 6*   CREATSERUM 1.14 0.60*   GFRAA 88 138   GFRNAA 76 120   CA 9.1 8 Duodenal plaque, biopsy:  -Duodenal mucosa with patchy superficial lymphangiectasia. -Negative for dysplasia or malignancy.     C. Stomach, antrum, biopsy:  -Mild reactive gastropathy.  -No morphologic evidence of Helicobacter pylori organisms.     [facility]  [phone]

## 2019-07-05 NOTE — PLAN OF CARE
Received patient resting in bed. Drowsy in the early morning. More alert as the day goes on. Oriented x4. Leela Damon to go home. Room air. Ambulates in room. VSS. Insulin gtt transitioned to AC/HS subq insulin. Blood sugars stable.  Carb control diet started, to results as appropriate  - Fluid restriction as ordered  - Instruct patient on fluid and nutrition restrictions as appropriate  Outcome: Progressing  Goal: Hemodynamic stability and optimal renal function maintained  Description  INTERVENTIONS:  - Monitor l \"eat\"    Interventions:   - insulin gtt and endocrine consult  -transition to subcutaneous insulin in the morning  - See additional Care Plan goals for specific interventions   Outcome: Progressing

## 2019-07-05 NOTE — PLAN OF CARE
Patient transferred from ICU to floor at 1740. WA protocol continued. Tele monitor placed. Seizure precautions in place. NPO at midnight for EGD in the AM. Staff will continue to monitor.

## 2019-07-05 NOTE — PAYOR COMM NOTE
--------------  ADMISSION REVIEW     Payor: Obie Tinsley #:  [id number]  Authorization Number: [id number]    Admit date: 7/4/19  Admit time: 2357       Admitting Physician: Kristie Kelley MD  Attending Physician:  Abisai Hennessy Pulse 100   Temp 98.2 °F (36.8 °C) (Oral)   Resp 17   Ht 167.6 cm (5' 6\")   Wt 70.3 kg   SpO2 100%   BMI 25.02 kg/m²         exam ;  Patient is uncircumcised, there is no phimosis or paraphimosis, with traction of the foreskin, there is erythema and wh chloride of 90. Patient's anion gap is 11 with an alk phos of 139.    2245: Case discussed with Dr. Nicky Nagy, hospitalist, inform patient history, physical, laboratory work-up and IV fluids/insulin.     Xr Chest Pa + Lat Chest (cpt=71046)    Result Date: 7/4/ Lab 07/04/19  2145   TROP <0.045         ASSESSMENT / PLAN:     1. DM with hyperglycemia due to EtOH use and insulin noncompliance   1. Endo  2. Insulin drip   2. Hyponatremia, hypovolemic   1. IVF, monitor   3.  EtOH intoxication, Hx of EtOh withdrawal a 7  4. FEN: NPO, IVFs  5. Prophy: hold lmwh  6. Dispo:  ICU monitoring while requiring insulin gtt. PLEASE PROVIDE INPATIENT AUTHORIZATION. THANK YOU.

## 2019-07-06 NOTE — ANESTHESIA POSTPROCEDURE EVALUATION
[address] [place] [address] Patient Status:  Inpatient   Age/Gender 54year old male MRN [id number]   Location [facility]. Attending Jose Enrique Morales MD   [facility] Day # 2 PCP Yumiko Andrews DO       Anesthesia Post-op Note    Procedure(s)

## 2019-07-06 NOTE — PROGRESS NOTES
NURSING DISCHARGE NOTE    Discharged Home via Ambulatory. Accompanied by Family member  Belongings Taken by patient/family. Pt discharged home. Discharge papers and prescription given to pt.  Second prescription was electronically sent to pt pharmac

## 2019-07-06 NOTE — PROGRESS NOTES
ENDOCRINOLOGY PROGRESS NOTE    Typed by Milli Bhagat MD on 7/6/2019      S:  Pt had EGD today. He is going to be discharged.       O:  BP (!) 152/103 (BP Location: Left arm)   Pulse 72   Temp 98 °F (36.7 °C) (Oral)   Resp 15   Ht 66\"   Wt 153 l 13.1 (H)   RBC      4.30 - 5.70 x10(6)uL 3.58 (L)   Hemoglobin      13.0 - 17.5 g/dL 9.0 (L)   Hematocrit      39.0 - 53.0 % 28.6 (L)   Platelet Count      753.5 - 450.0 10(3)uL 568.0 (H)         Assessment and Plan:    1.  Type 1 DM: uncontrolled with hype

## 2019-07-06 NOTE — PLAN OF CARE
Assumed care at 0730. Pt a/ox4, VSS, on RA, NSR on telemetry. Pt with no c/o pain, n/v/d, SOB, dizziness/lightheadedness. NPO with sips of water with meds maintained. Seizure precautions in place. Potassium and magnesium replacement ordered per protocol.  A stability and optimal renal function maintained  Description  INTERVENTIONS:  - Monitor labs and assess for signs and symptoms of volume excess or deficit  - Monitor intake, output and patient weight  - Monitor urine specific gravity, serum osmolarity and Outcome: Progressing

## 2019-07-06 NOTE — PLAN OF CARE
Pt A&Ox4. Room air. NSR on tele. VSS. QID accu checks. Saline locked. Monitoring hgb. NPO since midnight for EGD today. CIWA protocol D/C. Staff will continue to monitor.        Problem: SAFETY ADULT - FALL  Goal: Free from fall injury  Description  INTERVE weight  - Monitor urine specific gravity, serum osmolarity and serum sodium as indicated or ordered  - Monitor response to interventions for patient's volume status, including labs, urine output, blood pressure (other measures as available)  - Encourage or

## 2019-07-06 NOTE — OPERATIVE REPORT
Operative Report-Esophagogastroduodenoscopy      PREOPERATIVE DIAGNOSIS/INDICATION:  1. Acute on chronic anemia  2. History of dark stool  POSTOPERTATIVE DIAGNOSIS:  1. Persistent extrinsic compression in gastric cardia/fundus  2. - No explanation for anemia on above endoscopy  - Proximal gastric changes appear to be related to post-splenectomy fluid collection on CT-d/w Dr. Mikie White  - Since he has had no evidence of bloody stools here and hemoglobin is stable, ok for dc home fr

## 2019-07-06 NOTE — PROGRESS NOTES
[facility] HOSPITALIST  Progress Note     Jennifer Mayen Patient Status:  Inpatient    1970 MRN [id number]   [facility] 4SW-A Attending Brunilda Bennett MD   Hosp Day # 2 PCP Gemma Sawant DO     Chief Complaint: hyperglycemia    S: Laretta Runner ER  40 mEq Oral Once   • magnesium oxide  400 mg Oral Once   • amLODIPine Besylate  10 mg Oral Daily   • divalproex Sodium ER  500 mg Oral BID   • Enalapril Maleate  20 mg Oral Daily   • Metoprolol Tartrate  50 mg Oral 2x Daily(Beta Blocker)   • QUEtiapine

## 2019-07-06 NOTE — ANESTHESIA PREPROCEDURE EVALUATION
PRE-OP EVALUATION    Patient Name: Lisa Bruno    Pre-op Diagnosis: Melena [K92.1]  Anemia, unspecified type [D64.9]    Procedure(s):  ESOPHAGOGASTRODUODENOSCOPY (EGD)    Surgeon(s) and Role:     Deanna Mendieta MD - Primary    Pre-op vitals Intravenous Once   [COMPLETED] Potassium Chloride ER (K-DUR M20) CR tab 40 mEq 40 mEq Oral Q4H   [COMPLETED] magnesium oxide (MAG-OX) tab 400 mg 400 mg Oral Once   Pantoprazole Sodium (PROTONIX) 40 mg in Sodium Chloride 0.9 % 10 mL IV push 40 mg Intravenou Take 1 tablet (0.5 mg total) by mouth 2 (two) times daily as needed for Anxiety. Disp: 15 tablet Rfl: 0   insulin glargine 100 UNIT/ML Subcutaneous Solution Pen-injector Inject 10 Units into the skin 2 (two) times daily.  Disp:  Rfl:    divalproex Sodium ER ENDOSCOPY   • ESOPHAGOGASTRODUODENOSCOPY (EGD) N/A 12/4/2018    Performed by Ni Overton MD at [facility] ENDOSCOPY   • EXPLORATORY LAPAROTOMY N/A 3/25/2018    Performed by Bhavya Tony MD at [facility] MAIN OR   • [address] [place] [address]    [facility]                Present on Admission:  **None**

## 2019-07-08 NOTE — DISCHARGE SUMMARY
[facility] [place] HOSPITALIST  DISCHARGE SUMMARY     Kal Gil Patient Status:  Inpatient    1970 MRN [id number]   [facility] 3NE-A Attending Madhuri Coates MD   [address] Day # 2 PCP Yong Power DO     Date of Admission: 2019  Date of clotrimazole 1 % Crea  Commonly known as:  LOTRIMIN      Apply 1 Application topically 2 (two) times daily for 14 days.    Stop taking on:  7/20/2019  Quantity:  1 Tube  Refills:  0     Ferrous Sulfate 325 (65 Fe) MG Tabs      Take 1 tablet (325 mg total) Metoprolol Tartrate 50 MG Tabs  Commonly known as:  LOPRESSOR      Take 1 tablet (50 mg total) by mouth 2 (two) times daily. Quantity:  60 tablet  Refills:  3     multivitamin Tabs      Take 1 tablet by mouth daily.    Quantity:  30 tablet  Refills:  0 [id number]    iron deficiency anemia    Appointments for Next 30 Days 7/8/2019 - 8/7/2019    None          Vital signs:       Physical Exam:    General: No acute distress.    Respiratory: Clear to auscultation bilaterally  Cardiovascular: S1, S2  Abdomen: S

## 2019-07-08 NOTE — PAYOR COMM NOTE
--------------  DISCHARGE REVIEW    Payor: Obie Tinsley #:  [id number]  Authorization Number: [id number]    Admit date: 7/4/19  Admit time:  2357  Discharge Date: 7/6/2019  4:00 PM     Admitting Physician: Roxi Hoffman

## 2019-07-10 NOTE — PROGRESS NOTES
HPI:    Liam Armenta is a 54year old male here today for hospital follow up.    He was discharged from Inpatient hospital, [facility] to Home   Admit Date: 7/4/19   Discharge Date: 7/6/19   Hospital Discharge Diagnosis:    EtOH intoxication, hyp 1/19      Allergies:  He is allergic to dilantin; norco [hydrocodone-acetaminophen]; and phenytoin. Current Meds:    Current Outpatient Medications on File Prior to Visit:  acetaminophen 500 MG Oral Tab Take 1,000 mg by mouth 2 (two) times daily.    SUHA HydrOXYzine HCl 50 MG Oral Tab Take 1 tablet (50 mg total) by mouth 3 (three) times daily as needed for Anxiety. multivitamin Oral Tab Take 1 tablet by mouth daily.  (Patient not taking: Reported on 7/5/2019 )     No current facility-administered medica Height as of this encounter: 66\". Weight as of this encounter: 160 lb.    /80 (BP Location: Left arm, Patient Position: Sitting, Cuff Size: adult)   Pulse 66   Temp 98.1 °F (36.7 °C) (Oral)   Resp 16   Ht 66\"   Wt 160 lb   SpO2 99%   BMI 25.82 kg mellitus with hyperglycemia ([facility] [address]  -     OP REFERRAL PROVIDER VISIT-DIABETES    LUQ pain  -     SURGERY - INTERNAL    Urinary frequency        No orders of the defined types were placed in this encounter.       Meds & Refills for this Visit:  Requested Pres
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
## 2019-07-14 NOTE — ED NOTES
Patient is calm and cooperative at this time. Patient was undressed and dressed into hospital gown. All belongings secured by RN and PCT and given to security.  Patient educated on seclusion protocol, A/V monitoring, plan of care, and how to reach the nurse

## 2019-07-14 NOTE — ED NOTES
All of the patients belongings are removed  They are bagged/labeled and put into 2 smart lock bags  Bag #1 ! [id number]  This bag includes   #wallet (has unknown amount of credit debit cards and no cash)   #cell phone  Bag #2 #[id number]  This bag includes   #s

## 2019-07-15 NOTE — ED NOTES
Patient is awake and alert. GLEN to assess. Patient's wife called asking for information. Patient declined information to be released. RN did not disclose condition.
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
## 2019-07-15 NOTE — ED NOTES
Patient educated on discharge instructions and encouraged to follow up for further management, verbalized understanding. IV removed intact. Patient given belongings back from security and opened in front of him. Ambulatory with a steady gait for discharge.

## 2019-07-16 NOTE — ED NOTES
Pt's belongings inventoried and sealed in bag [id number].  Please refer to inventory sheet for details
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
## 2019-07-16 NOTE — ED PROVIDER NOTES
Patient Seen in: [facility] Emergency Department    History   Patient presents with:  Eval-P (psychiatric)  Alcohol Intoxication (neurologic)    Stated Complaint: bipolar-off meds, eval-p    HPI    53-year-old male brought into ED secondary to evalu BP (!) 140/102   Pulse 91   Resp 14   Temp 98 °F (36.7 °C)   Temp src Temporal   SpO2 100 %   O2 Device None (Room air)       Current:BP (!) 130/93   Pulse 78   Temp 98 °F (36.7 °C) (Temporal)   Resp 13   SpO2 100%         Physical Exam   Constitutional: (*)     MCH 25.9 (*)     RDW 23.0 (*)     RDW-SD 67.2 (*)     Lymphocyte Absolute 8.25 (*)     All other components within normal limits   POTASSIUM - Normal   CBC WITH DIFFERENTIAL WITH PLATELET    Narrative:      The following orders were created for pane

## 2019-07-16 NOTE — ED NOTES
Per pt wife, once coming home from ED yesterday pt continued to drink. She received several calls and texts from pt and their children that pt was aggressive, saying that their son needed to be disciplined.  Pt had several episodes of aggressive speech and

## 2019-07-16 NOTE — ED NOTES
Patient provided with referrals for outpatient detox programs. Per [facility] [facility]. Patient going home to his mother's house.
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
## 2019-07-16 NOTE — ED PROVIDER NOTES
Patient reevaluated 1550. Sitting comfortably watching television. Calm and cooperative. Denies suicidal or homicidal ideation. Declines any treatment for alcohol abuse.   He was seen and evaluated by staff from [facility] and was recommended for [employer]

## 2019-07-25 NOTE — TELEPHONE ENCOUNTER
JANIA for devan to  to inquire further on this. Per her vmail prompt, she is , behavior coordinator for [place].

## 2019-07-25 NOTE — TELEPHONE ENCOUNTER
Svetlana from [address] [place] [address] [phone] is calling to do a 90 day check in on patient. Thank you.

## 2019-07-26 NOTE — TELEPHONE ENCOUNTER
Last refill for medication 12/10/2018(qty-30, refill-2)  Please advise, please approve if appropriate, thank you.

## 2019-07-29 NOTE — ED PROVIDER NOTES
Patient Seen in: [facility] Emergency Department    History   Patient presents with:  Hypoglycemia (metabolic)  Seizure Disorder (neurologic)    Stated Complaint: Hypoglycemia/Seizure    HPI    19-year-old male with a history of alcohol abuse, chron Types: 7 Shots of liquor per week      Comment: drank last night-7 small bottles    Drug use: No      Review of Systems    Positive for stated complaint: Hypoglycemia/Seizure  Other systems are as noted in HPI. Constitutional and vital signs reviewed. POTASSIUM - Abnormal; Notable for the following components:    Potassium 3.1 (*)     All other components within normal limits   AST (SGOT) - Abnormal; Notable for the following components:    AST 63 (*)     All other components within normal limits   PO uncompliant generally according to his wife specifically with his seizure medications and is abstinence from alcohol. Last night he drank alcohol did not take his seizure medications and woke her up having a seizure this morning.   She states that she did intermit use of medication  Hypoglycemia  Alcoholic intoxication with complication ([facility] [address]  Chronic abdominal pain    Disposition:  Discharge  7/29/2019  8:24 am    Follow-up:  DO Adonis Perez 96    S

## 2019-07-29 NOTE — ED NOTES
Patient called his wife who, per patient, is on her way to pick him up for discharge home. Awaiting wife's arrival before patient discharge to be completed.

## 2019-07-29 NOTE — ED INITIAL ASSESSMENT (HPI)
Pt to ED brought by EMS for hypoglycemic episode & seizure x 15 mins, as witnessed by SO. Pt's glucose per EMS was reading \"LO\", Glucagon 1 mg given IM PTA. Pt arrives A/A/O x 3. Hx of Alcohol Abuse.

## 2019-07-29 NOTE — ED NOTES
Patients belongings places in smart safe bag [id number] containing underpants t-shirt and shorts. Did not collect patient wedding band.

## 2019-07-29 NOTE — ED NOTES
5482 - Chemistry called stating that the mint top recently re-drawn was hemolyzed. Redraw is needed again. They will send Phlebotomist to re-draw Pt's blood. 5772 - Pt made aware that blood re-draw is needed. He verbalized understanding.  ED Provider - D

## 2019-07-29 NOTE — ED NOTES
wife to bs to drive pt home.  +tremors pt stating he knows he is not supposed to drink ETOH and has attempted to quit in past without success

## 2019-07-30 NOTE — H&P
New Patient Visit Note       Active Problems      1. S/P splenectomy    2. Alcohol dependence with withdrawal with complication ([address]    3. Uncontrolled type 2 diabetes mellitus with hyperglycemia ([address]    4. Pancreatic pain    5.  Alcohol-induced chronic p DAILY, Disp: 30 tablet, Rfl: 0  •  acetaminophen 500 MG Oral Tab, Take 1,000 mg by mouth 2 (two) times daily. , Disp: , Rfl:   •  QUETIAPINE FUMARATE 100 MG Oral Tab, TAKE 1 TABLET(100 MG) BY MOUTH EVERY NIGHT, Disp: 15 tablet, Rfl: 0  •  LEVEMIR FLEXTOUCH Systems  The Review of Systems has been reviewed by me during today. Review of Systems   Constitutional: Positive for fatigue. Negative for activity change, appetite change, chills and fever.    HENT: Negative for congestion, ear pain, hearing loss and sor breath sounds normal. No respiratory distress. Abdominal: Soft. Normal appearance and bowel sounds are normal. He exhibits no shifting dullness, no distension, no fluid wave, no ascites and no mass.  There is tenderness in the epigastric area and left upp that he still gets epigastric pain that radiates to his back from his chronic pancreatitis. He states that this pain feels different from his pain with his pancreatitis.   He states that the left upper quadrant pain is not always associated with the epigas to undergo physical therapy and based on their recommendations the patient may need orthotics as well. I also discussed with the patient the importance of alcohol avoidance. It is imperative for his health that he decrease and stop drinking alcohol.   Veronique Rai

## 2019-08-01 NOTE — TELEPHONE ENCOUNTER
Attempt to reach patient unsuccessful, mobile number rings with no answer. Need to give patient the following resources:  Kleber Burns [address] [place] [address]: [phone]  [address] in [facility]: [facility]/ [address] [place] [address] in [place]:[phone]  Can somebody try again?

## 2019-08-01 NOTE — TELEPHONE ENCOUNTER
Svetlana, a care coordinator for Ivanna Manning [facility], calling asking to speak to a nurse regarding care coordination. Please advise.

## 2019-08-06 NOTE — TELEPHONE ENCOUNTER
Cell rings with no answer, again. I can't call anyone else as he's going through divorce.  See Gifty Joseph consult

## 2019-08-22 NOTE — TELEPHONE ENCOUNTER
Please call pt to schedule follow up with Dr. Shayna Nuñez.    LOV: 7/10/19 asked to return in 6 weeks    Thank you

## 2019-08-23 NOTE — TELEPHONE ENCOUNTER
Patient is due for 6 week follow up after hosp follow up on 7/10/19. Front staff has attempted to schedule an appointment. Please see pending medication refill if appropriate.

## 2019-08-28 PROBLEM — E87.6 HYPOKALEMIA: Status: ACTIVE | Noted: 2019-01-01

## 2019-08-28 NOTE — ED NOTES
Per wife pt had made a comment yesterday morning, \"Dont' call EMS if something happens to me. \" and then was laughing and having dinner acting normal. Wife had kids check on him constantly yesterday and was okay.

## 2019-08-28 NOTE — H&P
[facility]  History and Physical     Caseycarol Mary Patient Status:  Emergency    1970 MRN [id number]   Location [address] [facility] Attending Ad Pulido MD   Hosp Day # 0 PCP Zuleika Weiner DO     Chief Complaint never smoked. He has never used smokeless tobacco. He reports that he drinks about 7.0 standard drinks of alcohol per week. He reports that he does not use drugs.     Family History:   Family History   Problem Relation Age of Onset   • Heart Attack Father 4 daily. Disp: 90 tablet Rfl: 1   Enalapril Maleate 20 MG Oral Tab Take 20 mg by mouth daily. Disp:  Rfl:    acetaminophen 325 MG Oral Tab Take 650 mg by mouth every 6 (six) hours as needed for Pain.  Disp:  Rfl:    AmLODIPine Besylate 10 MG Oral Tab Take 1 t assess      Diagnostic Data:      Labs:  Recent Labs   Lab 08/28/19  1410   WBC 12.1*   HGB 12.3*   MCV 83.3   .0       Recent Labs   Lab 08/28/19  1410   *   BUN 5*   CREATSERUM 1.10   GFRAA 92   GFRNAA 80   CA 9.0   ALB 3.6      K 2.9

## 2019-08-28 NOTE — ED PROVIDER NOTES
Patient Seen in: [facility] Emergency Department    History   Patient presents with:  Altered Mental Status (neurologic)    Stated Complaint: Unresponsive    HPI    Patient is a 72-year-old male brought by paramedics with altered status, seizure, hy bottles    Drug use: No             Review of Systems    Positive for stated complaint: Unresponsive  Other systems are as noted in HPI. Constitutional and vital signs reviewed. All other systems reviewed and negative except as noted above.     Physic Ethyl Alcohol 154 (*)     All other components within normal limits   VENOUS BLOOD GAS - Abnormal; Notable for the following components:    Venous pCO2 53 (*)     Venous pO2 140 (*)     Venous O2 Saturation 95 (*)     Venous O2 Sat.  Calc. 99 (*)     Venous (*)     Neutrophil Absolute 9.86 (*)     Monocyte Absolute 1.14 (*)     All other components within normal limits   CBC WITH DIFFERENTIAL WITH PLATELET    Narrative: The following orders were created for panel order CBC WITH DIFFERENTIAL WITH PLATELET. ambulance stretcher prior to being moved over to the bed. After couple of times we are able to establish IV access and D50 was given IV.   There is no significant change in his mental status and in fact he started to have more intermittently rhythmic, twit doses of Ativan. Discussed with Dr. Mehnaz La of neurology who recommends Keppra and Depakote, 1 g IV each. These have been ordered. Patient will be admitted to the ICU.       A total of 35 minutes of critical care time (exclusive of billable procedures)
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
## 2019-08-28 NOTE — ED NOTES
ERMD updated on PT status, no further orders at this time. Aware of rolling eyes and no longer seizure like movements with limbs.  This RN informed to increase Propofol and check blood sugar if PT shows further signs of seizure movements with limbs

## 2019-08-28 NOTE — PROGRESS NOTES
08/28/19 1453   Clinical Encounter Type   Visited With Family  (Spouse Rosemarie)   Routine Visit   (Responded to call)   Continue Visiting   (Empowered the spouse to call  anytime for further support)   Crisis Visit ED   Patient's Supportive Str

## 2019-08-28 NOTE — CONSULTS
Pulmonary / Critical Care H&P/Consult       NAME: Herberth Cabrera - ROOM: [facility] - MRN: [id number] - Age: 54year old - :  1970    Date of Admission: 2019  1:51 PM  Admission Diagnosis: No admission diagnoses are documented for this encounter Comment:irriatibility    Social History:  Social History    Socioeconomic History      Marital status:       Spouse name: Not on file      Number of children: Not on file      Years of education: Not on file      Highest education level: Not on file Not Asked        Self-Exams: Not Asked    Social History Narrative      Not on file         Family History:  Family History   Problem Relation Age of Onset   • Heart Attack Father 50   • Heart Disorder Father    • Thyroid Disorder Mother    • Heart Disorde Extremities normal, atraumatic, no cyanosis or edema   Pulses:   2+ and symmetric all extremities   Skin:   Skin color, texture, turgor normal, no rashes or lesions         Recent Labs   Lab 08/28/19  1410   WBC 12.1*   HGB 12.3*   HCT 37.4*   .0

## 2019-08-28 NOTE — RESPIRATORY THERAPY NOTE
PATIENT IN EMERGENCY ROOM WITH SEIZURE ACTIVITY, HYPOGLYCEMIA, AMS, SOME OBSTRUCTIVE LIKE RESPIRATIONS. INTUBATED WITH 8.0 ETT 24 AT LIP.  XRAY TAKEN  ETT SECURED  TO CAT SCAN WITHOUT PROBLEM. ABG DRAWN UPON RETURN. DR Celestine Harris.   INITIAL VENT

## 2019-08-28 NOTE — ED INITIAL ASSESSMENT (HPI)
Per EMS pt called for unresponsiveness per family, foaming at the mouth, BS 20 in route, gave one dose of glucogen, repeat BS 24, IO in place on arrival top left arm, pt with shallow slow breathing, placed on non-rebreathing, BS on arrival 50 mg/dL.  Attemp

## 2019-08-29 NOTE — PROGRESS NOTES
[address] [place] [address] Patient Status:  Inpatient    1970 MRN [id number]   [facility] 6NE-A Attending Kisha Graham MD   Hosp Day # 1 PCP Yong Power DO     Pulm / Critical Care Progress Note     S: remains intubated. Wt Readings from Last 3 Encounters:  08/29/19 : 160 lb 0.9 oz (72.6 kg)  07/30/19 : 160 lb (72.6 kg)  07/29/19 : 160 lb (72.6 kg)       No intake/output data recorded.   I/O this shift:  In: 2136 [I.V.:1586; IV PIGGYBACK:550]  Out: 1860 [Urine:1610; [facility] of this to wife recently  - psych eval with suicide precautions when extubated  4. Fevers: ? Neurogenic vs possible uti vs pneumonia  - zosyn as above  5.  Lactic acidosis: from ongoing seizure activity  - hemodynamic support  - consider abd imaging  - vane

## 2019-08-29 NOTE — PROGRESS NOTES
Received patient from ER sedated and intubated. Patient with wandering eye movements but no other movements even to painful stimuli. ST 120s-130s and SBPs 140s. Hypoglycemic event of 50 treated with 1 amp D50%. D10% gtt started at 83cc/hr.  Potassium rider

## 2019-08-29 NOTE — VASCULAR ACCESS
Consulted to place a midline. Per primary RN Kevin Paulino, pt had a PICC line placed last night. Will dc consult for VAT.

## 2019-08-29 NOTE — PAYOR COMM NOTE
--------------  ADMISSION REVIEW     Payor: Obie Tinsley #:  [id number]  Authorization Number: [id number]    Admit date: 8/28/19  Admit time: 417 [facility]       Admitting Physician: Colt Saavedra MD  Attending Physician: reviewed and negative except as noted above.     Physical Exam     ED Triage Vitals [08/28/19 1403]   BP (!) 217/133   Pulse (!) 140   Resp (!) 28   Temp    Temp src    SpO2 92 %   O2 Device Non-rebreather mask       Current:BP (!) 195/126   Pulse (!) 135 Saturation 95 (*)     Venous O2 Sat.  Calc. 99 (*)     Venous Bicarbonate 28.7 (*)     All other components within normal limits   VALPROIC ACID, (DEPAKENE) - Abnormal; Notable for the following components:    Valproic Acid 33.4 (*)     All other components created for panel order CBC WITH DIFFERENTIAL WITH PLATELET.   Procedure                               Abnormality         Status                     ---------                               -----------         ------                     CBC W/ DIFFERENTIAL[ in fact he started to have more intermittently rhythmic, twitching movements of the upper extremities or right lower extremity. Overall consistent for persistent seizures. Repeat Accu-Chek was over 200. Ativan 2 mg was given, minimal change.   He did parveen of critical care time (exclusive of billable procedures) was administered to manage the patient's neurologic instability due to his status epilepticus.   This involved direct patient intervention, complex decision making, and/or extensive discussions with t Despite this he continued to seize in the emergency department requiring intubation. No recent trauma or illness. No evidence of overdose of medications at home.       Medications:    No current facility-administered medications on file prior to encounter Rfl: 2   Ondansetron HCl (ZOFRAN) 4 mg tablet Take 1 tablet (4 mg total) by mouth every 8 (eight) hours as needed for Nausea. Disp: 30 tablet Rfl: 1   Thiamine HCl 100 MG Oral Tab Take 1 tablet (100 mg total) by mouth daily.  Disp: 90 tablet Rfl: 1   Metopr last 168 hours. No results for input(s): TROP, CK in the last 168 hours. Imaging: Imaging data reviewed in Epic. ASSESSMENT / PLAN:     1. Acute Respiratory Failure:  Vent management per pulmonary  2.  Status Epilepticus:  Neuro on consult, s/p a dextrose 50 % injection 50 mL     Date Action Dose Route User    8/28/2019 1358 Given 50 mL Intravenous Loulou Lee RN      dextrose 50 % injection 50 mL     Date Action Dose Route User    8/28/2019 1544 Given 50 mL Intravenous Loulou Lee, User    8/29/2019 1037 New Bag 500 mg Intravenous Say Angelo, RN      LORazepam (ATIVAN) injection 2 mg     Date Action Dose Route User    8/28/2019 1525 Given 2 mg Intravenous Chi Marita, RN    8/28/2019 1414 Given 2 mg Intravenous Valerie Fields Route User    8/29/2019 0516 Given 4 mg Intravenous Artis JUAN Hernandez      Midazolam HCl (VERSED) 2 MG/2ML injection     Date Action Dose Route User    8/29/2019 0515 Given (none) (none) Quique Lowry, RN      Midazolam HCl (VERSED) 250 mg in sodium mg/kg/hr × 72.6 kg (Order-Specific) Intravenous Blu Tariq RN    8/29/2019 0500 Hi-Risk Rate/Dose Change 0.4 mg/kg/hr × 72.6 kg (Order-Specific) Intravenous Blu Tariq RN      norepinephrine (LEVOPHED) 4 mg/250 ml premix infusion     Date Act User    8/29/2019 1119 New Bag 100 mcg/kg/min × 72.6 kg Intravenous Codey Mccormick RN    8/29/2019 0910 New Bag 100 mcg/kg/min × 72.6 kg Intravenous Codey Mccormick RN    8/29/2019 6012 New Bag 100 mcg/kg/min × 72.6 kg Intravenous Travis Hudson RN Dose Route User    8/29/2019 0100 New Bag 1000 mL Intravenous Ramona Sol RN      succinylcholine chloride (ANECTINE) injection     Date Action Dose Route User    8/28/2019 1434 Given 100 mg Intravenous (Left Lower Arm) Deyanira Velarde RN      Thiamin from bag for refractory status epilepticus, ondansetron HCl, LORazepam, LORazepam, LORazepam, LORazepam, fentaNYL citrate **OR** fentaNYL citrate, PEG 3350, magnesium hydroxide, bisacodyl, FLEET ENEMA, glucose **OR** Glucose-Vitamin C **OR** dextrose **OR* 2019 0.96   2019 1.10   2019 1.05   ]          Recent Labs   Lab 19  1410 19  0406   ALT 32 26   AST 68* 95*      Ab.41/34/174/21 (lactic acid 4.8)     Imaging: cxr: et tube in place.  Increase in patchy opacities bilat 100 % 160 lb 0.9 oz — —    08/29/19 0500 99.9 °F (37.7 °C) 88 22 136/106Abnormal  100 % — — —    08/29/19 0400 99.9 °F (37.7 °C) 90 22 123/97Abnormal  100 % — Ventilator —    08/29/19 0300 100.4 °F (38 °C) 92 22 130/100Abnormal  100 % — — — [facility]   08/2 Prophylaxis: SCD's  · CODE status: Full Code  · Kilpatrick: Present  · Central line: None     Will the patient be referred to TCC on discharge?: No  Estimated date of discharge: TBD  Discharge is dependent on: Progress  At this point Mr. Arana January is expected to

## 2019-08-29 NOTE — PROCEDURES
ELECTROENCEPHALOGRAM REPORT      Patient Name: Cassandra Field  Chart ID: [id number]  Ordering Physician: Dr Jackie Noble Date of Test: 8/28/2019  Referring Physician:   Patient Diagnosis: Status Epilepticus    HISTORY    Cassandra Field is a 54year old ma

## 2019-08-29 NOTE — PROGRESS NOTES
[facility]  Neurocritical Care APRN Progress Note    NAME: Parvin Holguin - ROOM: [facility] - MRN: [id number] - Age: 54year old - :1970    History Of Present Illness:  Parvin Holguin is a 54year old male with PMHx sig Types: 7 Shots of liquor per week      Frequency: 4 or more times a week      Drinks per session: 5 or 6      Binge frequency: Weekly      Comment: drank last night-7 small bottles    Drug use: No      Family History:  Family History   Problem Relation CONCLUSION: No acute intracranial findings.    Dictated by: Gonzalo Dee MD on 8/28/2019 at 15:26     Approved by: Gonzalo Dee MD            Xr Chest Ap Portable  (cpt=71045)    Result Date: 8/28/2019  CONCLUSION:  Endotracheal tube tip is in satis G- Central Line NO / Kilpatrick YES    Proph:  -SQ lovenox  -SCD  -Pepcid    Dispo:   -Full code    Plan of care discussed with Jennifer Marvin, Dr. Jackie Noble.           DESTIN Rock  Critical Care Nurse Practitioner  Spectra # [phone]        A total

## 2019-08-29 NOTE — RESPIRATORY THERAPY NOTE
Received pt from ER, on PRVC 22/500/50%/+5. ABG done, verified and called critical (LAC) result to MD and RN. Titrated FIO2 to 45%. Will continue to monitor pt.

## 2019-08-29 NOTE — PROGRESS NOTES
[facility]  Progress Note     Rocco Gale Patient Status:  Inpatient    1970 MRN [id number]   [facility] 6NE-A Attending Desmond March MD   Hosp Day # 1 PCP Ellen Cool DO     Chief Complaint: Status epilepticus    S: TSH 1.060 08/28/2019       Imaging: Imaging data reviewed in Epic.     Medications:   • piperacillin-tazobactam  3.375 g Intravenous Q8H   • propofol  50 mg Intravenous Once   • propofol  1 mg/kg (Order-Specific) Intravenous Once   • docusate sodium  100

## 2019-08-29 NOTE — RESPIRATORY THERAPY NOTE
Arterial Line inserted in the Right Radial Artery using a 20 GA Arrow kit. Sterile technique used. No complications on insertion. Able to draw and flush with ease. Good waveform observed.

## 2019-08-29 NOTE — CONSULTS
ENDOCRINOLOGY CONSULTATION    Attending physician:  Brice Diaz MD  Consulting physican:  Deena Paredes MD    Admission Date:  8/29/2019  Consultation Date:  8/29/2019      Reason for consultation: Mgmt of Type 1 DM    Chief Complaint:  Admitted Unspecified essential hypertension          Past Surgical History:   Procedure Laterality Date   • COLONOSCOPY N/A 12/4/2018    Performed by Shobha Ross MD at [facility] ENDOSCOPY   • ESOPHAGOGASTRODUODENOSCOPY (EGD) N/A 7/6/2019    Performed by Yobany Sullivan Tab TAKE 1 TABLET(100 MG) BY MOUTH EVERY NIGHT Disp: 15 tablet Rfl: 0   TRUEPLUS PEN NEEDLES 31G X 5 MM Does not apply Misc USE AS DIRECTED BY DOCTOR Disp: 1 Box Rfl: 0   ONETOUCH DELICA LANCETS 89Z Does not apply Misc TEST BLOOD GLUCOSE 4 TO 5 TIMES DAILY mL ADD-vantage 3.375 g Intravenous Q8H   Valproate Sodium (DEPACON) 750 mg in sodium chloride 0.9% 100 mL IVPB 750 mg Intravenous Q8H   levETIRAcetam (KEPPRA) 1,500 mg in sodium chloride 0.9% 100 mL IVPB 1,500 mg Intravenous Q12H   [COMPLETED] levETIRAceta % injection 50 mL 50 mL Intravenous Once   [COMPLETED] dextrose 5 %-0.45 % NaCl infusion  Intravenous Once   propofol (DIPRIVAN) injection 50 mg 50 mg Intravenous Once   propofol (DIPRIVAN) injection 70.4 mg 1 mg/kg (Order-Specific) Intravenous Once   fent (LEVOPHED) 4 mg/250 ml premix infusion 0.5-30 mcg/min Intravenous Continuous   [COMPLETED] sodium chloride 0.9% IV bolus 1,000 mL 1,000 mL Intravenous Once   [COMPLETED] norepinephrine (LEVOPHED) 4 mg/250 ml premix infusion      phenylephrine in NaCl (DANNY- rhythm  Lungs:  Clear to ausculation  Abd:  soft, nontender  Exts:  No lower extremity edema; 2+ dorsalis pedis and posterior tibialis pulses  Neuro:  Reflexes: 2+ biceps.   Skin:  No sores noted on feet bilaterally        Laboratory Data      Component suggestive of intentional overdose. 3. Long term insulin use       · Adjust regimen as follows - starting IV insulin for basal as he has Type 1 DM and glucoses were rising so want to avoid DKA:       IV insulin per post-op cardiac protocol.  This protocol

## 2019-08-29 NOTE — PLAN OF CARE
Assumed care of patient at [address] [place] [address]. Patient intubated and sedated on propofol. Upon initial assessment Patient had rhythmic eye twitching, and myoclonic head movements. Neuro APN notified and at bedside. Ativan given.  Continuous EEG placed, neuro APN spoke wi document and report duration and description of seizure to MD/LIP  - If seizure occurs, turn patient to side and suction secretions as needed  - Reorient patient post seizure  - Seizure pads on all 4 side rails  - Instruct patient/family to notify RN of an

## 2019-08-30 NOTE — PLAN OF CARE
Assumed care of patient at [address] [place] [address]. Patient vented and sedated on Propofol and versed gtt per orders. Cont. EEG. Pupils reactive per pupillometer, otherwise unresponsive. Afebrile overnight. NSR on monitor. Jamel gtt. to maintain sbp  per orders.  Neuro AP activity  Description  INTERVENTIONS:  - Maintain airway, patient safety  and administer oxygen as ordered  - Monitor patient for seizure activity, document and report duration and description of seizure to MD/LIP  - If seizure occurs, turn patient to side

## 2019-08-30 NOTE — PROCEDURES
VEEG ELECTROENCEPHALOGRAM REPORT    Patient Name: Marilin Stoddrad   Ordering Physician: Brendon Crocker MD     LTM  DAY 1  START DATE & TIME: 19 @   END DATE & TIME: 19 @   TOTAL HRS OF RECORDIN HRS   # OF EVENTS: NONE STATUS    D noted on 8/30 from ~ 0730 to ~ 1330. Later in the recording bursts of epileptiform activity occur ~ 1-2 times per hour    PUSH BUTTON EVENTS:  There were No button events that correlate with the patient’s documentation.   The patient did not  have clinical

## 2019-08-30 NOTE — RESPIRATORY THERAPY NOTE
Pt on full support on ventilator 22 500 40% +5. abg done this am. No changes made and no weaning at this time. Will continue to closely monitor.

## 2019-08-30 NOTE — PROGRESS NOTES
[address] [place] [address] Patient Status:  Inpatient    1970 MRN [id number]   [facility] 6NE-A Attending Damaris Hernandez MD   [facility] Day # 2 PCP Deysi Nance DO     Critical Care Progress Note     Date of Admission: 2019 LORazepam (ATIVAN) injection 3 mg, 3 mg, Intravenous, Q15 Min PRN  •  LORazepam (ATIVAN) injection 4 mg, 4 mg, Intravenous, Q10 Min PRN  •  propofol (DIPRIVAN) injection 50 mg, 50 mg, Intravenous, Once  •  propofol (DIPRIVAN) injection 70.4 mg, 1 mg/kg (Or Pulse 76   Temp 97.3 °F (36.3 °C)   Resp 22   Wt 178 lb 2.1 oz (80.8 kg)   SpO2 100%   BMI 28.75 kg/m²      Ventilator Settings: PRVC 22/500/40%/+5      Wt Readings from Last 3 Encounters:  08/30/19 : 178 lb 2.1 oz (80.8 kg)  07/30/19 : 160 lb (72.6 kg)  0 ASSESSMENT/PLAN:  1. Acute resp failure: secondary to status epilepticus.  cxr with ? Developing infiltrates, now febrile as well  - continue mechanical vent until mental status improves  - continue zosyn for possible aspiration pneumonia (day 2)  2.  Sta

## 2019-08-30 NOTE — PAYOR COMM NOTE
--------------  CONTINUED STAY REVIEW    Payor: Obie Tinsley #:  [id number]  Authorization Number: [id number]    Admit date: 8/28/19  Admit time: [facility]    Admitting Physician: Jerald Mace MD  Attending Physician: 8/30/2019 0823 Given 20 mg Intravenous Hustledustin Huston RN    8/29/2019 2025 Given 20 mg Intravenous Artis Hernandez RN      insulin detemir (LEVEMIR) 100 UNIT/ML flextouch 6 Units     Date Action Dose Route User    8/30/2019 0835 Given 6 Units Subcutan levETIRAcetam (KEPPRA) 1,500 mg in sodium chloride 0.9% 100 mL IVPB     Date Action Dose Route User    8/30/2019 0413 New Bag 1500 mg Intravenous Artis Hernandez RN    8/29/2019 1628 New Bag 1500 mg Intravenous Stephany Ross, RN      Magnesium Sulfate 8/30/2019 0148 Rate/Dose Change 200 mcg/min Intravenous Matty Givens RN    8/30/2019 0045 Rate/Dose Change 190 mcg/min Intravenous Matty Givens RN    8/30/2019 0000 Rate/Dose Verify 180 mcg/min Intravenous Matty Givens RN    8/29/2019 2300 8/30/2019 0657 New Bag 100 mcg/kg/min × 72.6 kg Intravenous Burnis Butt, RN    8/30/2019 0424 New Bag 100 mcg/kg/min × 72.6 kg Intravenous Burnis Butt, RN    8/30/2019 0148 New Bag 100 mcg/kg/min × 72.6 kg Intravenous Burnis Butt, RN    8/3 Wt Readings from Last 3 Encounters:  08/30/19 : 178 lb 2.1 oz (80.8 kg)  07/30/19 : 160 lb (72.6 kg)  07/29/19 : 160 lb (72.6 kg)     Constitutional Vital signs in last 24 hours:/82   Pulse 76   Temp 97.3 °F (36.3 °C)   Resp 22   Wt 178 lb 2.1 oz (80 0559 08/30/19  0657   PGLU 50* 201* 16* 16* 129* 37* 83 73 50* 94 98 94 78 55* 86 166* 148* 153* 145* 132* 134* 142* 147* 151* 153* 162* 145* 148* 141* 123* 107* 95 87 91 97 96 98 100* 105* 101* 103* 105* 122* 115* 114* 120*         Meds:      Current Faci propofol (DIPRIVAN) injection 70.4 mg 1 mg/kg (Order-Specific) Intravenous Once   fentaNYL citrate (SUBLIMAZE) 0.05 MG/ML injection 25 mcg 25 mcg Intravenous Q30 Min PRN   Or         fentaNYL citrate (SUBLIMAZE) 0.05 MG/ML injection 50 mcg 50 mcg Intraveno · Will transition from IV to SQ insulin.   Outpt is on levemir 10 bid and admelog with meals (unknown dose) and correction  · Start levemir 6 units bid, DC insulin gtt  · Start novolog q4h SS 1:30 >130   · If starts TFs, will schedule TF dose  · If ready to •  norepinephrine (LEVOPHED) 4 mg/250 ml premix infusion, 0.5-30 mcg/min, Intravenous, Continuous  •  sodium chloride 0.9% IV bolus 1,000 mL, 1,000 mL, Intravenous, Once  •  norepinephrine (LEVOPHED) 4 mg/250 ml premix infusion, , ,   •  insulin detemir (L •  magnesium hydroxide (MILK OF MAGNESIA) 400 MG/5ML suspension 30 mL, 30 mL, Oral, Daily PRN  •  bisacodyl (DULCOLAX) rectal suppository 10 mg, 10 mg, Rectal, Daily PRN  •  FLEET ENEMA (FLEET) 7-19 GM/118ML enema 133 mL, 1 enema, Rectal, Once PRN  •  Chlo                         CWPFH: ETT in place                          Lungs: Clear to auscultation bilaterally, no wheezes or crackles                           Chest wall: No tenderness or deformity.                           MRQKZ: POKCOCQ rate and rhythm, - hemodynamic support  - consider abd imaging if worsens     5. Dm: hypoglycemic on arrival  - accuchecks, correction factor  - endocrine managing   6. FEN: NPO, IVFs  7. Prophy: lmwh  8. Dispo: full code. ICu. Critically ill.  D/w daughter at bedside.     Intake/Output Summary (Last 24 hours) at 8/30/2019 1004  Last data filed at 8/30/2019 2019      Gross per 24 hour   Intake 7834 ml   Output 1315 ml   Net 6519 ml      Current Meds:     Current Facility-Administered Medications:  phenylephrine HCl (DANNY-SYNE fentaNYL citrate (SUBLIMAZE) 0.05 MG/ML injection 50 mcg 50 mcg Intravenous Q30 Min PRN   docusate sodium (COLACE) liquid 100 mg 100 mg Oral BID   PEG 3350 (MIRALAX) powder packet 17 g 17 g Oral Daily PRN   magnesium hydroxide (MILK OF MAGNESIA) 400 MG/5ML CONCLUSION:  1. No radiographic evidence for a bowel obstruction.    Dictated by: Lucina Lay MD on 8/29/2019 at 11:50     Approved by: Lucina Lay MD             Xr Chest Ap Portable  (cpt=71045)     Result Date: 8/30/2019  CONCLUSION:  Small left-sided pleu Electronically signed by Neva Tolliver MD at 8/30/2019 10:09 AM       ED to Hosp-Admission (Current) on 8/28/2019          Detailed Report      Chart Review: Note Routing History     Routing history could not be found for this note.  This is because the

## 2019-08-30 NOTE — PLAN OF CARE
Patient is intubated and sedated. If he is extubated this weekend, the on call psychiatrist will see him. If he is extubated on Monday, I can see him then.        Dr. Lopez Bene

## 2019-08-30 NOTE — DIETARY NOTE
NUTRITION INITIAL ASSESSMENT    Pt is at moderate nutrition risk. Pt does not meet malnutrition criteria.     NUTRITION DIAGNOSIS/PROBLEM:    Inadequate oral intake related to the inability to consume adequate energy as evidenced by pt is currently intubate cnicu for further monitoring and eeg revealed persistent subclinical status epilepticus thus propofol increased and started on high dose versed gtt. ANTHROPOMETRICS:  Ht:  167.6 cm (5'6\")  Wt: 80.8 kg (178 lb 2.1 oz).  This is 125 % of IBW  BMI: Body m

## 2019-08-30 NOTE — PROGRESS NOTES
[facility]  Progress Note    Binu Jones Patient Status:  Inpatient    1970 MRN [id number]   [facility] 6NE-A Attending Jaz Kulkarni MD   Hosp Day # 2 PCP Le Eugene DO       SUBJECTIVE:  No acute events overnight.  Rem 08/29/19  1403 08/29/19  1513 08/29/19  1611 08/29/19  1755 08/29/19  1858 08/29/19  2013 08/29/19  2104 08/29/19  2159 08/29/19  2259 08/30/19  0006 08/30/19  0101 08/30/19  0158 08/30/19  0258 08/30/19  0404 08/30/19  0505 08/30/19  0559 08/30/19  4166 (ATIVAN) injection 3 mg 3 mg Intravenous Q15 Min PRN   LORazepam (ATIVAN) injection 4 mg 4 mg Intravenous Q10 Min PRN   propofol (DIPRIVAN) injection 50 mg 50 mg Intravenous Once   propofol (DIPRIVAN) injection 70.4 mg 1 mg/kg (Order-Specific) Intravenous admission for hypoglycemia and seizures: circumstances suggestive of intentional overdose. 3. Long term insulin use       · Will transition from IV to SQ insulin.   Outpt is on levemir 10 bid and admelog with meals (unknown dose) and correction  · Start le

## 2019-08-30 NOTE — PROCEDURES
[address]      PATIENT'S NAME: Michelle Woody   ATTENDING PHYSICIAN: Martin Garcia M.D.   REQUESTING PHYSICIAN: Sabiha Luz MD   PATIENT ACCOUNT #: [de-identified] LOCATION: [address] [place] [address] frontocentral head regions throughout recording toward the night. At 8:15 p.m., reportedly the patient was given Ativan IV 4 mg.   The subsequent hour, the epileptiform amplitude was decreased with diffuse slowing in bilateral hemispheres of low amplitude throughout the recording, continued status epilepticus was noted. In the last 15 or 20 minutes of recording, a burst suppression pattern appeared to start.   This result was communicated with the requesting physician, and the next 24-hour recording was con

## 2019-08-30 NOTE — PROGRESS NOTES
[facility]  Neurocritical Care Progress Note     Lolita La Patient Status:  Inpatient    1970 MRN [id number]   [facility] 6NE-A Attending Chanelle Lane MD   [facility] Day # 2 PCP DO Yeimi Gutierrez STATUS EPILEPTICUS 14.52 mg 0.2 mg/kg Intravenous PRN   Midazolam HCl (VERSED) 250 mg in sodium chloride 0.9% 250 mL infusion 0.2-2 mg/kg/hr Intravenous Continuous   Piperacillin Sod-Tazobactam So (ZOSYN) 3.375 g in dextrose 5 % 100 mL ADD-vantage 3.375 g Glucose-Vitamin C (DEX-4) chewable tab 8 tablet 8 tablet Oral Q15 Min PRN   propofol (DIPRIVAN) infusion 5-100 mcg/kg/min Intravenous Continuous   dextrose 10% sodium chloride 0.9% 1000 ml  Intravenous Continuous   famoTIDine (PEPCID) injection 20 mg 20 possible intentional insulin overdose. EEG c/w burst suppression pattern, thus will cont keppra, vpa, and vimpat, cont high dose propofol and versed gtt with goal burst suppression x 24hrs before slowly weaning sedation later today.  Cont vEEG, ciwa, mvi, t

## 2019-08-30 NOTE — PROGRESS NOTES
[facility]  Progress Note     Liam Armenta Patient Status:  Inpatient    1970 MRN [id number]   [facility] 6NE-A Attending Janeth Dejesus MD   Hosp Day # 2 PCP Rodolfo Suarez DO     Chief Complaint: Status epilepticus    S: No results for input(s): TROP, CK in the last 168 hours. Imaging: Imaging data reviewed in Epic.     Medications:   • potassium chloride 40mEq IVPB (peripheral line)  40 mEq Intravenous Once   • sodium chloride 0.9%  1,000 mL Intravenous Once

## 2019-08-30 NOTE — RESPIRATORY THERAPY NOTE
Received pt on vent with settings:prvc/22/500/+5/40%, BS clear bilateral, sx et nothing. Will continue to monitor pt closely.

## 2019-08-31 NOTE — PROGRESS NOTES
[facility]  Progress Note     Bruno Ferguson Patient Status:  Inpatient    1970 MRN [id number]   [facility] 6NE-A Attending Lex Rothman MD   Hosp Day # 3 PCP Belinda Manjarrez DO     Chief Complaint: Status epilepticus    S: BILT 0.2 0.2 0.4  --   --    TP 8.2 5.8* 5.4*  --   --        Estimated Creatinine Clearance: 72.3 mL/min (based on SCr of 1.14 mg/dL).     Recent Labs   Lab 08/28/19  1820   PTP 13.6   INR 1.00       No results for input(s): TROP, CK in the last 168 hour to: TBD    Plan of care discussed Wife.       Jelly Pagan MD

## 2019-08-31 NOTE — PROGRESS NOTES
Assessment and Objective  \"Progressing\"  INTERVENTIONS:  - Assess for changes in respiratory status  - Assess for changes in mentation and behavior  - Position to facilitate oxygenation and minimize respiratory effort  - Oxygen supplementation based on o Placed By: ED physician   Secured at (cm) 24 cm   Suctioned?  N   Measured From Lips   Secured Location Left   Secured by Commercial tube fernandez   Site Condition Dry   Req'd equipment at bedside Bag mask     Notes:   -ABG drawn this morning with critical La

## 2019-08-31 NOTE — PLAN OF CARE
Assumed care of pt. At [address]. Pt. Intubated and sedated on propofol and versed gtts. Continuous EEG in place. Kilpatrick. Monitoring blood glucose Q4H. Jamel and levo gtt infusing to keep SBP . Q1H pupilometer checks, see flowsheets.  Per Dr. Jackie Noble - continu ordered  - Monitor patient for seizure activity, document and report duration and description of seizure to MD/LIP  - If seizure occurs, turn patient to side and suction secretions as needed  - Reorient patient post seizure  - Seizure pads on all 4 side ra

## 2019-08-31 NOTE — PROGRESS NOTES
[facility]  Progress Note    Negin Goldman Patient Status:  Inpatient    1970 MRN [id number]   [facility] 6NE-A Attending Sintia Mtez MD   Hosp Day # 3 PCP Le Eugene DO       SUBJECTIVE:  No acute events overnight.  Rem 08/30/19  0006 08/30/19  0101 08/30/19  0158 08/30/19  0258 08/30/19  0404 08/30/19  0505 08/30/19  0559 08/30/19  0657   PGLU 50* 201* 16* 16* 129* 37* 83 73 50* 94 98 94 78 55* 86 166* 148* 153* 145* 132* 134* 142* 147* 151* 153* 162* 145* 148* 141* 123* (SUBLIMAZE) 0.05 MG/ML injection 25 mcg 25 mcg Intravenous Q30 Min PRN   Or      fentaNYL citrate (SUBLIMAZE) 0.05 MG/ML injection 50 mcg 50 mcg Intravenous Q30 Min PRN   docusate sodium (COLACE) liquid 100 mg 100 mg Oral BID   PEG 3350 (MIRALAX) powder pa yesterday, will improved with stopping D10   · Continue levemir 6 units bid  · Change to  novolog q4h SS 1:25>140 q4h  · If starts TFs, will schedule TF dose     4. Status epilepticus  5.  ETOH abuse: admitted with elevated levels     · Intubated  · Neuro f

## 2019-08-31 NOTE — PROGRESS NOTES
[facility]  Neurocritical Care Progress Note     Viry Royal Patient Status:  Inpatient    1970 MRN [id number]   [facility] 6NE-A Attending Damaris Hernandez MD   [facility] Day # 3 PCP DO Yeimi Chapa (NOVOLOG) 100 UNIT/ML flexpen 1-20 Units 1-20 Units Subcutaneous Q4H   MIDAZOLAM BOLUS FROM BAG FOR REFRACTORY STATUS EPILEPTICUS 14.52 mg 0.2 mg/kg Intravenous PRN   Midazolam HCl (VERSED) 250 mg in sodium chloride 0.9% 250 mL infusion 0.2-2 mg/kg/hr Intr injection 50 mL 50 mL Intravenous Q15 Min PRN   Or      glucose (DEX4) oral liquid 30 g 30 g Oral Q15 Min PRN   Or      Glucose-Vitamin C (DEX-4) chewable tab 8 tablet 8 tablet Oral Q15 Min PRN   propofol (DIPRIVAN) infusion 5-100 mcg/kg/min Intravenous Co pulm  Renal:  IVFs, monitor BUN/Cr  GI:  NPO  Heme/ID:  Fever and inc wbc- check cultures and cont empiric abx per pulm  Endocrine:  Hypoglycemia- ? intentional insulin overdose, management per endo  DVT Prophylaxis:  SCD’s, Lvx    Goals of the Day: vEEG,

## 2019-08-31 NOTE — PROGRESS NOTES
[address] Patient Status:  Inpatient    1970 MRN [id number]   [facility] 6NE-A Attending Kristie Kelley MD   [address] Day # 3 PCP Monica Santiago DO     Critical Care Progress Note     Date of Admission: 2019 •  LORazepam (ATIVAN) injection 2 mg, 2 mg, Intravenous, Q15 Min PRN  •  LORazepam (ATIVAN) injection 3 mg, 3 mg, Intravenous, Q15 Min PRN  •  LORazepam (ATIVAN) injection 4 mg, 4 mg, Intravenous, Q10 Min PRN  •  propofol (DIPRIVAN) injection 50 mg, 50 mg, /69   Pulse 82   Temp 98.4 °F (36.9 °C)   Resp 22   Wt 186 lb 8.2 oz (84.6 kg)   SpO2 99%   BMI 30.10 kg/m²      Ventilator Settings: PRVC 22/500/40%/+5      Wt Readings from Last 3 Encounters:  08/31/19 : 186 lb 8.2 oz (84.6 kg)  07/30/19 : 160 lb ( 2. Status epilepticus: Unclear trigger. Ct head unremarkable. - management per neuro  3. ? Suicidal ideation: pt expressed some vague notions of this to wife recently  - psych eval with suicide precautions when extubated  4.  Fevers: ? Neurogenic vs possi

## 2019-09-01 NOTE — PROGRESS NOTES
09/01/19 0739   Clinical Encounter Type   Visited With Patient and family together  ( supported nursing staff by being available to assist with communications with family and friends of patient.)   Continue Visiting No  ( remains availab

## 2019-09-01 NOTE — PROGRESS NOTES
Assumed care of pt. At 1. Received pt. On propofol gtt, aniyah gtt, and bicarb gtt. Intubated, sedated, VSS in NSR. Continuous EEG at bedside. Kilpatrick in place, decreased output, and brown tinged. 2200 - levophed gtt started, pt. SBP 70-80's. 0200 - pt.  Beco

## 2019-09-01 NOTE — PROGRESS NOTES
09/01/19 5497   Clinical Encounter Type   Visited With Family  (Wife. adult children and extended relatives at bedside.)   Routine Visit Follow-up   Continue Visiting No   Crisis Visit Death   Patient's Supportive Strategies/Resources  provided

## 2019-09-01 NOTE — PROGRESS NOTES
09/01/19 7796   Clinical Encounter Type   Visited With Family  (Wife. adult children and extended relatives at bedside.)   Routine Visit Follow-up   Continue Visiting No   Crisis Visit Death   Patient's Supportive Strategies/Resources  provided

## 2019-09-01 NOTE — PROGRESS NOTES
09/01/19 0389   Clinical Encounter Type   Visited With Family  (Wife. adult children and extended relatives at bedside.)   Routine Visit Follow-up   Continue Visiting No   Crisis Visit Death   Patient's Supportive Strategies/Resources  provided

## 2019-09-01 NOTE — RESPIRATORY THERAPY NOTE
Received pt on vent with settings:prvc/24/550/+5/30%. BS clear bilateral, BS ET nothing. Will continue to monitor pt closely.

## 2019-09-01 NOTE — PROGRESS NOTES
Patient is a 54year old male in ICU who needs arterial line for monitoring. I was asked to insert one. Patient is post status epilepticus with suicidal attempt while overdosing insulin. Left radial artery arterial line placed using ultrasound 20g.  No comp

## 2019-09-01 NOTE — PLAN OF CARE
Pt vented, unresponsive, continuous EEG, IV: versed, propofol, D10, aniyah, levo infusing. Was able to wean levo and versed off, decreased propofol from 100mcg/min but seizure activity increased and order rcvd to increase propofol to 75mcg/min.  Family at beds

## 2019-09-01 NOTE — PROGRESS NOTES
[address] [place] [address] Patient Status:  Inpatient    1970 MRN [id number]   [facility] 6NE-A Attending Randall Aschoff, MD   Hosp Day # 4 PCP Viri Sebastian DO     Critical Care Progress Note     S: Pt BP low with increasing 08/31/19 0400 114/66 98.1 °F (36.7 °C) — 84 22 97 % —        Ventilator Settings: AC 24// FIO2 30/PEEP 5       Wt Readings from Last 3 Encounters:  08/31/19 : 186 lb 8.2 oz (84.6 kg)  07/30/19 : 160 lb (72.6 kg)  07/29/19 : 160 lb (72.6 kg)     I/O with the family decision made to stop CPR and end code. Pt pronounced. All consults called by RN to inform of death.

## 2019-09-02 LAB
7-AMINOCLONAZEPAM, URINE: <5 NG/ML
ALPHA-HYDROXYALPRAZOLAM, URINE: <5 NG/ML
ALPHA-HYDROXYMIDAZOLAM, URINE: >4000 NG/ML
ALPRAZOLAM, URINE: <5 NG/ML
CHLORDIAZEPOXIDE, URINE: <20 NG/ML
CLONAZEPAM, URINE: <5 NG/ML
DIAZEPAM, URINE: <20 NG/ML
LORAZEPAM, URINE: >4000 NG/ML
MIDAZOLAM, URINE: 281 NG/ML
NORDIAZEPAM, URINE: <20 NG/ML
OXAZEPAM, URINE: <20 NG/ML
TEMAZEPAM, URINE: <20 NG/ML

## 2019-09-02 NOTE — PROCEDURES
VEEG ELECTROENCEPHALOGRAM REPORT    Patient Name: Casey Enrique   Ordering Physician: Breanne Aguirre MD     LTM  DAY 1  START DATE & TIME: 19 @ 190  END DATE & TIME: 19 @   TOTAL HRS OF RECORDIN HRS   # OF EVENTS: NONE STATUS    D activity occurring on average ~ 4 times per hour ranging 10-40 seconds in duration. PUSH BUTTON EVENTS:  There were No button events that correlate with the patient’s documentation.   The patient did not  have clinical episodes suspicious for seizures d

## 2019-09-02 NOTE — DISCHARGE SUMMARY
[facility]  DISCHARGE SUMMARY     Dayne Li Patient Status:  Inpatient    1970 MRN [id number]   [facility] 6NE-A Attending No att. providers found   Hosp Day # 4 PCP Caroline Uribe DO     Date of Admission: 2019  D • None    Incidental or significant findings and recommendations (brief descriptions):  • None    Consultants:  • [facility], Neuro    Blaine Natarajan MD    Time spent:  > 30 minutes

## 2019-09-02 NOTE — PROCEDURES
VEEG ELECTROENCEPHALOGRAM REPORT    Patient Name: Kal Gil   Ordering Physician: Jacque Coates MD     LTM  DAY 1  START DATE & TIME: 19 @ 1900  END DATE & TIME: 19 @ 0500  TOTAL HRS OF RECORDIN HRS   # OF EVENTS: NONE STATUS    DX: activity occurring on average ~ 4 times per hour ranging 10-40 seconds in duration. In the latter portions of the record record is largely suppressed with no obvious bursts of activity noted.       PUSH BUTTON EVENTS:  There were No button events that co

## 2019-09-03 NOTE — PAYOR COMM NOTE
--------------  DISCHARGE REVIEW    Payor: Obie Alvina #:  [id number]  Authorization Number: [id number]    Admit date: 8/28/19  Admit time:  1758  Discharge Date: 9/1/2019  9:34 AM     Admitting Physician: Lake Schaffer,

## 2019-09-05 NOTE — CDS QUERY
Uncertain Diagnosis  Luan Gonzales  Dear Dr. Veronique Toth:  Clinical information (provided below) indicates an unknown status of a documented diagnosis.  For accurate ICD-10-CM code assignment to reflect severity of illness and risk of

## 2019-09-09 ENCOUNTER — MED REC SCAN ONLY (OUTPATIENT)
Dept: FAMILY MEDICINE CLINIC | Facility: CLINIC | Age: 49
End: 2019-09-09

## 2019-10-03 ENCOUNTER — TELEPHONE (OUTPATIENT)
Dept: FAMILY MEDICINE CLINIC | Facility: CLINIC | Age: 49
End: 2019-10-03

## 2019-10-03 NOTE — TELEPHONE ENCOUNTER
Medical Records Request received from [facility] for records from 6/1/17 to present. Records to be sent to:     [place] [address], [place]  [address], [place]    Release sent to [employer] on 10/3/19.   Release also

## 2020-02-13 NOTE — PLAN OF CARE
Mom returned call, aware.  Call transferred to schedule nurse visit.   Received drowsy and oriented 2-3; a little bit off with date; CIWA 9-3, Ativan given per protocol. [place] vest in place. Frequent orientation done. On RA maintaining sats. SR on the monitor. T 100.6. Cooling measures rendered. Tylenol PRN given.  Incontinent

## 2020-04-08 NOTE — ED NOTES
Patient Education        Visita de control para personas de 18 a 48 años: Instrucciones de cuidado  Well Visit, Ages 25 to 48: Care Instructions  Instrucciones de cuidado    Los exámenes físicos pueden ayudarle a mantenerse saludable. Tang médico neely revisado tang estado general de danica y podría haberle dado algunos consejos para cuidarse. También podría haberle recomendado otros exámenes. En tang hogar, usted puede ayudar a prevenir enfermedades si come de manera saludable, hace ejercicio con regularidad y sigue otras recomendaciones. La atención de seguimiento es lacy parte clave de tang tratamiento y seguridad. Asegúrese de hacer y acudir a todas las citas, y llame a tang médico si está teniendo problemas. También es lacy buena idea saber los resultados de summer exámenes y mantener lacy lista de los medicamentos que lucy. ¿Cómo puede cuidarse en el hogar? · Alcance un peso saludable y [place]. [place] disminuirá el riesgo de tener [employer], tales oneida obesidad, diabetes, enfermedad cardíaca y presión arterial melvi. · Yosef actividad física por lo menos 30 minutos la mayoría de los días de la [place]. Caminar es lacy buena opción. Maurice Rossana desee hacer otras actividades, oneida correr, nadar, [employer], o jugar al tenis u otros deportes de equipo. Discuta con tang médico cualquier cambio que quiera introducir en tang programa de ejercicios. · No fume ni permita que otros fumen cerca de usted. Si necesita ayuda para dejar de fumar, hable con tang médico sobre programas y medicamentos para dejar de fumar. Pueden aumentar summer probabilidades de dejar el hábito para siempre. · Consulte con tang médico si presenta factores de riesgo de infecciones de transmisión sexual (STI, por summer siglas en inglés). Tener lacy fatoumata ga sexual (que no tenga STI y que no tenga relaciones sexuales con nadie más) es lacy buena manera de evitar estas infecciones.   · Utilice métodos anticonceptivos si no desea tener hijos en [facility] Patient endorsed by Dr. Que Kent to myself, patient presented with alcohol intoxication with nausea vomiting and abdominal pain. On my evaluation patient does have upper abdominal discomfort, CT was performed which did reveal acute pancreatitis.   Patient ha momento. Consulte con tang médico acerca de las opciones disponibles más adecuadas para usted. · Protéjase la piel del exceso de sol. [address] 10 a.m. y las 4 p.m., permanezca a la shree o Lawayne Brooking con prendas de vestir y un sombrero de ala ancha. Use gafas de sol que bloqueen los galina ultravioleta. Póngase un protector solar de amplio espectro (SPF 30 o superior) en la piel expuesta, incluso cuando esté nublado. · Acuda al dentista ELISABETH [facility] veces al año para hacerse chequeos y limpiezas dentales. · En el automóvil, use el cinturón de seguridad. Siga las recomendaciones de tang médico acerca de cuándo hacerse determinados exámenes. Estas pruebas pueden detectar problemas a tiempo. Para ambos sexos  · Colesterol. Hágase un análisis de la grasa (colesterol) en la sole después de los 21 años de [place]. Tang médico le indicará con qué frecuencia debe [employer] análisis según tang edad, summer antecedentes familiares u otros factores que pueden aumentar el riesgo de enfermedad cardíaca. · Presión arterial. Hágase yo la presión arterial landy lacy visita de rutina al médico. Tang médico le dirá con qué frecuencia debe revisarse la presión arterial según tang edad, summer niveles de presión arterial y otros factores. · Visión. Hable con tang médico acerca de la frecuencia con que debe hacerse lacy prueba de glaucoma. · Diabetes. Pregúntele a tang médico si debería hacerse pruebas para la diabetes. · Cáncer de colon. Tang riesgo de cáncer colorrectal aumenta a medida que envejece. Algunos especialistas dicen que los adultos deberían comenzar a hacerse pruebas de detección regulares a los 48 años y dejar de hacérselas a los 76 años. Otros dicen que hay que comenzar antes de los 48 años o continuar después de los 77 [address] [facility]. Hable con tang médico acerca de tang riesgo y de cuándo comenzar y dejar de hacerse pruebas de detección. Para las mujeres  · Examen de [facility] y [place].  Pregúntele a tang médico cuándo debe postsurgical changes in the anterior abdominal wall. summer alimentos favoritos y [employer] más propenso a fallar. · Empiece lentamente y cambie summer hábitos gradualmente. Pruebe algo de lo siguiente:  ? Consuma pan integral en lugar de pan godfrey.  ? Use [employer] o semidescremada en lugar de SACHA Gaxiola. ? Coma arroz integral en lugar de arroz godfrey, y pasta de luna integral en lugar de pasta de harina nicole. ? Pruebe yogur y quesos con Miah Massey. ? Aumente las frutas y las verduras en las comidas y cómalas oneida refrigerio. ? Agregue rishabh, tomate, pepino y cebolla a summer sándwiches. ? Tony Shape al yogur y a los cereales. Disfrute de la comida  · Usted aún puede comer summer alimentos preferidos. Jenita Ag solo necesite comerlos en milla cantidad. Si summer alimentos preferidos son ricos en grasa, sal y azúcar, reduzca la frecuencia con que los coma, marivel no los descarte del todo. · Consuma alimentos muy variados. Elija alimentos saludables cuando coma fuera de tang casa  · El tipo de restaurante que elige puede ayudarle a optar por alimentos saludables. Hoy en día, incluso las irene de comida rápida ofrecen más opciones de alimentos saludables o con bajo contenido de [place]. · Elija porciones pequeñas o llévese a casa la mitad de tang comida. · Cuando coma fuera, pruebe:  ? Pizza vegetariana con pan de luna integral o nhung a la lorenza (en lugar de salchicha o pepperoni). ? Pasta con verduras asadas, nhung a las brasas o [address], en lugar de salsa de crema. ? Brandi tortilla (\"wrap\") rellena de verduras o de nhung a las brasas. ? Comidas cocidas a la lorenza o hervidas, en lugar de alimentos fritos o empanizados. Yosef que las opciones saludables le resulten fáciles  · Compre verduras y frutas frescas envasadas, lavadas y listas para comer, oneida zanahorias bebé, mezclas de [place], y brócoli o coliflor cortados o desmenuzados. · Compre frutas envasadas y precortadas, oneida melón o cowan (ananá).   · Seleccione jugos de 100% de fruta o verduras en vez de sodas. Limite el jugo a 4 a 6 onzas (½ a ¾ taza) al día. · Mezcle yogur con bajo contenido de [place], jugo de frutas, y fruta enlatada o congelada para preparar un licuado para el desayuno o el refrigerio. ¿Dónde puede encontrar más información en inglés? Raul Funk a https://chpepiceweb.health-Citybot. org e ingrese a tang cuenta de MyChart. Lemon Beulah Q621 en el Arabella Redder \"Search Health Information\" para más información (en inglés) sobre \"Cómo comer alimentos saludables: Instrucciones de cuidado. \"     Si no tiene lacy cuenta, irina raffy en el enlace \"Sign Up Now\". Revisado: 21 agosto, 2019Versión del contenido: 12.4  © 2997-9706 Healthwise, Incorporated. Las instrucciones de cuidado fueron adaptadas bajo licencia por [facility] CARE ([facility]). Si usted tiene [place] afección médica o sobre estas instrucciones, siempre pregunte a tang profesional de danica. Healthwise, Incorporated niega toda garantía o responsabilidad por tang uso de esta información.   Return to care in 6 weeks for physical exam and blood work  Encouraged to increase physical activity

## 2020-04-13 ENCOUNTER — TELEPHONE (OUTPATIENT)
Dept: SURGERY | Facility: CLINIC | Age: 50
End: 2020-04-13

## 2020-04-16 ENCOUNTER — TELEPHONE (OUTPATIENT)
Dept: FAMILY MEDICINE CLINIC | Facility: CLINIC | Age: 50
End: 2020-04-16

## 2020-04-16 NOTE — TELEPHONE ENCOUNTER
Medical Records Request received from Nia Walters for records from 01/01/2015 to present - specifics listed on request.     Records to be sent to:   [facility]  [address] [place] [address], [address] [place] [address], [address] [place] [address]    Release sent to [employer] on

## 2021-03-11 NOTE — PROGRESS NOTES
----- Message from MINNIE Malin Pt states he unable to talk as he is at work right now. Pt states he will call back on Monday to schedule a HFU appt.

## 2022-03-01 NOTE — TELEPHONE ENCOUNTER
LEVETIRACETAM 750MG TABLETS    Non protocol medication. Please see pended medications. Please sign if appropriate. Thank you    His last OV was on 07/10/2019.
tympanic

## 2022-04-26 NOTE — PROGRESS NOTES
Critical Care Progress Note     Assessment / Plan:  1. Seizure disorder - concern for medication noncompliance  - per neuro  2. Etoh abuse  - CIWA  - MVI, thiamine, folic acid  3. DM  - Levemir and Novolog  4. Felon  - Unasyn  5.  Elevated LFTs - likely due Adbry Pregnancy And Lactation Text: It is unknown if this medication will adversely affect pregnancy or breast feeding.

## 2022-07-27 NOTE — PROGRESS NOTES
DMG Pulm/CC  -no further pulm/cc issues. Will sign off. Please call with questions.   Nany Schaffer MD PHYSICAL THERAPY Daily Note and PM  (Link to Caseload Tracking: PT Visit Days : 2  Time In/Out PT Charge Capture  Rehab Caseload Tracker  Orders    Nir Parekh is a 60 y.o. male   PRIMARY DIAGNOSIS: Acute kidney injury (BEN) with acute tubular necrosis (ATN) (HCC)  Hypoxemia [R09.02]  Ureteral obstruction, right [N13.5]  Acute kidney injury ([address] [N17.9]  Poisoning by warfarin sodium, accidental or unintentional, initial encounter [T45.511A]  Urinary tract infection with hematuria, site unspecified [N39.0, R31.9]  Acute kidney injury (BEN) with acute tubular necrosis (ATN) ([address] [N17.0]  Procedure(s) (LRB):  CYSTOSCOPY,RIGHT URETEROSCOPY,RIGHT RETROGRADE PYELOGRAM (Right)  1 Day Post-Op  Inpatient: Payor: Tyler Pathak / Plan: [email or web address] DUAL COMPLETE / Product Type: *No Product type* /     ASSESSMENT:     REHAB RECOMMENDATIONS:   Recommendation to date pending progress:  Setting:  Outpatient Therapy (pt declined)    Equipment:    None     ASSESSMENT:  Mr. Azam Chavez presents resting in chair, agreeable to therapy. Pt did not have cystoscopy yesterday due to elevated INR. Pt with less antalgic gait pattern today with ambulation, but still dyspnea with exertion. Ambulation x420 ft in room and hallway today. Decent progress towards goals which remain ongoing and appropriate. Pt presents as functioning below his baseline, with deficits in mobility including gait, balance, and activity tolerance. Pt will benefit from skilled therapy services to address stated deficits to promote return to highest level of function, independence, and safety. Will continue therapy efforts.      SUBJECTIVE:   Mr. Azam Chavez states, Kady Hatch are going to look at my bladder later today\"     Social/Functional Lives With: Alone  Type of Home: House  Home Layout: One level  Home Access: Stairs to enter without rails  Entrance Stairs - Number of Steps: 3  Bathroom Shower/Tub: Tub/Shower unit  Bathroom Toilet: Standard  Bathroom Accessibility: Accessible  Home Equipment: [address] Help From: Friend(s), Family  ADL Assistance: Independent  Homemaking Assistance: Independent  Homemaking Responsibilities: Yes  Ambulation Assistance: Independent  Transfer Assistance: Independent  Active : Yes  Mode of Transportation: Car  OBJECTIVE:     PAIN: Teretha Backers / O2: Bill Bautistas / Tian Hastings / Sakshi Hallmark:   Pre Treatment:   Pain Assessment: None - Denies Pain      Post Treatment: 0/10 Vitals        Oxygen    None    RESTRICTIONS/PRECAUTIONS:        MOBILITY: I Mod I S SBA CGA Min Mod Max Total  NT x2 Comments:   Bed Mobility    Rolling [] [] [] [x] [] [] [] [] [] [] []    Supine to Sit [] [] [] [x] [] [] [] [] [] [] []    Scooting [] [] [] [x] [] [] [] [] [] [] []    Sit to Supine [] [] [] [x] [] [] [] [] [] [] []    Transfers    Sit to Stand [] [] [] [x] [] [] [] [] [] [] []    Bed to Chair [] [] [] [x] [] [] [] [] [] [] []    Stand to Sit [] [] [] [x] [] [] [] [] [] [] []     [] [] [] [] [] [] [] [] [] [] []    I=Independent, Mod I=Modified Independent, S=Supervision, SBA=Standby Assistance, CGA=Contact Guard Assistance,   Min=Minimal Assistance, Mod=Moderate Assistance, Max=Maximal Assistance, Total=Total Assistance, NT=Not Tested    BALANCE: Good Fair+ Fair Fair- Poor NT Comments   Sitting Static [x] [] [] [] [] []    Sitting Dynamic [x] [] [] [] [] []              Standing Static [] [x] [] [] [] []    Standing Dynamic [] [x] [] [] [] []      GAIT: I Mod I S SBA CGA Min Mod Max Total  NT x2 Comments:   Level of Assistance [] [] [] [x] [x] [] [] [] [] [] []    Distance 420 feet    DME None    Gait Quality Antalgic    Weightbearing Status      Stairs      I=Independent, Mod I=Modified Independent, S=Supervision, SBA=Standby Assistance, CGA=Contact Guard Assistance,   Min=Minimal Assistance, Mod=Moderate Assistance, Max=Maximal Assistance, Total=Total Assistance, NT=Not Tested    PLAN:   ACUTE PHYSICAL THERAPY GOALS:   (Developed with and agreed upon by

## 2022-11-23 NOTE — TELEPHONE ENCOUNTER
FAX #: TO FAX ALL DOCUMENTS TO [facility], [place] [phone].     Noted below. Patient needs to find new PCP to assume care.

## 2022-12-09 NOTE — PROGRESS NOTES
-- DO NOT REPLY / DO NOT REPLY ALL --  -- Message is from Engagement Center Operations (ECO) --    ONLY TO BE USED WITHIN A REFILL MEDICATION ENCOUNTER    Med Refill  Is the patient currently having any symptoms?: No/Non-Emergent symptoms    Name of medication requested: See pended med    Has patient contacted the pharmacy? Yes    Is this the first request for the medication in the last 48 hours?: Yes    Patient is requesting a medication refill - medication is on active medication list    Patient is currently OUT of the requested medication - sent as HIGH priority      Full name of the provider who ordered the medication: MAGDIEL ALFRED    [facility] site name / Account # for provider: [address] [facility] Pharm[facility]: Pharmacy  [id number] [place] [address]    Patient confirmed the above pharmacy as correct?  Yes      Caller Information       Type Contact Phone/Fax    12/09/2022 12:22 PM CST Phone (Incoming) NICHOL BELLE (Emergency Contact) [phone]          Alternative phone number: none    Can a detailed message be left?: Yes    Patient is completely out of medication: Verify if patient is currently experiencing symptoms. If patient is symptomatic, proceed with front end triage instead of medication refill. If patient is not symptomatic but is completely out of medication, kika as High priority when routing. Inform patient: “Please call back with any questions or concerns and if your condition becomes life threatening, you should seek immediate medical assistance by calling 911 or going to the Emergency Department for evaluation.”    Inform all patients: \"If the clinical team needs to contact you regarding this refill, please be aware the return phone call may come from an unidentified or out of state phone number and your refill request will be addressed as soon as the clinical team reviews your message.\"   Pt unavailable to complete due to being incarcerated.

## 2024-02-09 NOTE — TELEPHONE ENCOUNTER
Patient was discharged from [facility] on 7/6/19 and is at High risk for readmission and recommended to have an HFU non-TCM by 7/13/19 preferred or no later than 7/20/19 or sooner.  Patient was admitted with Uncontrolled diabetes A1c = 11.3, NORMA cardonap Pt has been placed in a body bag at this time. All IV lines, airway, and other medical devices have been removed at this time. Pt is awaiting transfer to the mortuary of the family's choice.     Carmen Harrison RN  02/09/24 2119

## 2024-03-26 NOTE — H&P
DEVENDRA HOSPITALIST  History and Physical     Casey Enrique Patient Status:  Emergency    1970 MRN [id number]   Location [address] [facility] Attending Will, Marisol Ko MD   Hosp Day # 0 PCP Manish Song DO     Chief Complaint Quality 226: Preventive Care And Screening: Tobacco Use: Screening And Cessation Intervention: Patient screened for tobacco use and is an ex/non-smoker Detail Level: Detailed reports that he does not use drugs.     Family History:   Family History   Problem Relation Age of Onset   • Heart Attack Father 50   • Heart Disorder Father    • Thyroid Disorder Mother    • Heart Disorder Mother    • Diabetes Maternal Simona Shell Quality 431: Preventive Care And Screening: Unhealthy Alcohol Use - Screening: Patient not identified as an unhealthy alcohol user when screened for unhealthy alcohol use using a systematic screening method Rfl: 2   folic acid 1 MG Oral Tab Take 1 tablet (1 mg total) by mouth daily. Disp: 30 tablet Rfl: 5   Metoprolol Tartrate 50 MG Oral Tab Take 1 tablet (50 mg total) by mouth 2 (two) times daily.  Disp: 60 tablet Rfl: 3   Insulin Lispro (ADMELOG SOLOSTAR) 10 GFRNAA  82   CA  9.9   ALB  3.8   NA  135*   K  3.1*   CL  92*   CO2  23.0   ALKPHO  316*   AST  246*   ALT  97*   BILT  0.8   TP  8.6*       Estimated Creatinine Clearance: 77 mL/min (based on SCr of 1.07 mg/dL).     Recent Labs   Lab  03/07/19   1311 Quality 130: Documentation Of Current Medications In The Medical Record: Current Medications Documented

## (undated) DEVICE — ENDOSCOPY PACK - LOWER: Brand: MEDLINE INDUSTRIES, INC.

## (undated) DEVICE — Device: Brand: DEFENDO AIR/WATER/SUCTION AND BIOPSY VALVE

## (undated) DEVICE — 1200CC GUARDIAN II: Brand: GUARDIAN

## (undated) DEVICE — FILTERLINE NASAL ADULT O2/CO2

## (undated) DEVICE — FLUIDGARD® 160 ANTI-FOG SURGICAL MASK WITH ANTI-GLARE SHIELD: Brand: PRECEPT ®

## (undated) DEVICE — ENDOSCOPY PACK UPPER: Brand: MEDLINE INDUSTRIES, INC.

## (undated) DEVICE — FORCEP BIOPSY RJ4 LG CAP W/ND

## (undated) DEVICE — 3M™ RED DOT™ MONITORING ELECTRODE WITH FOAM TAPE AND STICKY GEL, 50/BAG, 20/CASE, 72/PLT 2570: Brand: RED DOT™

## (undated) NOTE — ED AVS SNAPSHOT
Ori Flanagan   MRN: [id number]    Department:  [facility] Emergency Department   Date of Visit:  12/30/2017           Disclosure     Insurance plans vary and the physician(s) referred by the ER may not be covered by your plan.  Please contact tell this physician (or your personal doctor if your instructions are to return to your personal doctor) about any new or lasting problems. The primary care or specialist physician will see patients referred from the [facility] Emergency Department.  Severo Pastures

## (undated) NOTE — ED AVS SNAPSHOT
Kal Jeffery   MRN: [id number]    Department:  [facility] Emergency Department   Date of Visit:  6/27/2018           Disclosure     Insurance plans vary and the physician(s) referred by the ER may not be covered by your plan.  Please contact tell this physician (or your personal doctor if your instructions are to return to your personal doctor) about any new or lasting problems. The primary care or specialist physician will see patients referred from the [facility] Emergency Department.  Farshad Fautsin

## (undated) NOTE — LETTER
December 19, 2017        [facility]      Dear Yuri Milligan:    I am the Nurse Care Manager with Dr. Donavan Sher office. Just reaching out to see how you are doing since your discharge home.    When you

## (undated) NOTE — Clinical Note
FYI, HFU call made, see notes. NCM attempted to schedule a HFU non-TCM, patient states he will call right now to schedule a HFU. Message sent to MD's office.

## (undated) NOTE — MR AVS SNAPSHOT
[facility], [address] [place] [id number] [phone]               Thank you for choosing us for your health care visit with Rodolfo Suarez DO.   We are glad to serve you and happy to provide you with this summary Enalapril Maleate 20 MG Tabs   Take 1 tablets, 20mg, daily   Commonly known as:  VASOTEC           HYDROcodone-acetaminophen  MG Tabs   Take 1 tablet by mouth every 8 (eight) hours as needed for Pain (Left shoulder pain).    Commonly known as: [F10.239], Hypomagnesemia [E83.42], Facial twitching [G51.4]           TSH W REFLEX TO FREE T4    Complete by:  Jan 11, 2017 (Approximate)    Assoc Dx:   Seizure ([facility] [R56.9], Medically noncompliant [Z91.19], Uncontrolled hypertension [I10], Alcohol abuse Assoc Dx:   Seizure ([facility] [R56.9], Medically noncompliant [Z91.19], Uncontrolled hypertension [I10], Alcohol abuse [R56.42], Alcoholic cirrhosis of liver without ascites ([facility] [K70.30]           MAGNESIUM [622][Q]    Complete by:  Jan 11, 2017 (Approximate your appointment immediately. However, if you are unsure about the requirements for authorization, please wait 5-7 days and then contact your physician's office.  At that time, you will be provided with any authorization numbers or be assured that none are

## (undated) NOTE — ED AVS SNAPSHOT
April Rust   MRN: [id number]    Department:  [facility] Emergency Department   Date of Visit:  7/29/2019           Disclosure     Insurance plans vary and the physician(s) referred by the ER may not be covered by your plan.  Please contact tell this physician (or your personal doctor if your instructions are to return to your personal doctor) about any new or lasting problems. The primary care or specialist physician will see patients referred from the [facility] Emergency Department.  Wing Zayas

## (undated) NOTE — ED AVS SNAPSHOT
Morris Hernandez   MRN: [id number]    Department:  [facility] Emergency Department   Date of Visit:  7/14/2019           Disclosure     Insurance plans vary and the physician(s) referred by the ER may not be covered by your plan.  Please contact tell this physician (or your personal doctor if your instructions are to return to your personal doctor) about any new or lasting problems. The primary care or specialist physician will see patients referred from the [facility] Emergency Department.  Nancy Hu

## (undated) NOTE — Clinical Note
Pt is coming for hospital follow up today. Pt stated he is still experiencing abdominal pain when he eats and plans to discuss being referred to GI at his apt. Pt had no other concerns at this time.  Please complete med rec at [facility], pt did not review his med

## (undated) NOTE — IP AVS SNAPSHOT
[facility] [place] [address] [facility], [address] ~ [phone]                Discharge Summary   2/24/2017    Sissy Ambrosia           Admission Information        Provider Department    2/24/2017 Delon Adams MD  3ne-A Take 1 tablet by mouth every 4 (four) hours as needed.     Noe Mancera taking these medications        Instructions Authorizing Provider    Morning Afternoon Evening As Needed    AmLODIPine Besylate 10 MG Tabs   Common dependency program. You have the referral numbers. At minimum, go to [place] [address] meetings at least three times a week. 2) Follow up with your primary care physician regarding your high blood pressure.  You were started on a new blood pressure medication, hydroch HgbA1C Glucose BUN Creatinine Calcium Alkaline Phosph AST    -- (03/01/17)  211 (H) (03/01/17)  5 (L) (03/01/17)  0.74 (03/01/17)  8.1 (L) (02/26/17)  80 (02/26/17)  30      Metabolic Lab Results  (Last result in the past 90 days)    ALT Bilirubin,Total T [place] questions? Call [phone] for help. [employer] is NOT to be used for urgent needs.   For medical emergencies, dial 911.             _____________________________________________________________________________    Medication Side Effects - Medica Use: Treat pain, fever, inflammation   Most common side effects: Stomach upset   What to report to your healthcare team: Stomach upset, unresolved pain           General Nerve Function Medications     LEVETIRACETAM 750 MG Oral Tab       Use:  Treat condit

## (undated) NOTE — LETTER
18    Patient: Viry Royal  : 1970 Visit date: 2018    Dear  Dr. Cuca Nunez, DO,    Thank you for referring Viry Royal to my practice. Please find my assessment and plan below.          Assessment   Splenic rupture  (primary All questions were answered. The patient is happy with the results of his surgery. I have no further follow-up scheduled with this patient at this time. This patient can see me on an as-needed basis.  This patient should return urgently for any problems or

## (undated) NOTE — ED AVS SNAPSHOT
Negin Goldman   MRN: [id number]    Department:  [facility] Emergency Department   Date of Visit:  1/6/2018           Disclosure     Insurance plans vary and the physician(s) referred by the ER may not be covered by your plan.  Please contact y tell this physician (or your personal doctor if your instructions are to return to your personal doctor) about any new or lasting problems. The primary care or specialist physician will see patients referred from the [facility] Emergency Department.  Shelton Lombard

## (undated) NOTE — LETTER
1/25/2018    [address]  [address] [id number]    Dear Yamini Johanna Briggs,     [address] [facility] office has been trying to contact you to discuss your recent test results or you are due for an appointment with your provider.   It is important that we

## (undated) NOTE — LETTER
Liya Oscar 182 6 13[facility]  [place], [address] [place] [address]    Consent for Operation  Date: __________________                                Time: _______________    1.  I authorize the performance upon Herberth Cabrera the following operation:  Proced procedure has been videotaped, the surgeon will obtain the original videotape. The hospital will not be responsible for storage or maintenance of this tape.   7. For the purpose of advancing medical education, I consent to the admittance of observers to the STATEMENTS REQUIRING INSERTION OR COMPLETION WERE FILLED IN.     Signature of Patient:   ___________________________    When the patient is a minor or mentally incompetent to give consent:  Signature of person authorized to consent for patient: ____________ supplements, and pills I can buy without a prescription (including street drugs/illegal medications). Failure to inform my anesthesiologist about these medicines may increase my risk of anesthetic complications. iv.  If I am allergic to anything or have ha Anesthesiologist Signature     Date   Time  I have discussed the procedure and information above with the patient (or patient’s representative) and answered their questions. The patient or their representative has agreed to have anesthesia services.     ___

## (undated) NOTE — ED AVS SNAPSHOT
Lolita La   MRN: [id number]    Department:  [facility] Emergency Department   Date of Visit:  1/7/2019           Disclosure     Insurance plans vary and the physician(s) referred by the ER may not be covered by your plan.  Please contact y tell this physician (or your personal doctor if your instructions are to return to your personal doctor) about any new or lasting problems. The primary care or specialist physician will see patients referred from the [facility] Emergency Department.  Everton Goel

## (undated) NOTE — LETTER
Date: 1/17/2019    Patient Name: Randa Carlisle          To Whom it may concern: This letter has been written at the patient's request. The above patient was seen at the [facility] for treatment of a medical condition.     This patient

## (undated) NOTE — LETTER
Liya Oscar 182 6 13[facility]  [place], [address] [facility]    Consent for Operation  Date: __________________                                Time: _______________    1.  I authorize the performance upon Lyle Chaparro the following operation:  Proced procedure has been videotaped, the surgeon will obtain the original videotape. The hospital will not be responsible for storage or maintenance of this tape.   7. For the purpose of advancing medical education, I consent to the admittance of observers to the STATEMENTS REQUIRING INSERTION OR COMPLETION WERE FILLED IN.     Signature of Patient:   ___________________________    When the patient is a minor or mentally incompetent to give consent:  Signature of person authorized to consent for patient: ____________ supplements, and pills I can buy without a prescription (including street drugs/illegal medications). Failure to inform my anesthesiologist about these medicines may increase my risk of anesthetic complications. iv.  If I am allergic to anything or have ha Anesthesiologist Signature     Date   Time  I have discussed the procedure and information above with the patient (or patient’s representative) and answered their questions. The patient or their representative has agreed to have anesthesia services.     ___

## (undated) NOTE — LETTER
Liya Oscar 182 6 13[facility]  [place], [address] [place] [address]    Consent for Operation  Date: __________________                                Time: _______________    1.  I authorize the performance upon Harriet Clarke the following operation:  Proced procedure has been videotaped, the surgeon will obtain the original videotape. The hospital will not be responsible for storage or maintenance of this tape.   7. For the purpose of advancing medical education, I consent to the admittance of observers to the STATEMENTS REQUIRING INSERTION OR COMPLETION WERE FILLED IN.     Signature of Patient:   ___________________________    When the patient is a minor or mentally incompetent to give consent:  Signature of person authorized to consent for patient: ____________ supplements, and pills I can buy without a prescription (including street drugs/illegal medications). Failure to inform my anesthesiologist about these medicines may increase my risk of anesthetic complications. iv.  If I am allergic to anything or have ha Anesthesiologist Signature     Date   Time  I have discussed the procedure and information above with the patient (or patient’s representative) and answered their questions. The patient or their representative has agreed to have anesthesia services.     ___

## (undated) NOTE — ED AVS SNAPSHOT
Alex Keys   MRN: [id number]    Department:  [facility] Emergency Department   Date of Visit:  7/15/2019           Disclosure     Insurance plans vary and the physician(s) referred by the ER may not be covered by your plan.  Please contact tell this physician (or your personal doctor if your instructions are to return to your personal doctor) about any new or lasting problems. The primary care or specialist physician will see patients referred from the [facility] Emergency Department.  Taj Kumar

## (undated) NOTE — LETTER
Liya Oscar 182 6 13[facility]  [place], [address] [facility]    Consent for Operation  Date: __________________                                Time: _______________    1.  I authorize the performance upon Frances Brooks the following operation:  Proced procedure has been videotaped, the surgeon will obtain the original videotape. The hospital will not be responsible for storage or maintenance of this tape.   7. For the purpose of advancing medical education, I consent to the admittance of observers to the STATEMENTS REQUIRING INSERTION OR COMPLETION WERE FILLED IN.     Signature of Patient:   ___________________________    When the patient is a minor or mentally incompetent to give consent:  Signature of person authorized to consent for patient: ____________ supplements, and pills I can buy without a prescription (including street drugs/illegal medications). Failure to inform my anesthesiologist about these medicines may increase my risk of anesthetic complications. iv.  If I am allergic to anything or have ha Anesthesiologist Signature     Date   Time  I have discussed the procedure and information above with the patient (or patient’s representative) and answered their questions. The patient or their representative has agreed to have anesthesia services.     ___

## (undated) NOTE — LETTER
[facility]  Tammy Issasimon [address] [employer], [address] [place] [address]    Consent for Operation    Date: __________________    Time: _______________    1.  I authorize the performance upon Chanda Arce the following operation:    Exploratory laparotomy, intraabd procedure has been videotaped, the surgeon will obtain the original videotape. The hospital will not be responsible for storage or maintenance of this tape.     6. For the purpose of advancing medical education, I consent to the admittance of observers to t STATEMENTS REQUIRING INSERTION OR COMPLETION WERE FILLED IN.     Signature of Patient:   ___________________________    When the patient is a minor or mentally incompetent to give consent:  Signature of person authorized to consent for patient: ____________ supplements, and pills I can buy without a prescription (including street drugs/illegal medications). Failure to inform my anesthesiologist about these medicines may increase my risk of anesthetic complications.   · If I am allergic to anything or have had Anesthesiologist Signature     Date   Time  I have discussed the procedure and information above with the patient (or patient’s representative) and answered their questions. The patient or their representative has agreed to have anesthesia services.     ___

## (undated) NOTE — LETTER
19    Patient: Frances Brooks  : 1970 Visit date: 2019    Dear  Dr. Francisca Tyler, DO,    Thank you for referring Francesshemar Brooks to my practice. Please find my assessment and plan below.              History of Present Illness   The pa which was unchanged from prior CT. The patient also has inflammatory changes around the pancreas and left upper quadrant, there were no focal fluid collections. I acted as a scribe in this encounter.      The physician obtained a history, independently

## (undated) NOTE — LETTER
Liya Oscar 182 [address] [facility], [address] [facility]     PICC LINE INSERTION CONSENT     I agree to have a Peripherally Inserted Central Catheter (PICC) placed in my arm.    1. The PICC insertion procedure, care, maintenance, risks, benefits, and c goals; and potential problems that might occur during recuperation.  They also discussed reasonable alternatives to the PICC, including risks, benefits, and side effects related to the alternatives and risks related to not receiving this procedure      ____

## (undated) NOTE — LETTER
[facility]  Tammy Luis Manuelsimon [address] [facility], [address] [place] [address]    Consent for Operation    Date: __________________    Time: _______________    1.  I authorize the performance upon Randa Carlisle the following operation:    Exploratory laparotomy, intraabd procedure has been videotaped, the surgeon will obtain the original videotape. The hospital will not be responsible for storage or maintenance of this tape.     6. For the purpose of advancing medical education, I consent to the admittance of observers to t STATEMENTS REQUIRING INSERTION OR COMPLETION WERE FILLED IN.     Signature of Patient:   ___________________________    When the patient is a minor or mentally incompetent to give consent:  Signature of person authorized to consent for patient: ____________ supplements, and pills I can buy without a prescription (including street drugs/illegal medications). Failure to inform my anesthesiologist about these medicines may increase my risk of anesthetic complications.   · If I am allergic to anything or have had Anesthesiologist Signature     Date   Time  I have discussed the procedure and information above with the patient (or patient’s representative) and answered their questions. The patient or their representative has agreed to have anesthesia services.     ___